# Patient Record
Sex: MALE | Race: WHITE | NOT HISPANIC OR LATINO | Employment: OTHER
[De-identification: names, ages, dates, MRNs, and addresses within clinical notes are randomized per-mention and may not be internally consistent; named-entity substitution may affect disease eponyms.]

---

## 2018-03-17 ENCOUNTER — HEALTH MAINTENANCE LETTER (OUTPATIENT)
Age: 78
End: 2018-03-17

## 2020-02-08 ENCOUNTER — HEALTH MAINTENANCE LETTER (OUTPATIENT)
Age: 80
End: 2020-02-08

## 2020-11-08 ENCOUNTER — HEALTH MAINTENANCE LETTER (OUTPATIENT)
Age: 80
End: 2020-11-08

## 2021-03-27 ENCOUNTER — HEALTH MAINTENANCE LETTER (OUTPATIENT)
Age: 81
End: 2021-03-27

## 2021-09-11 ENCOUNTER — HEALTH MAINTENANCE LETTER (OUTPATIENT)
Age: 81
End: 2021-09-11

## 2022-02-22 ENCOUNTER — HOSPITAL ENCOUNTER (EMERGENCY)
Facility: CLINIC | Age: 82
Discharge: HOME | End: 2022-02-22
Attending: EMERGENCY MEDICINE | Admitting: EMERGENCY MEDICINE
Payer: COMMERCIAL

## 2022-02-22 VITALS
HEART RATE: 60 BPM | TEMPERATURE: 98.1 F | RESPIRATION RATE: 16 BRPM | HEIGHT: 66 IN | WEIGHT: 190 LBS | OXYGEN SATURATION: 96 % | BODY MASS INDEX: 30.53 KG/M2 | DIASTOLIC BLOOD PRESSURE: 63 MMHG | SYSTOLIC BLOOD PRESSURE: 109 MMHG

## 2022-02-22 DIAGNOSIS — I47.10 SVT (SUPRAVENTRICULAR TACHYCARDIA) (H): ICD-10-CM

## 2022-02-22 PROBLEM — G40.109: Status: ACTIVE | Noted: 2019-02-19

## 2022-02-22 PROBLEM — G20.C PRIMARY PARKINSONISM (H): Status: ACTIVE | Noted: 2018-12-12

## 2022-02-22 PROBLEM — R53.1 WEAKNESS: Status: ACTIVE | Noted: 2022-01-22

## 2022-02-22 PROBLEM — I49.8 OTHER SPECIFIED CARDIAC DYSRHYTHMIAS(427.89): Status: ACTIVE | Noted: 2018-04-24

## 2022-02-22 LAB
ANION GAP SERPL CALCULATED.3IONS-SCNC: 9 MMOL/L (ref 5–18)
ATRIAL RATE - MUSE: 40 BPM
ATRIAL RATE - MUSE: 55 BPM
BUN SERPL-MCNC: 16 MG/DL (ref 8–28)
CALCIUM SERPL-MCNC: 9 MG/DL (ref 8.5–10.5)
CHLORIDE BLD-SCNC: 104 MMOL/L (ref 98–107)
CO2 SERPL-SCNC: 26 MMOL/L (ref 22–31)
CREAT SERPL-MCNC: 1.14 MG/DL (ref 0.7–1.3)
DIASTOLIC BLOOD PRESSURE - MUSE: 75 MMHG
DIASTOLIC BLOOD PRESSURE - MUSE: 76 MMHG
ERYTHROCYTE [DISTWIDTH] IN BLOOD BY AUTOMATED COUNT: 12.9 % (ref 10–15)
GFR SERPL CREATININE-BSD FRML MDRD: 65 ML/MIN/1.73M2
GLUCOSE BLD-MCNC: 113 MG/DL (ref 70–125)
HCT VFR BLD AUTO: 41.6 % (ref 40–53)
HGB BLD-MCNC: 13.6 G/DL (ref 13.3–17.7)
INTERPRETATION ECG - MUSE: NORMAL
INTERPRETATION ECG - MUSE: NORMAL
MAGNESIUM SERPL-MCNC: 2.1 MG/DL (ref 1.8–2.6)
MCH RBC QN AUTO: 31.3 PG (ref 26.5–33)
MCHC RBC AUTO-ENTMCNC: 32.7 G/DL (ref 31.5–36.5)
MCV RBC AUTO: 96 FL (ref 78–100)
P AXIS - MUSE: NORMAL DEGREES
P AXIS - MUSE: NORMAL DEGREES
PLATELET # BLD AUTO: 171 10E3/UL (ref 150–450)
POTASSIUM BLD-SCNC: 4.6 MMOL/L (ref 3.5–5)
PR INTERVAL - MUSE: NORMAL MS
PR INTERVAL - MUSE: NORMAL MS
QRS DURATION - MUSE: 108 MS
QRS DURATION - MUSE: 108 MS
QT - MUSE: 302 MS
QT - MUSE: 318 MS
QTC - MUSE: 451 MS
QTC - MUSE: 453 MS
R AXIS - MUSE: -35 DEGREES
R AXIS - MUSE: -69 DEGREES
RBC # BLD AUTO: 4.35 10E6/UL (ref 4.4–5.9)
SODIUM SERPL-SCNC: 139 MMOL/L (ref 136–145)
SYSTOLIC BLOOD PRESSURE - MUSE: 102 MMHG
SYSTOLIC BLOOD PRESSURE - MUSE: 114 MMHG
T AXIS - MUSE: 115 DEGREES
T AXIS - MUSE: 89 DEGREES
TSH SERPL DL<=0.005 MIU/L-ACNC: 5.26 UIU/ML (ref 0.3–5)
VENTRICULAR RATE- MUSE: 121 BPM
VENTRICULAR RATE- MUSE: 135 BPM
WBC # BLD AUTO: 9.2 10E3/UL (ref 4–11)

## 2022-02-22 PROCEDURE — 250N000011 HC RX IP 250 OP 636: Performed by: EMERGENCY MEDICINE

## 2022-02-22 PROCEDURE — 83735 ASSAY OF MAGNESIUM: CPT | Performed by: EMERGENCY MEDICINE

## 2022-02-22 PROCEDURE — 85027 COMPLETE CBC AUTOMATED: CPT | Performed by: EMERGENCY MEDICINE

## 2022-02-22 PROCEDURE — 84439 ASSAY OF FREE THYROXINE: CPT | Performed by: EMERGENCY MEDICINE

## 2022-02-22 PROCEDURE — 99285 EMERGENCY DEPT VISIT HI MDM: CPT | Mod: 25

## 2022-02-22 PROCEDURE — 96374 THER/PROPH/DIAG INJ IV PUSH: CPT

## 2022-02-22 PROCEDURE — 84443 ASSAY THYROID STIM HORMONE: CPT | Performed by: EMERGENCY MEDICINE

## 2022-02-22 PROCEDURE — 250N000013 HC RX MED GY IP 250 OP 250 PS 637: Performed by: EMERGENCY MEDICINE

## 2022-02-22 PROCEDURE — 93005 ELECTROCARDIOGRAM TRACING: CPT | Performed by: EMERGENCY MEDICINE

## 2022-02-22 PROCEDURE — 36415 COLL VENOUS BLD VENIPUNCTURE: CPT | Performed by: EMERGENCY MEDICINE

## 2022-02-22 PROCEDURE — 80048 BASIC METABOLIC PNL TOTAL CA: CPT | Performed by: EMERGENCY MEDICINE

## 2022-02-22 RX ORDER — MULTIVITAMIN,THERAPEUTIC
1 TABLET ORAL DAILY
COMMUNITY

## 2022-02-22 RX ORDER — DONEPEZIL HYDROCHLORIDE 5 MG/1
5 TABLET, FILM COATED ORAL EVERY MORNING
COMMUNITY
End: 2024-05-08

## 2022-02-22 RX ORDER — ASPIRIN 81 MG/1
81 TABLET ORAL DAILY
COMMUNITY

## 2022-02-22 RX ORDER — ATENOLOL 50 MG/1
50 TABLET ORAL DAILY
Status: ON HOLD | COMMUNITY
End: 2024-05-13

## 2022-02-22 RX ORDER — TROSPIUM CHLORIDE ER 60 MG/1
60 CAPSULE ORAL EVERY MORNING
COMMUNITY
End: 2024-05-08

## 2022-02-22 RX ORDER — ADENOSINE 3 MG/ML
6 INJECTION, SOLUTION INTRAVENOUS ONCE
Status: COMPLETED | OUTPATIENT
Start: 2022-02-22 | End: 2022-02-22

## 2022-02-22 RX ORDER — CARBIDOPA AND LEVODOPA 25; 100 MG/1; MG/1
2 TABLET ORAL 3 TIMES DAILY
COMMUNITY

## 2022-02-22 RX ORDER — TRAZODONE HYDROCHLORIDE 50 MG/1
50 TABLET, FILM COATED ORAL AT BEDTIME
COMMUNITY

## 2022-02-22 RX ORDER — ATENOLOL 25 MG/1
50 TABLET ORAL ONCE
Status: COMPLETED | OUTPATIENT
Start: 2022-02-22 | End: 2022-02-22

## 2022-02-22 RX ORDER — CHLORAL HYDRATE 500 MG
1 CAPSULE ORAL DAILY
COMMUNITY
End: 2024-05-08

## 2022-02-22 RX ADMIN — ATENOLOL 50 MG: 25 TABLET ORAL at 19:05

## 2022-02-22 RX ADMIN — ADENOSINE 6 MG: 3 INJECTION INTRAVENOUS at 21:37

## 2022-02-23 LAB — T4 FREE SERPL-MCNC: 0.74 NG/DL (ref 0.7–1.8)

## 2022-02-23 NOTE — ED PROVIDER NOTES
Emergency Department Encounter     Evaluation Date & Time:   2/22/2022  6:05 PM    CHIEF COMPLAINT:  Tachycardia      Triage Note:Pt from a [facility] presents by EMS with concerns for elevated heart rate today.  Recent decreased dose of Tenormin last week.  Pt denies chest pain.  Pt does C/O some exertional dyspnea.        Impression and Plan     ED COURSE & MEDICAL DECISION MAKING:    Patient EKG on admission to the ED showed SVT.  Due to recent decrease of atenolol, patient was given original dose of 50 mg atenolol.  Slow down heart rate slightly but not to desired degree.  Repeat 8 EKG showed continued SVT but at slower rate.  Patient was then given 6 mg of adenosine and heart rate returned to baseline of mid 50s bpm.  Blood pressure stayed steady in 110s/60s.  Patient sent home on old dose of 50 mg atenolol and advised follow-up with cardiology for medical management within 1 week.    At the conclusion of the encounter I discussed the results of all the tests and the disposition. The questions were answered. The patient or family acknowledged understanding and was agreeable with the care plan.    FINAL IMPRESSION:    ICD-10-CM    1. SVT (supraventricular tachycardia) (H)  I47.1 Rapid Access Clinic Referral       0 minutes of critical care time    Reassessments & Consults  ED Course as of 02/22/22 2146 Tue Feb 22, 2022   1900 Patient is an 81-year-old gentleman with history of Parkinson's disease, nonspecific tachycardia, here with elevated heart rate and lower than normal blood pressure.  Normally symptoms going down but they have been persistent since this morning.  He recently cut his dose of atenolol in half at home due to lower blood pressures.  He is comfortable on exam, heart rate in the 130s.  EKG shows narrow complex regular tachycardia consistent with SVT.  Modified vagal maneuvers were attempted without success.  He has no chest pain currently.  He was given a full dose of atenolol which  "is worked for him in the past   1900 Initial EKG shows SVT with rate of 135.  Left axis deviation, LVH.  No acute ischemic ST or T wave morphology.  No prior EKG for comparison.  Normal QTC, QRS intervals.   2002 Heart rate is 124 currently.  He will be a peak concentration in about an hour.   2136 Repeat EKG shows started junctional rhythm at a rate of 121.  Left axis deviation.  LVH.  Normal QTC and QRS intervals.  When compared to prior EKG earlier this afternoon the junctional rhythm is unchanged, rate has decreased from 1 35-1 21.  Impression: Accelerated junctional rhythm versus SVT with a rate of 121.       MEDICATIONS GIVEN IN THE EMERGENCY DEPARTMENT:  Medications   atenolol (TENORMIN) tablet 50 mg (50 mg Oral Given 2/22/22 1905)   adenosine (ADENOCARD) injection 6 mg (6 mg Intravenous Given 2/22/22 2137)       NEW PRESCRIPTIONS STARTED AT TODAY'S ED VISIT:  New Prescriptions    No medications on file       HPI     HPI     Al Lynn is a 81 year old male with a pertinent history of HTN, episodic SVT, and Parkinson's disease who presents to this ED via EMS for evaluation of tachycardia.     Patient reported feeling \"funny\" and checked pulse at 7am.  Found to be fast.  Provider at nursing home noted that blood pressure was below his normal (110s/60s) and that heart rate was in 130's.  Patient reported some chest pain with exertion and some shortness of breath with activity, but none at rest.  Patient was then taken to ED via EMS.    Per patient, he has had many episodes of this episodic tachycardia.  Per him, most episodes resolved within about 5 to 10 minutes.  Longest episode lasted about 1.5 days.  Patient reported that he recently reduced his dose of atenolol from 50 to 25 mg due to excessively slowed heart rate per report from daughter.    Of note, patient had an episode last night in which left arm went completely numb and he was unable to move it at all.  This episode lasted for 5 minutes and " then resolved completely.  Patient has never had an episode like this before per both patient report and daughter.    REVIEW OF SYSTEMS:  Review of Systems  Positive for chest pain with activity, shortness of breath with activity, loose stool 2 days ago, and resting tremor.    Negative for headache, double vision, chest pain at rest, shortness of breath at rest, unilateral numbness, unilateral weakness, leg swelling, gait imbalance.      Medical History     Past Medical History:   Diagnosis Date     Colon polyp      Cough      Depression      Diverticulitis      Head injury      Hearing loss      Hemorrhoids      HTN (hypertension)      Sinus drainage        Past Surgical History:   Procedure Laterality Date     ESOPHAGOSCOPY, GASTROSCOPY, DUODENOSCOPY (EGD), COMBINED  4/21/2014    Procedure: Gastroscopy ;  Surgeon: Bradley Colmenares MD;  Location: [facility]     HERNIA REPAIR, INGUINAL RT/LT       ZZC APPENDECTOMY         Family History   Problem Relation Age of Onset     C.A.D. Father      Cancer Brother      Neurologic Disorder Brother         parkinsons       Social History     Tobacco Use     Smoking status: Former Smoker     Smokeless tobacco: Never Used   Substance Use Topics     Alcohol use: Yes     Comment: every day     Drug use: No       aspirin 81 MG EC tablet  atenolol (TENORMIN) 50 MG tablet  carbidopa-levodopa (SINEMET)  MG tablet  diphenhydrAMINE (BENADRYL) 50 MG capsule  donepezil (ARICEPT) 5 MG tablet  fish oil-omega-3 fatty acids 1000 MG capsule  LANsoprazole (PREVACID) 30 MG capsule  multivitamin, therapeutic (THERA-VIT) TABS tablet  traZODone (DESYREL) 50 MG tablet  trospium (SANCTURA XR) 60 MG CP24 24 hr capsule  Vitamin D, Cholecalciferol, 25 MCG (1000 UT) TABS  vitamin E (TOCOPHEROL) 200 units (90 mg) capsule        Physical Exam     First Vitals:  Patient Vitals for the past 24 hrs:   BP Temp Temp src Pulse Resp SpO2 Height Weight   02/22/22 2102 114/66 -- -- (!) 121 19 96 % -- --   02/22/22  "1915 113/67 -- -- (!) 132 (!) 31 95 % -- --   02/22/22 1900 99/64 -- -- (!) 132 22 95 % -- --   02/22/22 1845 95/67 -- -- (!) 131 23 96 % -- --   02/22/22 1830 95/66 -- -- (!) 133 17 98 % -- --   02/22/22 1812 102/76 98.1  F (36.7  C) Oral (!) 134 18 98 % 1.676 m (5' 6\") 86.2 kg (190 lb)       PHYSICAL EXAM:   Physical Exam  General: Elderly gentleman who appears stated age, alert and engaged in conversation   Cardiovascular: I/VI systolic flow murmur at aortic listening area, tachycardic, regular rhythm, +3 pulses bilateral upper extremities and lower extremities  Pulmonary: Clear to auscultation, no wheezes, rales, rhonchi  Extremities: No edema, no tenderness to palpation of calves  Neuro: 4/4 strength upper extremities bilaterally, 4/4 strength lower extremities bilaterally    Results     LAB:  All pertinent labs reviewed and interpreted  Labs Ordered and Resulted from Time of ED Arrival to Time of ED Departure   CBC WITH PLATELETS - Abnormal       Result Value    WBC Count 9.2      RBC Count 4.35 (*)     Hemoglobin 13.6      Hematocrit 41.6      MCV 96      MCH 31.3      MCHC 32.7      RDW 12.9      Platelet Count 171     TSH WITH FREE T4 REFLEX - Abnormal    TSH 5.26 (*)    BASIC METABOLIC PANEL - Normal    Sodium 139      Potassium 4.6      Chloride 104      Carbon Dioxide (CO2) 26      Anion Gap 9      Urea Nitrogen 16      Creatinine 1.14      Calcium 9.0      Glucose 113      GFR Estimate 65     MAGNESIUM - Normal    Magnesium 2.1     T4 FREE       RADIOLOGY:  No orders to display              ECG:  SVT  Performed at: 1808    Impression: SVT    Rate: 135  Rhythm: SVT  Axis: normal  AK Interval: unable to determine due to lack of P waves  QRS Interval: 108  QTc Interval: 453  ST Changes: none  Comparison: none    SVT  Performed at: 2056    Impression: SVT, slightly slower rate than before    Rate: 121  Rhythm: SVT  Axis: normal  AK Interval: unable to determine due to lack of p waves  QRS Interval: 108  QTc " Interval: 451  ST Changes: none  Comparison: 2/22/22 1808    I have independently reviewed and interpreted the EKS(s) documented above    PROCEDURES:  Procedures:  none    [facility] Documentation               CMS Diagnoses:           Alysa Gibson MD  Emergency Medicine  [facility] EMERGENCY ROOM       Alysa Gibson MD  Resident  02/22/22 8644

## 2022-02-23 NOTE — DISCHARGE INSTRUCTIONS
Your heart was in a rhythm called SVT.  This is likely the rapid rate you have felt off and on over the years.  Because you decreased her atenolol and because it lasted all day today, I think it is probably safer to go back onto your normal dose of atenolol, 50 mg, for a short period of time.  I placed an urgent referral for cardiology, somebody should call tomorrow and help set up an appointment.  They may be able to see you by the end of the week.  They can make further recommendations on medical management.

## 2022-02-23 NOTE — ED TRIAGE NOTES
Pt from a [facility] presents by EMS with concerns for elevated heart rate today.  Recent decreased dose of Tenormin last week.  Pt denies chest pain.  Pt does C/O some exertional dyspnea.

## 2022-02-23 NOTE — ED PROVIDER NOTES
Emergency Department Midlevel Supervisory Note     I personally saw the patient and performed a substantive portion of the visit including all aspects of the medical decision making.    ED Course:  6:40 PM Dr. Alysa Gibson, resident physician, staffed patient with me. I agree with their assessment and plan of management, and I will see the patient.  6:50 PM I met with the patient to introduce myself, gather additional history, perform my initial exam, and discuss the plan.     Brief HPI:     Al Lynn is a 81 year old male who presents for evaluation of tachycardia.    Patient has history of paroxysmal tachycardia, no diagnosis for the specific type, but takes atenolol.  Dose was dropped this past week and today is felt this tachycardic sensation since he woke up, much longer than it usually lasts.  No current chest pain or shortness of breath.    I, Oxana Gao, am serving as a scribe to document services personally performed by Dr. Choi, based on my observations and the provider's statements to me.   I, Selvin Choi MD, attest that Oxana Gao was acting in a scribe capacity, has observed my performance of the services and has documented them in accordance with my direction.    Brief Physical Exam:  Constitutional:  Alert, in no acute distress  EYES: Conjunctivae clear  HENT:  Atraumatic, normocephalic  Respiratory:  Respirations even, unlabored, in no acute respiratory distress  Cardiovascular:  Regular rate and rhythm, good peripheral perfusion  GI: Soft, nondistended, nontender, no palpable masses, no rebound, no guarding   Musculoskeletal:  No edema. No cyanosis. Range of motion major extremities intact.    Integument: Warm, Dry, No erythema, No rash.   Neurologic:  Alert & oriented, no focal deficits noted  Psych: Normal mood and affect     MDM:    ED Course as of 02/22/22 2149 Tue Feb 22, 2022 1900 Patient is an 81-year-old gentleman with history of Parkinson's disease,  nonspecific tachycardia, here with elevated heart rate and lower than normal blood pressure.  Normally symptoms going down but they have been persistent since this morning.  He recently cut his dose of atenolol in half at home due to lower blood pressures.  He is comfortable on exam, heart rate in the 130s.  EKG shows narrow complex regular tachycardia consistent with SVT.  Modified vagal maneuvers were attempted without success.  He has no chest pain currently.  He was given a full dose of atenolol which is worked for him in the past   1900 Initial EKG shows SVT with rate of 135.  Left axis deviation, LVH.  No acute ischemic ST or T wave morphology.  No prior EKG for comparison.  Normal QTC, QRS intervals.   2002 Heart rate is 124 currently.  He will be a peak concentration in about an hour.   2136 Repeat EKG shows started junctional rhythm at a rate of 121.  Left axis deviation.  LVH.  Normal QTC and QRS intervals.  When compared to prior EKG earlier this afternoon the junctional rhythm is unchanged, rate has decreased from 1 35-1 21.  Impression: Accelerated junctional rhythm versus SVT with a rate of 121.     Patient was successfully cardioverted with adenosine.  6 mg was used.  Repeat EKG was ordered.                     1. SVT (supraventricular tachycardia) (H)        Labs and Imaging:  Results for orders placed or performed during the hospital encounter of 02/22/22   ECG 12-LEAD WITH MUSE (LHE)   Result Value Ref Range    Systolic Blood Pressure 102 mmHg    Diastolic Blood Pressure 76 mmHg    Ventricular Rate 135 BPM    Atrial Rate 55 BPM    NE Interval  ms    QRS Duration 108 ms     ms    QTc 453 ms    P Axis  degrees    R AXIS -69 degrees    T Axis 89 degrees    Interpretation ECG       Supraventricular tachycardia  Left anterior fascicular block  Left ventricular hypertrophy with repolarization abnormality  Abnormal ECG  No previous ECGs available  Confirmed by SEE ED PROVIDER NOTE FOR, ECG  INTERPRETATION ([id number]),  INDRA HERNANDEZ ([id number]) on 2/22/2022 6:30:32 PM       I have reviewed the relevant laboratory and radiology studies    Procedures:  I was present for the key portions of this procedure:   PROCEDURE: Chemical Cardioversion    INDICATIONS: SVT   CARDIOVERSION TYPE: Chemical, Adenosine   CARDIOVERSION PROVIDER: Dr Dhruv Choi   SEDATION: None   CONSENT: Risks, benefits and alternatives were discussed with and Written consent was obtained from Patient.   PROCEDURE SPECIFIC CHECKLIST COMPLETED: Yes   TIME OUT: Universal protocol was followed. TIME OUT conducted just prior to starting procedure confirmed patient identity, site/side, procedure, patient position, and availability of correct equipment. Yes.   MEDICATIONS GIVEN: 6 mg Adenosine, IV push, followed by rapid flush     MONITORING: Heart rate, cardiac monitor, continuous pulse oximeter, frequent blood pressure checks, level of consciousness checks, IV access, constant attendance by RN until patient is recovered and aconstant attendance by MD until patient is stable   RESPONSE: Vital signs stable, airway patent and O2 saturations remained >92%     Brief pause, then return to bradycardic rhythm.  He tolerated the procedure very well.   COMPLICATIONS: Patient tolerated procedure well, without complication         Selvin Choi MD  [facility] EMERGENCY ROOM  [facility] [address] [phone]       Selvin Choi MD  02/22/22 3774

## 2022-02-25 ENCOUNTER — DOCUMENTATION ONLY (OUTPATIENT)
Dept: OTHER | Facility: CLINIC | Age: 82
End: 2022-02-25
Payer: COMMERCIAL

## 2022-03-03 ENCOUNTER — OFFICE VISIT (OUTPATIENT)
Dept: CARDIOLOGY | Facility: CLINIC | Age: 82
End: 2022-03-03
Attending: EMERGENCY MEDICINE
Payer: COMMERCIAL

## 2022-03-03 VITALS
RESPIRATION RATE: 16 BRPM | HEART RATE: 64 BPM | DIASTOLIC BLOOD PRESSURE: 58 MMHG | WEIGHT: 189 LBS | HEIGHT: 63 IN | SYSTOLIC BLOOD PRESSURE: 104 MMHG | BODY MASS INDEX: 33.49 KG/M2

## 2022-03-03 DIAGNOSIS — R55 SYNCOPE, UNSPECIFIED SYNCOPE TYPE: ICD-10-CM

## 2022-03-03 DIAGNOSIS — G20.C PRIMARY PARKINSONISM (H): ICD-10-CM

## 2022-03-03 DIAGNOSIS — I47.10 PAROXYSMAL SUPRAVENTRICULAR TACHYCARDIA (H): Primary | ICD-10-CM

## 2022-03-03 PROCEDURE — 99204 OFFICE O/P NEW MOD 45 MIN: CPT | Performed by: INTERNAL MEDICINE

## 2022-03-03 RX ORDER — OXYBUTYNIN CHLORIDE 10 MG/1
10 TABLET, EXTENDED RELEASE ORAL DAILY
COMMUNITY
End: 2024-05-08

## 2022-03-03 NOTE — PATIENT INSTRUCTIONS
1. Continue current medications  2. Schedule holter monitor and consultation with EP regarding possible ablation therapy

## 2022-03-03 NOTE — LETTER
3/3/2022    Joann Guzman  [address]  [place] [facility] [address]    RE: Al Lynn       Dear Colleague,     I had the pleasure of seeing Al Lynn in the [facility].      Thank you, Dr. Selvin Choi, for asking the [facility] team to see Mr. Al Lynn in the rapid access clinic to evaluate paroxysmal SVT.    Assessment/Recommendations   Assessment:    1.  Paroxysmal SVT, converted with 6 mg IV adenosine back to sinus rhythm.  He had been on atenolol 50 mg daily for many years now both for treatment of hypertension as well as SVT.  This dose had recently been reduced to 25 mg daily due to bradycardia.  Following his SVT episode, dose has been increased back to 50 mg daily.  We will continue this dose for now but did suggest a Holter monitor to evaluate for profound bradycardia.  Also recommended referral to EP regarding feasibility of an ablation procedure to eradicate his SVT.    2. Possible syncope.  Patient reportedly had a fall in the garage of the living facility he resides in.  Does not recall feeling lightheaded prior to the event or sense of rapid heart beating.  Remembers being on the ground but unable to move his arms and legs but could raise his head.  Ultimately found but did not undergo immediate medical evaluation.  3.  Bradycardia with heart rates documented into the 40s at his assisted living facility.  For this reason he was recently advised to cut his atenolol to 25 mg daily.  Again this was recently increased following his episode of SVT.  4.  Parkinson's disease    Plan:  1.  Continue atenolol at 50 mg daily  2.  Holter monitor  3.  Referral to EP for possible ablation       History of Present Illness    Mr. Al Lynn is a 81 year old male with history of tachycardia dating back 30 years now for which he has been on atenolol who recently was seen in the ED following an episode of more persistent tachycardia.   "Patient states that over the years, his episodes have lasted 5 to 10 minutes and have resolved.  He has not had any prolonged episodes.  About a week before his event, his dose of atenolol was decreased due to concerns that his heart rates were down into the 40s.  He was having no symptoms at that time; however, he did have a possible syncopal episode 2 months prior and there was concern it might have been related to a low heart rate.  On the date of his presentation, he is noted sense of rapid heart beating which persisted throughout the day.  He was ultimately brought to the ED for evaluation where he was noted to be in a narrow complex tachycardia at a rate of 135.  Initial attempts at vagal maneuvers were unsuccessful.  He was given 6 mg of IV adenosine with spontaneous conversion to sinus rhythm.  He was subsequently discharged back on 50 mg of atenolol daily.    ECG (personally reviewed): ECGs x2 again demonstrate narrow complex tachycardia    Cardiac Imaging Studies (personally reviewed): Echocardiogram done in October 2021 demonstrated normal left ventricular systolic function with an ejection fraction of 61%, trace aortic, mitral and tricuspid insufficiency.  Pulmonary artery pressures at upper limits of normal.       Physical Examination Review of Systems   /58 (BP Location: Right arm, Patient Position: Sitting, Cuff Size: Adult Regular)   Pulse 64   Resp 16   Ht 1.6 m (5' 3\")   Wt 85.7 kg (189 lb)   BMI 33.48 kg/m    Body mass index is 33.48 kg/m .  Wt Readings from Last 3 Encounters:   03/03/22 85.7 kg (189 lb)   02/22/22 86.2 kg (190 lb)   04/21/14 92.1 kg (203 lb)     General Appearance:   Awake, Alert, No acute distress.   HEENT:  No scleral icterus; the mucous membranes were pink and moist.   Neck: No cervical bruits or jugular venous distention    Chest: The spine was straight. The chest was symmetric.   Lungs:   Respirations unlabored; the lungs are clear to auscultation. No wheezing " "  Cardiovascular:    Regular rate and rhythm.  S1, S2 normal.  1/6 systolic murmur noted   Abdomen:  No organomegaly, masses, bruits, or tenderness. Bowels sounds are present   Extremities:  No peripheral edema, clubbing or cyanosis   Skin: No xanthelasma. Warm, Dry.   Musculoskeletal: No tenderness.   Neurologic: Mood and affect are appropriate.    Enc Vitals  BP: 104/58  Pulse: 64  Resp: 16  Weight: 85.7 kg (189 lb)  Height: 160 cm (5' 3\")                                         Medical History  Surgical History Family History Social History   Past Medical History:   Diagnosis Date     Colon polyp      Cough      Depression      Diverticulitis      Head injury     fell working in garden     Hearing loss      Hemorrhoids      HTN (hypertension)      Sinus drainage     coughing spells?    Past Surgical History:   Procedure Laterality Date     ESOPHAGOSCOPY, GASTROSCOPY, DUODENOSCOPY (EGD), COMBINED  4/21/2014    Procedure: Gastroscopy ;  Surgeon: Bradley Colmenares MD;  Location: [facility]     HERNIA REPAIR, INGUINAL RT/LT       ZZC APPENDECTOMY      Family History   Problem Relation Age of Onset     C.A.D. Father      Cancer Brother      Neurologic Disorder Brother         parkinsons    Social History     Socioeconomic History     Marital status:      Spouse name: Not on file     Number of children: Not on file     Years of education: Not on file     Highest education level: Not on file   Occupational History     Not on file   Tobacco Use     Smoking status: Former Smoker     Smokeless tobacco: Never Used   Substance and Sexual Activity     Alcohol use: Yes     Comment: every day     Drug use: No     Sexual activity: Not on file   Other Topics Concern     Parent/sibling w/ CABG, MI or angioplasty before 65F 55M? Not Asked   Social History Narrative     Not on file     Social Determinants of Health     Financial Resource Strain: Not on file   Food Insecurity: Not on file   Transportation Needs: Not on file   Physical " Activity: Not on file   Stress: Not on file   Social Connections: Not on file   Intimate Partner Violence: Not on file   Housing Stability: Not on file          Medications  Allergies   Current Outpatient Medications   Medication Sig Dispense Refill     aspirin 81 MG EC tablet Take 81 mg by mouth daily       atenolol (TENORMIN) 50 MG tablet Take 25 mg by mouth daily Take 1/2 tablet (25 mg) daily.       carbidopa-levodopa (SINEMET)  MG tablet Take 2 tablets by mouth 3 times daily 7 am, 1 pm, and 7pm.       multivitamin, therapeutic (THERA-VIT) TABS tablet Take 1 tablet by mouth daily       oxybutynin ER (DITROPAN-XL) 10 MG 24 hr tablet Take 10 mg by mouth daily       vitamin E (TOCOPHEROL) 200 units (90 mg) capsule Take 200 Units by mouth daily       diphenhydrAMINE (BENADRYL) 50 MG capsule Take 1 capsule by mouth At Bedtime. (Patient not taking: Reported on 3/3/2022) 60 capsule 2     donepezil (ARICEPT) 5 MG tablet Take 5 mg by mouth every morning (Patient not taking: Reported on 3/3/2022)       fish oil-omega-3 fatty acids 1000 MG capsule Take 1 g by mouth daily (Patient not taking: Reported on 3/3/2022)       LANsoprazole (PREVACID) 30 MG capsule Take 1 capsule (30 mg) by mouth daily Take 30-60 minutes before a meal. (Patient not taking: Reported on 3/3/2022) 60 capsule 1     traZODone (DESYREL) 50 MG tablet Take 50 mg by mouth At Bedtime (Patient not taking: Reported on 3/3/2022)       trospium (SANCTURA XR) 60 MG CP24 24 hr capsule Take 60 mg by mouth every morning (Patient not taking: Reported on 3/3/2022)       Vitamin D, Cholecalciferol, 25 MCG (1000 UT) TABS Take 2,000 Units by mouth daily (Patient not taking: Reported on 3/3/2022)        Allergies   Allergen Reactions     Nka [No Known Allergies]          Lab Results    Chemistry/lipid CBC Cardiac Enzymes/BNP/TSH/INR   Recent Labs   Lab Test 02/22/22 1931   BUN 16      CO2 26    Recent Labs   Lab Test 02/22/22 1931   WBC 9.2   HGB 13.6   HCT  41.6   MCV 96       Recent Labs   Lab Test 02/22/22  1931   TSH 5.26*        A total of 62 minutes was spent reviewing patient's medical records, obtaining history and performing examination, as well as discussing diagnoses/ recommendations with patient and answering all questions.                  Thank you for allowing me to participate in the care of your patient.      Sincerely,     Lorelei Main MD     [facility]  cc:   Selvin Choi MD  [facility]  [facility] [phone]

## 2022-03-03 NOTE — PROGRESS NOTES
Thank you, Dr. Selvin Choi, for asking the [facility] team to see Mr. Al Lynn in the rapid access clinic to evaluate paroxysmal SVT.    Assessment/Recommendations   Assessment:    1.  Paroxysmal SVT, converted with 6 mg IV adenosine back to sinus rhythm.  He had been on atenolol 50 mg daily for many years now both for treatment of hypertension as well as SVT.  This dose had recently been reduced to 25 mg daily due to bradycardia.  Following his SVT episode, dose has been increased back to 50 mg daily.  We will continue this dose for now but did suggest a Holter monitor to evaluate for profound bradycardia.  Also recommended referral to EP regarding feasibility of an ablation procedure to eradicate his SVT.    2. Possible syncope.  Patient reportedly had a fall in the garage of the living facility he resides in.  Does not recall feeling lightheaded prior to the event or sense of rapid heart beating.  Remembers being on the ground but unable to move his arms and legs but could raise his head.  Ultimately found but did not undergo immediate medical evaluation.  3.  Bradycardia with heart rates documented into the 40s at his assisted living facility.  For this reason he was recently advised to cut his atenolol to 25 mg daily.  Again this was recently increased following his episode of SVT.  4.  Parkinson's disease    Plan:  1.  Continue atenolol at 50 mg daily  2.  Holter monitor  3.  Referral to EP for possible ablation       History of Present Illness    Mr. Al Lynn is a 81 year old male with history of tachycardia dating back 30 years now for which he has been on atenolol who recently was seen in the ED following an episode of more persistent tachycardia.  Patient states that over the years, his episodes have lasted 5 to 10 minutes and have resolved.  He has not had any prolonged episodes.  About a week before his event, his dose of atenolol was decreased due to  "concerns that his heart rates were down into the 40s.  He was having no symptoms at that time; however, he did have a possible syncopal episode 2 months prior and there was concern it might have been related to a low heart rate.  On the date of his presentation, he is noted sense of rapid heart beating which persisted throughout the day.  He was ultimately brought to the ED for evaluation where he was noted to be in a narrow complex tachycardia at a rate of 135.  Initial attempts at vagal maneuvers were unsuccessful.  He was given 6 mg of IV adenosine with spontaneous conversion to sinus rhythm.  He was subsequently discharged back on 50 mg of atenolol daily.    ECG (personally reviewed): ECGs x2 again demonstrate narrow complex tachycardia    Cardiac Imaging Studies (personally reviewed): Echocardiogram done in October 2021 demonstrated normal left ventricular systolic function with an ejection fraction of 61%, trace aortic, mitral and tricuspid insufficiency.  Pulmonary artery pressures at upper limits of normal.       Physical Examination Review of Systems   /58 (BP Location: Right arm, Patient Position: Sitting, Cuff Size: Adult Regular)   Pulse 64   Resp 16   Ht 1.6 m (5' 3\")   Wt 85.7 kg (189 lb)   BMI 33.48 kg/m    Body mass index is 33.48 kg/m .  Wt Readings from Last 3 Encounters:   03/03/22 85.7 kg (189 lb)   02/22/22 86.2 kg (190 lb)   04/21/14 92.1 kg (203 lb)     General Appearance:   Awake, Alert, No acute distress.   HEENT:  No scleral icterus; the mucous membranes were pink and moist.   Neck: No cervical bruits or jugular venous distention    Chest: The spine was straight. The chest was symmetric.   Lungs:   Respirations unlabored; the lungs are clear to auscultation. No wheezing   Cardiovascular:    Regular rate and rhythm.  S1, S2 normal.  1/6 systolic murmur noted   Abdomen:  No organomegaly, masses, bruits, or tenderness. Bowels sounds are present   Extremities:  No peripheral edema, " "clubbing or cyanosis   Skin: No xanthelasma. Warm, Dry.   Musculoskeletal: No tenderness.   Neurologic: Mood and affect are appropriate.    Enc Vitals  BP: 104/58  Pulse: 64  Resp: 16  Weight: 85.7 kg (189 lb)  Height: 160 cm (5' 3\")                                         Medical History  Surgical History Family History Social History   Past Medical History:   Diagnosis Date     Colon polyp      Cough      Depression      Diverticulitis      Head injury     fell working in garden     Hearing loss      Hemorrhoids      HTN (hypertension)      Sinus drainage     coughing spells?    Past Surgical History:   Procedure Laterality Date     ESOPHAGOSCOPY, GASTROSCOPY, DUODENOSCOPY (EGD), COMBINED  4/21/2014    Procedure: Gastroscopy ;  Surgeon: Bradley Colmenares MD;  Location: [facility]     HERNIA REPAIR, INGUINAL RT/LT       ZZC APPENDECTOMY      Family History   Problem Relation Age of Onset     C.A.D. Father      Cancer Brother      Neurologic Disorder Brother         parkinsons    Social History     Socioeconomic History     Marital status:      Spouse name: Not on file     Number of children: Not on file     Years of education: Not on file     Highest education level: Not on file   Occupational History     Not on file   Tobacco Use     Smoking status: Former Smoker     Smokeless tobacco: Never Used   Substance and Sexual Activity     Alcohol use: Yes     Comment: every day     Drug use: No     Sexual activity: Not on file   Other Topics Concern     Parent/sibling w/ CABG, MI or angioplasty before 65F 55M? Not Asked   Social History Narrative     Not on file     Social Determinants of Health     Financial Resource Strain: Not on file   Food Insecurity: Not on file   Transportation Needs: Not on file   Physical Activity: Not on file   Stress: Not on file   Social Connections: Not on file   Intimate Partner Violence: Not on file   Housing Stability: Not on file          Medications  Allergies   Current Outpatient " Medications   Medication Sig Dispense Refill     aspirin 81 MG EC tablet Take 81 mg by mouth daily       atenolol (TENORMIN) 50 MG tablet Take 25 mg by mouth daily Take 1/2 tablet (25 mg) daily.       carbidopa-levodopa (SINEMET)  MG tablet Take 2 tablets by mouth 3 times daily 7 am, 1 pm, and 7pm.       multivitamin, therapeutic (THERA-VIT) TABS tablet Take 1 tablet by mouth daily       oxybutynin ER (DITROPAN-XL) 10 MG 24 hr tablet Take 10 mg by mouth daily       vitamin E (TOCOPHEROL) 200 units (90 mg) capsule Take 200 Units by mouth daily       diphenhydrAMINE (BENADRYL) 50 MG capsule Take 1 capsule by mouth At Bedtime. (Patient not taking: Reported on 3/3/2022) 60 capsule 2     donepezil (ARICEPT) 5 MG tablet Take 5 mg by mouth every morning (Patient not taking: Reported on 3/3/2022)       fish oil-omega-3 fatty acids 1000 MG capsule Take 1 g by mouth daily (Patient not taking: Reported on 3/3/2022)       LANsoprazole (PREVACID) 30 MG capsule Take 1 capsule (30 mg) by mouth daily Take 30-60 minutes before a meal. (Patient not taking: Reported on 3/3/2022) 60 capsule 1     traZODone (DESYREL) 50 MG tablet Take 50 mg by mouth At Bedtime (Patient not taking: Reported on 3/3/2022)       trospium (SANCTURA XR) 60 MG CP24 24 hr capsule Take 60 mg by mouth every morning (Patient not taking: Reported on 3/3/2022)       Vitamin D, Cholecalciferol, 25 MCG (1000 UT) TABS Take 2,000 Units by mouth daily (Patient not taking: Reported on 3/3/2022)        Allergies   Allergen Reactions     Nka [No Known Allergies]          Lab Results    Chemistry/lipid CBC Cardiac Enzymes/BNP/TSH/INR   Recent Labs   Lab Test 02/22/22 1931   BUN 16      CO2 26    Recent Labs   Lab Test 02/22/22 1931   WBC 9.2   HGB 13.6   HCT 41.6   MCV 96       Recent Labs   Lab Test 02/22/22 1931   TSH 5.26*        A total of 62 minutes was spent reviewing patient's medical records, obtaining history and performing examination, as well  as discussing diagnoses/ recommendations with patient and answering all questions.

## 2022-03-08 ENCOUNTER — HOSPITAL ENCOUNTER (OUTPATIENT)
Dept: CARDIOLOGY | Facility: CLINIC | Age: 82
Discharge: HOME | End: 2022-03-08
Attending: INTERNAL MEDICINE | Admitting: INTERNAL MEDICINE
Payer: COMMERCIAL

## 2022-03-08 DIAGNOSIS — I47.10 PAROXYSMAL SUPRAVENTRICULAR TACHYCARDIA (H): ICD-10-CM

## 2022-03-08 PROCEDURE — 93226 XTRNL ECG REC<48 HR SCAN A/R: CPT

## 2022-03-14 PROCEDURE — 93227 XTRNL ECG REC<48 HR R&I: CPT | Performed by: INTERNAL MEDICINE

## 2022-03-18 ENCOUNTER — LAB REQUISITION (OUTPATIENT)
Dept: LAB | Facility: CLINIC | Age: 82
End: 2022-03-18
Payer: COMMERCIAL

## 2022-03-18 DIAGNOSIS — R19.7 DIARRHEA, UNSPECIFIED: ICD-10-CM

## 2022-03-18 PROCEDURE — 87493 C DIFF AMPLIFIED PROBE: CPT | Mod: ORL | Performed by: NURSE PRACTITIONER

## 2022-03-19 LAB — C DIFF TOX B STL QL: NEGATIVE

## 2022-04-23 ENCOUNTER — HEALTH MAINTENANCE LETTER (OUTPATIENT)
Age: 82
End: 2022-04-23

## 2022-06-15 ENCOUNTER — APPOINTMENT (OUTPATIENT)
Dept: RADIOLOGY | Facility: CLINIC | Age: 82
End: 2022-06-15
Attending: EMERGENCY MEDICINE
Payer: COMMERCIAL

## 2022-06-15 ENCOUNTER — HOSPITAL ENCOUNTER (EMERGENCY)
Facility: CLINIC | Age: 82
Discharge: HOME | End: 2022-06-15
Attending: EMERGENCY MEDICINE | Admitting: EMERGENCY MEDICINE
Payer: COMMERCIAL

## 2022-06-15 VITALS
HEIGHT: 69 IN | TEMPERATURE: 98.4 F | HEART RATE: 60 BPM | SYSTOLIC BLOOD PRESSURE: 113 MMHG | RESPIRATION RATE: 16 BRPM | OXYGEN SATURATION: 97 % | WEIGHT: 189 LBS | BODY MASS INDEX: 27.99 KG/M2 | DIASTOLIC BLOOD PRESSURE: 57 MMHG

## 2022-06-15 DIAGNOSIS — I47.10 SVT (SUPRAVENTRICULAR TACHYCARDIA) (H): ICD-10-CM

## 2022-06-15 DIAGNOSIS — Z86.69 HISTORY OF PARKINSON'S DISEASE: ICD-10-CM

## 2022-06-15 DIAGNOSIS — R07.9 CHEST PAIN, UNSPECIFIED TYPE: ICD-10-CM

## 2022-06-15 LAB
ALBUMIN UR-MCNC: NEGATIVE MG/DL
ANION GAP SERPL CALCULATED.3IONS-SCNC: 8 MMOL/L (ref 5–18)
APPEARANCE UR: CLEAR
ATRIAL RATE - MUSE: 71 BPM
ATRIAL RATE - MUSE: 89 BPM
BASOPHILS # BLD AUTO: 0 10E3/UL (ref 0–0.2)
BASOPHILS NFR BLD AUTO: 0 %
BILIRUB UR QL STRIP: NEGATIVE
BNP SERPL-MCNC: 61 PG/ML (ref 0–88)
BUN SERPL-MCNC: 14 MG/DL (ref 8–28)
CALCIUM SERPL-MCNC: 9 MG/DL (ref 8.5–10.5)
CHLORIDE BLD-SCNC: 103 MMOL/L (ref 98–107)
CO2 SERPL-SCNC: 28 MMOL/L (ref 22–31)
COLOR UR AUTO: COLORLESS
CREAT SERPL-MCNC: 1.12 MG/DL (ref 0.7–1.3)
DIASTOLIC BLOOD PRESSURE - MUSE: 61 MMHG
DIASTOLIC BLOOD PRESSURE - MUSE: 83 MMHG
EOSINOPHIL # BLD AUTO: 0.1 10E3/UL (ref 0–0.7)
EOSINOPHIL NFR BLD AUTO: 2 %
ERYTHROCYTE [DISTWIDTH] IN BLOOD BY AUTOMATED COUNT: 13.3 % (ref 10–15)
GFR SERPL CREATININE-BSD FRML MDRD: 66 ML/MIN/1.73M2
GLUCOSE BLD-MCNC: 112 MG/DL (ref 70–125)
GLUCOSE UR STRIP-MCNC: NEGATIVE MG/DL
HCT VFR BLD AUTO: 43.2 % (ref 40–53)
HGB BLD-MCNC: 14.1 G/DL (ref 13.3–17.7)
HGB UR QL STRIP: NEGATIVE
HOLD SPECIMEN: NORMAL
IMM GRANULOCYTES # BLD: 0 10E3/UL
IMM GRANULOCYTES NFR BLD: 0 %
INTERPRETATION ECG - MUSE: NORMAL
INTERPRETATION ECG - MUSE: NORMAL
KETONES UR STRIP-MCNC: NEGATIVE MG/DL
LACTATE SERPL-SCNC: 1 MMOL/L (ref 0.7–2)
LEUKOCYTE ESTERASE UR QL STRIP: NEGATIVE
LYMPHOCYTES # BLD AUTO: 2.1 10E3/UL (ref 0.8–5.3)
LYMPHOCYTES NFR BLD AUTO: 27 %
MAGNESIUM SERPL-MCNC: 2.2 MG/DL (ref 1.8–2.6)
MCH RBC QN AUTO: 31.1 PG (ref 26.5–33)
MCHC RBC AUTO-ENTMCNC: 32.6 G/DL (ref 31.5–36.5)
MCV RBC AUTO: 95 FL (ref 78–100)
MONOCYTES # BLD AUTO: 0.7 10E3/UL (ref 0–1.3)
MONOCYTES NFR BLD AUTO: 9 %
MUCOUS THREADS #/AREA URNS LPF: PRESENT /LPF
NEUTROPHILS # BLD AUTO: 4.7 10E3/UL (ref 1.6–8.3)
NEUTROPHILS NFR BLD AUTO: 62 %
NITRATE UR QL: NEGATIVE
NRBC # BLD AUTO: 0 10E3/UL
NRBC BLD AUTO-RTO: 0 /100
P AXIS - MUSE: 30 DEGREES
P AXIS - MUSE: 43 DEGREES
PH UR STRIP: 6.5 [PH] (ref 5–7)
PLATELET # BLD AUTO: 188 10E3/UL (ref 150–450)
POTASSIUM BLD-SCNC: 4.7 MMOL/L (ref 3.5–5)
PR INTERVAL - MUSE: 194 MS
PR INTERVAL - MUSE: 196 MS
QRS DURATION - MUSE: 116 MS
QRS DURATION - MUSE: 124 MS
QT - MUSE: 348 MS
QT - MUSE: 390 MS
QTC - MUSE: 423 MS
QTC - MUSE: 423 MS
R AXIS - MUSE: -58 DEGREES
R AXIS - MUSE: -63 DEGREES
RBC # BLD AUTO: 4.54 10E6/UL (ref 4.4–5.9)
RBC URINE: 0 /HPF
SODIUM SERPL-SCNC: 139 MMOL/L (ref 136–145)
SP GR UR STRIP: 1 (ref 1–1.03)
SYSTOLIC BLOOD PRESSURE - MUSE: 120 MMHG
SYSTOLIC BLOOD PRESSURE - MUSE: 98 MMHG
T AXIS - MUSE: 35 DEGREES
T AXIS - MUSE: 7 DEGREES
TROPONIN I SERPL-MCNC: <0.01 NG/ML (ref 0–0.29)
TROPONIN I SERPL-MCNC: <0.01 NG/ML (ref 0–0.29)
UROBILINOGEN UR STRIP-MCNC: <2 MG/DL
VENTRICULAR RATE- MUSE: 71 BPM
VENTRICULAR RATE- MUSE: 89 BPM
WBC # BLD AUTO: 7.7 10E3/UL (ref 4–11)
WBC URINE: <1 /HPF

## 2022-06-15 PROCEDURE — 83880 ASSAY OF NATRIURETIC PEPTIDE: CPT | Performed by: EMERGENCY MEDICINE

## 2022-06-15 PROCEDURE — 250N000013 HC RX MED GY IP 250 OP 250 PS 637: Performed by: EMERGENCY MEDICINE

## 2022-06-15 PROCEDURE — 84484 ASSAY OF TROPONIN QUANT: CPT | Mod: 91 | Performed by: EMERGENCY MEDICINE

## 2022-06-15 PROCEDURE — 83605 ASSAY OF LACTIC ACID: CPT | Performed by: EMERGENCY MEDICINE

## 2022-06-15 PROCEDURE — 92960 CARDIOVERSION ELECTRIC EXT: CPT

## 2022-06-15 PROCEDURE — 96361 HYDRATE IV INFUSION ADD-ON: CPT

## 2022-06-15 PROCEDURE — 96374 THER/PROPH/DIAG INJ IV PUSH: CPT | Mod: 59

## 2022-06-15 PROCEDURE — 71045 X-RAY EXAM CHEST 1 VIEW: CPT

## 2022-06-15 PROCEDURE — 36415 COLL VENOUS BLD VENIPUNCTURE: CPT | Performed by: EMERGENCY MEDICINE

## 2022-06-15 PROCEDURE — 83735 ASSAY OF MAGNESIUM: CPT | Performed by: EMERGENCY MEDICINE

## 2022-06-15 PROCEDURE — 81001 URINALYSIS AUTO W/SCOPE: CPT | Performed by: EMERGENCY MEDICINE

## 2022-06-15 PROCEDURE — 93005 ELECTROCARDIOGRAM TRACING: CPT | Performed by: EMERGENCY MEDICINE

## 2022-06-15 PROCEDURE — 80048 BASIC METABOLIC PNL TOTAL CA: CPT | Performed by: EMERGENCY MEDICINE

## 2022-06-15 PROCEDURE — 85025 COMPLETE CBC W/AUTO DIFF WBC: CPT | Performed by: EMERGENCY MEDICINE

## 2022-06-15 PROCEDURE — 258N000003 HC RX IP 258 OP 636: Performed by: EMERGENCY MEDICINE

## 2022-06-15 PROCEDURE — 250N000011 HC RX IP 250 OP 636: Performed by: EMERGENCY MEDICINE

## 2022-06-15 PROCEDURE — 99285 EMERGENCY DEPT VISIT HI MDM: CPT | Mod: 25

## 2022-06-15 RX ORDER — ADENOSINE 3 MG/ML
6 INJECTION, SOLUTION INTRAVENOUS ONCE
Status: COMPLETED | OUTPATIENT
Start: 2022-06-15 | End: 2022-06-15

## 2022-06-15 RX ORDER — CARBIDOPA AND LEVODOPA 25; 100 MG/1; MG/1
2 TABLET ORAL ONCE
Status: COMPLETED | OUTPATIENT
Start: 2022-06-15 | End: 2022-06-15

## 2022-06-15 RX ADMIN — CARBIDOPA AND LEVODOPA 2 TABLET: 25; 100 TABLET ORAL at 14:13

## 2022-06-15 RX ADMIN — ADENOSINE 6 MG: 3 INJECTION, SOLUTION INTRAVENOUS at 12:32

## 2022-06-15 RX ADMIN — SODIUM CHLORIDE 500 ML: 9 INJECTION, SOLUTION INTRAVENOUS at 12:17

## 2022-06-15 ASSESSMENT — ENCOUNTER SYMPTOMS
DIARRHEA: 0
EYES NEGATIVE: 1
CHEST TIGHTNESS: 0
SINUS PRESSURE: 0
NUMBNESS: 0
PALPITATIONS: 0
HEADACHES: 0
FREQUENCY: 1
VOMITING: 0
BLOOD IN STOOL: 0
DYSURIA: 0
TROUBLE SWALLOWING: 0
SHORTNESS OF BREATH: 0
NAUSEA: 0
SORE THROAT: 0
CHILLS: 0
FEVER: 0

## 2022-06-15 NOTE — ED NOTES
EMERGENCY DEPARTMENT SIGN OUT NOTE        ED COURSE AND MEDICAL DECISION MAKING  Patient was signed out to me by Dr Simran Martinez at 2:15 PM.    In brief, Al Lynn is a 81 year old male who initially presented chest pain and heart racing sensation.EKG showed SVT and patient was successfully cardioverted with adenosine.     At time of sign out, disposition was pending delta troponin at 2:51 PM.    3:38 PM I rechecked and updated the patient with results and plan for discharge.     MDM: 82 y/o M with h/o of HTN, parkinson's, among others who presented with SVT and converted with adenosine. Prior provider talked to cardiology and plan for follow up with EP. Was signed out to me pending delta troponin. This was <0.01. I met with patient he has no chest pain or other symptoms, vitals are reassuring and he and daughter feel comfortable with discharge home. Strict return precautions discussed and they are in agreement with plan and their questions were answered.        FINAL IMPRESSION    1. SVT (supraventricular tachycardia) (H)    2. Chest pain, unspecified type    3. History of Parkinson's disease        ED MEDS  Medications   0.9% sodium chloride BOLUS (0 mLs Intravenous Stopped 6/15/22 1405)   adenosine (ADENOCARD) injection 6 mg (6 mg Intravenous Given 6/15/22 1232)   carbidopa-levodopa (SINEMET)  MG per tablet 2 tablet (2 tablets Oral Given 6/15/22 1413)       LAB  Labs Ordered and Resulted from Time of ED Arrival to Time of ED Departure   ROUTINE UA WITH MICROSCOPIC REFLEX TO CULTURE - Abnormal       Result Value    Color Urine Colorless      Appearance Urine Clear      Glucose Urine Negative      Bilirubin Urine Negative      Ketones Urine Negative      Specific Gravity Urine 1.005      Blood Urine Negative      pH Urine 6.5      Protein Albumin Urine Negative      Urobilinogen Urine <2.0      Nitrite Urine Negative      Leukocyte Esterase Urine Negative      Mucus Urine Present (*)     RBC Urine 0       WBC Urine <1     BASIC METABOLIC PANEL - Normal    Sodium 139      Potassium 4.7      Chloride 103      Carbon Dioxide (CO2) 28      Anion Gap 8      Urea Nitrogen 14      Creatinine 1.12      Calcium 9.0      Glucose 112      GFR Estimate 66     TROPONIN I - Normal    Troponin I <0.01     MAGNESIUM - Normal    Magnesium 2.2     B-TYPE NATRIURETIC PEPTIDE ([facility] ONLY) - Normal    BNP 61     LACTIC ACID WHOLE BLOOD - Normal    Lactic Acid 1.0     TROPONIN I - Normal    Troponin I <0.01     CBC WITH PLATELETS AND DIFFERENTIAL    WBC Count 7.7      RBC Count 4.54      Hemoglobin 14.1      Hematocrit 43.2      MCV 95      MCH 31.1      MCHC 32.6      RDW 13.3      Platelet Count 188      % Neutrophils 62      % Lymphocytes 27      % Monocytes 9      % Eosinophils 2      % Basophils 0      % Immature Granulocytes 0      NRBCs per 100 WBC 0      Absolute Neutrophils 4.7      Absolute Lymphocytes 2.1      Absolute Monocytes 0.7      Absolute Eosinophils 0.1      Absolute Basophils 0.0      Absolute Immature Granulocytes 0.0      Absolute NRBCs 0.0         RADIOLOGY    XR Chest Port 1 View   Final Result   IMPRESSION: Negative chest.          DISCHARGE MEDS  New Prescriptions    No medications on file     Katrina Hughes MD  [facility] EMERGENCY ROOM  [address] [address] [phone]     Katrina Hughes MD  06/15/22 0527

## 2022-06-15 NOTE — ED NOTES
Patient using urinal x2 with staff assist, he states he has had to go more frequently.  Attempted vagal  maneuvers with Dr. Martinez without effect.

## 2022-06-15 NOTE — ED PROVIDER NOTES
EMERGENCY DEPARTMENT ENCOUNTER      NAME: Al Lynn  AGE: 81 year old male  YOB: 1940  MRN: [id number]  EVALUATION DATE & TIME: 6/15/2022 11:37 AM    PCP: Joann Guzman    ED PROVIDER: Simran Martinez M.D.      CHIEF COMPLAINT     Chief Complaint   Patient presents with     Chest Pain     Hypotension     Tachycardia         FINAL IMPRESSION:     1. SVT (supraventricular tachycardia) (H)    2. Chest pain, unspecified type    3. History of Parkinson's disease          MEDICAL DECISION MAKING:       Pertinent Labs & Imaging studies reviewed. (See chart for details)    81 year old male presents to the Emergency Department for evaluation of difficulty breathing    ED Course as of 06/15/22 1425   Wed Juan M 15, 2022   1224 Mr. Lynn 89-year-old male who presents here via EMS.  He lives in assisted living they did a welfare check on him and noticed that he was hypotensive was having difficulty breathing and they brought him here.  Per patient he said he was not having any complaints denies having any chest pain or shortness of breath.   1224 He denies any recent vomiting or diarrhea.   1224 Patient noted to be tachycardic and a supraventricular rhythm.   1224 He denies feeling dizzy.  Per reported palpation urine smelled strong.   1225 On examination patient is nontoxic cooperative and pleasant has facies of Parkinson's.  Oriented x3.  Tachycardic irregular.  Abdomen is soft.  Extremities without edema.   1225 Differential diagnoses include but not limited to electrolyte abnormalities.  Concerning signs symptoms sepsis dehydration among others.   1225 IV established patient placed in postop cardiac and blood pressure monitor.  Given findings this is of normal saline..   1225 I reviewed previous records she seen by Dr. Main  History of SVT  Recommendation for follow-up with electrophysiologist.   1227 Attempted vagal maneuvers.  No change in patient's rhythm.   1227 Previous records reveal the  patient converted after 6 mg of adenosine.   1344 Blood Urine: Negative   1344 [place] Draw   1344 MCV: 95   1358 Spoke with Dr. Navarro cardiologist who was able to review EKGs.  I agree that patient needs to see electrophysiologist.  He is going to place a referral.   1358 On further questioning when I asked patient whether he had any chest pain or shortness of breath he says if he did is currently gone.  Does not want to be admitted.  We will do a delta troponin if negative plan discharge.   1409 Up with patient's daughter.  Feels comfortable with the plan.  She is in her way to pick him up.  Awaiting delta troponin and EKG.  Patient will be signed out to the oncoming physician.   1409 Troponin I (now)   1411 spoke with radiologist.  Said chest x-ray does not look like a pericardial effusion.   1424 Clinical impression and decision making 81-year-old male presents here via EMS.  Patient is in assisted living and they went to do a regular check and they noticed that his pressure was low and that his heart rate was up.  Patient states he was feeling as he is usual.  He said if he did have chest pain he was for a short amount of time does not recall having any shortness of breath they report concern about a UTI.   1424 Examination patient in no distress cooperative and pleasant oriented x3 stigmata of Parkinson's.  Noted to be an SVT with vagal maneuvers did not convert given adenosine converted.  After conversation he denies any chest pain shortness of breath abdominal pain dizziness.  Urine with no signs of infection normal electrolytes discussed with cardiologist and will make arrangement to see electrophysiologist.  Spoke with patient daughter and wife over the phone they feel comfortable with this plan.  We will do a delta troponin and repeat EKG if negative patient will be discharged with follow-up with cardiology.     Vital Signs: Tachypneic  EKG: SVT  Imaging: Chest x-ray no acute  Home Meds: Reviewed  ED  meds/abx: adenosine  Fluids: 500 NS    Labs  K 4.7  Cr 1.12  Wbc 7.7  Hgb 14.1  Platelets 188  UA negative  BNP 61  Troponin negative      Review of Previous Records    Cardiology note  3/2022    Assessment/Recommendations   Assessment:    1.  Paroxysmal SVT, converted with 6 mg IV adenosine back to sinus rhythm.  He had been on atenolol 50 mg daily for many years now both for treatment of hypertension as well as SVT.  This dose had recently been reduced to 25 mg daily due to bradycardia.  Following his SVT episode, dose has been increased back to 50 mg daily.  We will continue this dose for now but did suggest a Holter monitor to evaluate for profound bradycardia.  Also recommended referral to EP regarding feasibility of an ablation procedure to eradicate his SVT.    2. Possible syncope.  Patient reportedly had a fall in the garage of the living facility he resides in.  Does not recall feeling lightheaded prior to the event or sense of rapid heart beating.  Remembers being on the ground but unable to move his arms and legs but could raise his head.  Ultimately found but did not undergo immediate medical evaluation.  3.  Bradycardia with heart rates documented into the 40s at his assisted living facility.  For this reason he was recently advised to cut his atenolol to 25 mg daily.  Again this was recently increased following his episode of SVT.  4.  Parkinson's disease        10/2021   Final Conclusion    1.  Normal left ventricular chamber size and systolic function. Estimated left ventricular   ejection fraction is 60%. No regional wall    motion abnormalities. Normal left ventricular wall thickness.    2.  Normal right ventricular size and systolic function.    3.  Mild left atrial enlargement.    4.  No significant valvular heart disease.    5.  Estimated right ventricular systolic pressure is mildly increased at 30 mmHg plus RA   pressure.    No previous echo for comparison.      Estimated EF: 60%         Patient was seen in the  ED on 2/22 for evaluation of tachycardia. Below is the MDM from his ED visit:    Patient EKG on admission to the ED showed SVT.  Due to recent decrease of atenolol, patient was given original dose of 50 mg atenolol.  Slow down heart rate slightly but not to desired degree.  Repeat 8 EKG showed continued SVT but at slower rate.  Patient was then given 6 mg of adenosine and heart rate returned to baseline of mid 50s bpm.  Blood pressure stayed steady in 110s/60s.  Patient sent home on old dose of 50 mg atenolol and advised follow-up with cardiology for medical management within 1 week.     At the conclusion of the encounter I discussed the results of all the tests and the disposition. The questions were answered. The patient or family acknowledged understanding and was agreeable with the care plan.    Consults  Cardiology - Dr. Navarro     ED COURSE     11:50 PM I met the patient and performed my initial interview and exam.    12:03 PM patient when to the bathroom and didn't have any chest pain, shortness of breath, or dizziness     12:17 PM I rechecked and updated the patient    12:36 PM performed cardioversion      1:06 PM I rechecked and updated the patient regarding results and plan of care.     1:36 PM Pending call from cardiology.     1:39 PM patient usually takes his Parkinson's medication at 1:00 PM, I have ordered his home medication.    1:40 PM Spoke with patient's wife. Per wife she said the reason why they are here is because his heart rate was going slow and fast.  Is that he had this before.  Having some diarrhea.    1:52 PM I rechecked and updated the patient    1:55 PM I spoke with Dr. Navarro, cardiology, regarding patient plan of care.     2:08 PM with patient's daughter.  She is going to come  patient.  Notify of need for follow-up with electrophysiology.  He will be receiving a call.  All questions answered.    At the conclusion of the encounter I discussed  the results of all of the tests and the disposition. The questions were answered. The patient and daughter over the phone acknowledged understanding and was agreeable with the care plan.       35 minutes of critical care time     MEDICATIONS GIVEN IN THE EMERGENCY:     Medications   0.9% sodium chloride BOLUS (0 mLs Intravenous Stopped 6/15/22 1405)   adenosine (ADENOCARD) injection 6 mg (6 mg Intravenous Given 6/15/22 1232)   carbidopa-levodopa (SINEMET)  MG per tablet 2 tablet (2 tablets Oral Given 6/15/22 1413)       NEW PRESCRIPTIONS STARTED AT TODAY'S ER VISIT     New Prescriptions    No medications on file          =================================================================    HPI     Patient information was obtained from: patient and EMS     Use of : N/A        Juan Carlosparis Lynn is a 81 year old male with a history of hypertension, depression, colon polyp, diverticulitis, hemorrhoids, Parkinson, who presents by EMS for evaluation of low blood sugar.     Per EMS: patient lives in assisted living in [place] was brought to the ED by EMS due to low blood pressure results from a routine check up. EMS noticed that patient was breathing fast, his blood pressure was in the 90's systolic, pulse was 145-150 bpm. While he was still in the independent living he was frequently urinating. EMS gave patient Asprin.    Patient and his wife report that he frequently has episodes where his heart rate goes very fast then very slow. These episodes last about 5 minutes at a time. This is not new for him. This happened to the patient earlier today but did not resolve.     Patient reports history of appendectomy. Patient is taking all of his medications as prescribed. Denies history of pacemaker. Denies any recent falls. Patient denies chest pain, shortness of breath, belly pain, nausea, vomit, blood in stool and urine, rashes, fever, headache, nose, eyes, throat, or any other complaints at this time.      REVIEW OF SYSTEMS   Review of Systems   Constitutional: Negative for chills and fever.   HENT: Negative for nosebleeds, sinus pressure, sore throat and trouble swallowing.    Eyes: Negative.    Respiratory: Negative for chest tightness and shortness of breath.    Cardiovascular: Negative for chest pain, palpitations and leg swelling.        Positive for hypotension   Gastrointestinal: Negative for blood in stool, diarrhea, nausea and vomiting.   Genitourinary: Positive for frequency. Negative for dysuria.   Skin: Negative for rash.   Neurological: Negative for numbness and headaches.   All other systems reviewed and are negative.     PAST MEDICAL HISTORY:     Past Medical History:   Diagnosis Date     Colon polyp      Cough      Depression      Diverticulitis      Head injury     fell working in garden     Hearing loss      Hemorrhoids      HTN (hypertension)      Sinus drainage     coughing spells?       PAST SURGICAL HISTORY:     Past Surgical History:   Procedure Laterality Date     ESOPHAGOSCOPY, GASTROSCOPY, DUODENOSCOPY (EGD), COMBINED  4/21/2014    Procedure: Gastroscopy ;  Surgeon: Bradley Colmenares MD;  Location: [facility]     HERNIA REPAIR, INGUINAL RT/LT       [facility] APPENDECTOMY           CURRENT MEDICATIONS:   aspirin 81 MG EC tablet  atenolol (TENORMIN) 50 MG tablet  carbidopa-levodopa (SINEMET)  MG tablet  diphenhydrAMINE (BENADRYL) 50 MG capsule  donepezil (ARICEPT) 5 MG tablet  fish oil-omega-3 fatty acids 1000 MG capsule  LANsoprazole (PREVACID) 30 MG capsule  multivitamin, therapeutic (THERA-VIT) TABS tablet  oxybutynin ER (DITROPAN-XL) 10 MG 24 hr tablet  traZODone (DESYREL) 50 MG tablet  trospium (SANCTURA XR) 60 MG CP24 24 hr capsule  Vitamin D, Cholecalciferol, 25 MCG (1000 UT) TABS  vitamin E (TOCOPHEROL) 200 units (90 mg) capsule         ALLERGIES:     Allergies   Allergen Reactions     Nka [No Known Allergies]        FAMILY HISTORY:     Family History   Problem Relation Age of Onset      FRANCISCO JAVIER. Father      Cancer Brother      Neurologic Disorder Brother         parkinsons       SOCIAL HISTORY:     Social History     Socioeconomic History     Marital status:    Tobacco Use     Smoking status: Former Smoker     Smokeless tobacco: Never Used   Substance and Sexual Activity     Alcohol use: Yes     Comment: every day     Drug use: No       VITALS:   /83   Pulse 77   Temp 98.4  F (36.9  C)   Resp 20   SpO2 96%     PHYSICAL EXAM     Physical Exam  Vitals and nursing note reviewed.   Constitutional:       Appearance: He is well-developed.      Comments: Younger than stated age.   Skin:     Capillary Refill: Capillary refill takes less than 2 seconds.   Neurological:      General: No focal deficit present.      Mental Status: He is alert and oriented to person, place, and time.      Comments: Stigmata of Parkinson's.         Physical Exam   Constitutional: No distress.     Head: Atraumatic.     Nose: Nose normal.     Mouth/Throat: Oropharynx is clear and moist.     Eyes: EOM are normal. Pupils are equal, round, and reactive to light.     Ears: External ears normal    Neck: Normal range of motion. Neck supple.      Cardiovascular: Tachycardic, regular rhythm and normal heart sounds.      Pulmonary/Chest: Normal effort  and breath sounds normal.     Abdominal: old surgical scar. Non tender.      Musculoskeletal: Normal range of motion.     Neurological: no focal deficits    Lymphatics: no edema    Skin: Skin is warm and dry.     Psychiatric: Normal mood with flat affect. Behavior is normal.       LAB:     All pertinent labs reviewed and interpreted.  Labs Ordered and Resulted from Time of ED Arrival to Time of ED Departure   ROUTINE UA WITH MICROSCOPIC REFLEX TO CULTURE - Abnormal       Result Value    Color Urine Colorless      Appearance Urine Clear      Glucose Urine Negative      Bilirubin Urine Negative      Ketones Urine Negative      Specific Gravity Urine 1.005      Blood Urine  Negative      pH Urine 6.5      Protein Albumin Urine Negative      Urobilinogen Urine <2.0      Nitrite Urine Negative      Leukocyte Esterase Urine Negative      Mucus Urine Present (*)     RBC Urine 0      WBC Urine <1     BASIC METABOLIC PANEL - Normal    Sodium 139      Potassium 4.7      Chloride 103      Carbon Dioxide (CO2) 28      Anion Gap 8      Urea Nitrogen 14      Creatinine 1.12      Calcium 9.0      Glucose 112      GFR Estimate 66     TROPONIN I - Normal    Troponin I <0.01     MAGNESIUM - Normal    Magnesium 2.2     B-TYPE NATRIURETIC PEPTIDE ([facility] ONLY) - Normal    BNP 61     LACTIC ACID WHOLE BLOOD - Normal    Lactic Acid 1.0     CBC WITH PLATELETS AND DIFFERENTIAL    WBC Count 7.7      RBC Count 4.54      Hemoglobin 14.1      Hematocrit 43.2      MCV 95      MCH 31.1      MCHC 32.6      RDW 13.3      Platelet Count 188      % Neutrophils 62      % Lymphocytes 27      % Monocytes 9      % Eosinophils 2      % Basophils 0      % Immature Granulocytes 0      NRBCs per 100 WBC 0      Absolute Neutrophils 4.7      Absolute Lymphocytes 2.1      Absolute Monocytes 0.7      Absolute Eosinophils 0.1      Absolute Basophils 0.0      Absolute Immature Granulocytes 0.0      Absolute NRBCs 0.0     TROPONIN I        RADIOLOGY:     Reviewed all pertinent imaging. Please see official radiology report.  XR Chest Port 1 View   Final Result   IMPRESSION: Negative chest.           EKG:     EKG #1  Tachycardic regular poor anterior progression of axis deviation criteria for LVH inverted T waves in V1 V2    Time:882452    Ventricular rate 136  bmp  [place] LAD  ME interval ms  QRS duration 114 ms  QT//4163 ms    Compared to previous EKG on February 2022 accelerated junctional rhythm 121 was a high rate inverted T waves 1 and aVL was seen before.  I have independently reviewed and interpreted the EKG(s) documented above.    EKG #2  After adenosine  Normal sinus rhythm poor anterior progression left anterior  fascicular block LVH.    Time:[id number]    Ventricular rate 89 bmp  Axis LAD left anterior fascicular block VA  VA interval 196 ms  QRS duration 124 ms  QT//423 ms    Compared to previous EKG on patient now back in sinus rhythm.  This is 124.  PROCEDURES:     Procedures   PROCEDURE: Chemical Cardioversion    INDICATIONS: SVT   CARDIOVERSION TYPE: Chemical, Adenosine   CARDIOVERSION PROVIDER: Dr Simran Martinez   SEDATION: None   CONSENT: Risks, benefits and alternatives were discussed with and Verbal consent was obtained from Patient.   PROCEDURE SPECIFIC CHECKLIST COMPLETED: Yes   TIME OUT: Universal protocol was followed. TIME OUT conducted just prior to starting procedure confirmed patient identity, site/side, procedure, patient position, and availability of correct equipment. Yes.   MEDICATIONS GIVEN: 6 mg Adenosine, IV push, followed by rapid flush     MONITORING: Heart rate, cardiac monitor, continuous pulse oximeter, frequent blood pressure checks, level of consciousness checks, IV access, constant attendance by RN until patient is recovered and aconstant attendance by MD until patient is stable   RESPONSE: Vital signs stable, airway patent and O2 saturations remained >92%     Brief pause, then converted to sinus rhythm.   COMPLICATIONS: Patient tolerated procedure well, without complication           IJd Abbie C, am serving as a scribe to document services personally performed by Dr. Martinez based on my observation and the provider's statements to me. ISimran MD attest that Deepthi Miles is acting in a scribe capacity, has observed my performance of the services and has documented them in accordance with my direction.    Simran Martinez M.D.  Emergency Medicine  [facility] EMERGENCY ROOM  [address] [facility] [id number] [phone]  Dept: [phone]     Simran Martinez MD  06/15/22 6009

## 2022-06-15 NOTE — DISCHARGE INSTRUCTIONS
Read and follow the discharge instructions.    Continue your current medications.  We Gave you your afternoon dose of your Parkinson medication.    Your blood work including your urine was normal.    You need to follow-up with a special cardiologist called ab electrophysiologist.  You will be is receiving a call for follow-up.    Return or call 911 for chest pain shortness of breath feeling fainting or any other concerns.

## 2022-06-15 NOTE — ED TRIAGE NOTES
Pt arrives via EMS from [facility]. The staff there were concerned that he was breathing faster, hypotensive and having more frequent urination. Pts only c/o chest tightness that has been ongoing for years.      Triage Assessment     Row Name 06/15/22 1139       Triage Assessment (Adult)    Airway WDL WDL       Respiratory WDL    Respiratory WDL WDL       Skin Circulation/Temperature WDL    Skin Circulation/Temperature WDL WDL       Cardiac WDL    Cardiac WDL chest pain       Chest Pain Assessment    Chest Pain Location midsternal       Peripheral/Neurovascular WDL    Peripheral Neurovascular WDL WDL       Cognitive/Neuro/Behavioral WDL    Cognitive/Neuro/Behavioral WDL WDL

## 2022-06-15 NOTE — ED NOTES
Patient was able to cardiovert after 6mg of Adenosine with MD at bedside.  ECG performed.  Blood pressure titrated up.  Patient states pain with procedure but tolerable now.

## 2022-06-16 ENCOUNTER — TELEPHONE (OUTPATIENT)
Dept: CARDIOLOGY | Facility: CLINIC | Age: 82
End: 2022-06-16

## 2022-06-16 LAB
ATRIAL RATE - MUSE: 138 BPM
DIASTOLIC BLOOD PRESSURE - MUSE: NORMAL MMHG
INTERPRETATION ECG - MUSE: NORMAL
P AXIS - MUSE: NORMAL DEGREES
PR INTERVAL - MUSE: NORMAL MS
QRS DURATION - MUSE: 114 MS
QT - MUSE: 308 MS
QTC - MUSE: 463 MS
R AXIS - MUSE: -66 DEGREES
SYSTOLIC BLOOD PRESSURE - MUSE: NORMAL MMHG
T AXIS - MUSE: 84 DEGREES
VENTRICULAR RATE- MUSE: 136 BPM

## 2022-06-16 NOTE — TELEPHONE ENCOUNTER
"Noted per Dr. Main's 3-3-22 visit note \"Referral to EP for possible ablation\" - noted patient had been scheduled to see Dr. Kaur on 3-22-22 and 4-28-22 - both appts cancelled - no reason documented.    Msg sent to sched team with request to resched EP consult as previously recommended.  mg  "

## 2022-06-16 NOTE — TELEPHONE ENCOUNTER
----- Message from Charanjit Navarro MD sent at 6/15/2022  1:58 PM CDT -----  This is a patient of Dr. Main.  He was supposed to be seen by EP.  He was back in emergency room with SVT.  He needs to see Vincent

## 2022-06-20 ENCOUNTER — DOCUMENTATION ONLY (OUTPATIENT)
Dept: OTHER | Facility: CLINIC | Age: 82
End: 2022-06-20
Payer: COMMERCIAL

## 2022-08-02 ENCOUNTER — LAB REQUISITION (OUTPATIENT)
Dept: LAB | Facility: CLINIC | Age: 82
End: 2022-08-02
Payer: COMMERCIAL

## 2022-08-02 DIAGNOSIS — I95.0 IDIOPATHIC HYPOTENSION: ICD-10-CM

## 2022-08-02 DIAGNOSIS — R00.0 TACHYCARDIA, UNSPECIFIED: ICD-10-CM

## 2022-08-03 LAB
ANION GAP SERPL CALCULATED.3IONS-SCNC: 11 MMOL/L (ref 7–15)
BASOPHILS # BLD AUTO: 0.1 10E3/UL (ref 0–0.2)
BASOPHILS NFR BLD AUTO: 1 %
BUN SERPL-MCNC: 17.6 MG/DL (ref 8–23)
CALCIUM SERPL-MCNC: 9.3 MG/DL (ref 8.8–10.2)
CHLORIDE SERPL-SCNC: 101 MMOL/L (ref 98–107)
CREAT SERPL-MCNC: 1.04 MG/DL (ref 0.67–1.17)
DEPRECATED HCO3 PLAS-SCNC: 27 MMOL/L (ref 22–29)
EOSINOPHIL # BLD AUTO: 0.1 10E3/UL (ref 0–0.7)
EOSINOPHIL NFR BLD AUTO: 2 %
ERYTHROCYTE [DISTWIDTH] IN BLOOD BY AUTOMATED COUNT: 13.2 % (ref 10–15)
GFR SERPL CREATININE-BSD FRML MDRD: 72 ML/MIN/1.73M2
GLUCOSE SERPL-MCNC: 81 MG/DL (ref 70–99)
HCT VFR BLD AUTO: 42.8 % (ref 40–53)
HGB BLD-MCNC: 14 G/DL (ref 13.3–17.7)
IMM GRANULOCYTES # BLD: 0 10E3/UL
IMM GRANULOCYTES NFR BLD: 0 %
LYMPHOCYTES # BLD AUTO: 2.5 10E3/UL (ref 0.8–5.3)
LYMPHOCYTES NFR BLD AUTO: 31 %
MCH RBC QN AUTO: 31.9 PG (ref 26.5–33)
MCHC RBC AUTO-ENTMCNC: 32.7 G/DL (ref 31.5–36.5)
MCV RBC AUTO: 98 FL (ref 78–100)
MONOCYTES # BLD AUTO: 0.8 10E3/UL (ref 0–1.3)
MONOCYTES NFR BLD AUTO: 10 %
NEUTROPHILS # BLD AUTO: 4.4 10E3/UL (ref 1.6–8.3)
NEUTROPHILS NFR BLD AUTO: 56 %
NRBC # BLD AUTO: 0 10E3/UL
NRBC BLD AUTO-RTO: 0 /100
PLATELET # BLD AUTO: 178 10E3/UL (ref 150–450)
POTASSIUM SERPL-SCNC: 4.2 MMOL/L (ref 3.4–5.3)
RBC # BLD AUTO: 4.39 10E6/UL (ref 4.4–5.9)
SODIUM SERPL-SCNC: 139 MMOL/L (ref 136–145)
WBC # BLD AUTO: 7.9 10E3/UL (ref 4–11)

## 2022-08-03 PROCEDURE — 36415 COLL VENOUS BLD VENIPUNCTURE: CPT | Mod: ORL | Performed by: NURSE PRACTITIONER

## 2022-08-03 PROCEDURE — 85025 COMPLETE CBC W/AUTO DIFF WBC: CPT | Mod: ORL | Performed by: NURSE PRACTITIONER

## 2022-08-03 PROCEDURE — P9604 ONE-WAY ALLOW PRORATED TRIP: HCPCS | Mod: ORL | Performed by: NURSE PRACTITIONER

## 2022-08-03 PROCEDURE — 80048 BASIC METABOLIC PNL TOTAL CA: CPT | Mod: ORL | Performed by: NURSE PRACTITIONER

## 2022-10-29 ENCOUNTER — HEALTH MAINTENANCE LETTER (OUTPATIENT)
Age: 82
End: 2022-10-29

## 2023-02-23 ENCOUNTER — LAB REQUISITION (OUTPATIENT)
Dept: LAB | Facility: CLINIC | Age: 83
End: 2023-02-23
Payer: COMMERCIAL

## 2023-02-23 DIAGNOSIS — U07.1 COVID-19: ICD-10-CM

## 2023-02-23 DIAGNOSIS — J12.1 RESPIRATORY SYNCYTIAL VIRUS PNEUMONIA: ICD-10-CM

## 2023-02-23 DIAGNOSIS — N10 ACUTE PYELONEPHRITIS: ICD-10-CM

## 2023-02-23 LAB
FLUAV RNA SPEC QL NAA+PROBE: NEGATIVE
FLUBV RNA RESP QL NAA+PROBE: NEGATIVE
RSV RNA SPEC NAA+PROBE: NEGATIVE
SARS-COV-2 RNA RESP QL NAA+PROBE: NEGATIVE

## 2023-02-23 PROCEDURE — 87637 SARSCOV2&INF A&B&RSV AMP PRB: CPT | Mod: ORL | Performed by: NURSE PRACTITIONER

## 2023-05-23 ENCOUNTER — LAB REQUISITION (OUTPATIENT)
Dept: LAB | Facility: CLINIC | Age: 83
End: 2023-05-23
Payer: COMMERCIAL

## 2023-05-23 DIAGNOSIS — I10 ESSENTIAL (PRIMARY) HYPERTENSION: ICD-10-CM

## 2023-05-23 DIAGNOSIS — D64.9 ANEMIA, UNSPECIFIED: ICD-10-CM

## 2023-05-23 DIAGNOSIS — E55.9 VITAMIN D DEFICIENCY, UNSPECIFIED: ICD-10-CM

## 2023-05-24 LAB
ANION GAP SERPL CALCULATED.3IONS-SCNC: 10 MMOL/L (ref 7–15)
BASOPHILS # BLD AUTO: 0 10E3/UL (ref 0–0.2)
BASOPHILS NFR BLD AUTO: 1 %
BUN SERPL-MCNC: 12.1 MG/DL (ref 8–23)
CALCIUM SERPL-MCNC: 9 MG/DL (ref 8.8–10.2)
CHLORIDE SERPL-SCNC: 103 MMOL/L (ref 98–107)
CREAT SERPL-MCNC: 0.98 MG/DL (ref 0.67–1.17)
DEPRECATED CALCIDIOL+CALCIFEROL SERPL-MC: 50 UG/L (ref 20–75)
DEPRECATED HCO3 PLAS-SCNC: 25 MMOL/L (ref 22–29)
EOSINOPHIL # BLD AUTO: 0.1 10E3/UL (ref 0–0.7)
EOSINOPHIL NFR BLD AUTO: 1 %
ERYTHROCYTE [DISTWIDTH] IN BLOOD BY AUTOMATED COUNT: 13.2 % (ref 10–15)
GFR SERPL CREATININE-BSD FRML MDRD: 77 ML/MIN/1.73M2
GLUCOSE SERPL-MCNC: 101 MG/DL (ref 70–99)
HCT VFR BLD AUTO: 42.5 % (ref 40–53)
HGB BLD-MCNC: 13.5 G/DL (ref 13.3–17.7)
IMM GRANULOCYTES # BLD: 0.1 10E3/UL
IMM GRANULOCYTES NFR BLD: 1 %
LYMPHOCYTES # BLD AUTO: 1.6 10E3/UL (ref 0.8–5.3)
LYMPHOCYTES NFR BLD AUTO: 28 %
MCH RBC QN AUTO: 31.4 PG (ref 26.5–33)
MCHC RBC AUTO-ENTMCNC: 31.8 G/DL (ref 31.5–36.5)
MCV RBC AUTO: 99 FL (ref 78–100)
MONOCYTES # BLD AUTO: 0.6 10E3/UL (ref 0–1.3)
MONOCYTES NFR BLD AUTO: 9 %
NEUTROPHILS # BLD AUTO: 3.6 10E3/UL (ref 1.6–8.3)
NEUTROPHILS NFR BLD AUTO: 60 %
NRBC # BLD AUTO: 0 10E3/UL
NRBC BLD AUTO-RTO: 0 /100
PLATELET # BLD AUTO: 147 10E3/UL (ref 150–450)
POTASSIUM SERPL-SCNC: 4.5 MMOL/L (ref 3.4–5.3)
RBC # BLD AUTO: 4.3 10E6/UL (ref 4.4–5.9)
SODIUM SERPL-SCNC: 138 MMOL/L (ref 136–145)
WBC # BLD AUTO: 5.9 10E3/UL (ref 4–11)

## 2023-05-24 PROCEDURE — P9603 ONE-WAY ALLOW PRORATED MILES: HCPCS | Mod: ORL | Performed by: NURSE PRACTITIONER

## 2023-05-24 PROCEDURE — 85025 COMPLETE CBC W/AUTO DIFF WBC: CPT | Mod: ORL | Performed by: NURSE PRACTITIONER

## 2023-05-24 PROCEDURE — 36415 COLL VENOUS BLD VENIPUNCTURE: CPT | Mod: ORL | Performed by: NURSE PRACTITIONER

## 2023-05-24 PROCEDURE — 80048 BASIC METABOLIC PNL TOTAL CA: CPT | Mod: ORL | Performed by: NURSE PRACTITIONER

## 2023-05-24 PROCEDURE — 82306 VITAMIN D 25 HYDROXY: CPT | Mod: ORL | Performed by: NURSE PRACTITIONER

## 2023-06-01 ENCOUNTER — HEALTH MAINTENANCE LETTER (OUTPATIENT)
Age: 83
End: 2023-06-01

## 2024-01-23 ENCOUNTER — LAB REQUISITION (OUTPATIENT)
Dept: LAB | Facility: CLINIC | Age: 84
End: 2024-01-23
Payer: COMMERCIAL

## 2024-01-23 LAB
ALBUMIN UR-MCNC: 10 MG/DL
APPEARANCE UR: CLEAR
BILIRUB UR QL STRIP: NEGATIVE
COLOR UR AUTO: YELLOW
GLUCOSE UR STRIP-MCNC: NEGATIVE MG/DL
HGB UR QL STRIP: NEGATIVE
KETONES UR STRIP-MCNC: NEGATIVE MG/DL
LEUKOCYTE ESTERASE UR QL STRIP: NEGATIVE
MUCOUS THREADS #/AREA URNS LPF: PRESENT /LPF
NITRATE UR QL: NEGATIVE
PH UR STRIP: 6.5 [PH] (ref 5–7)
RBC URINE: <1 /HPF
SP GR UR STRIP: 1.03 (ref 1–1.03)
SQUAMOUS EPITHELIAL: 1 /HPF
UROBILINOGEN UR STRIP-MCNC: 3 MG/DL
WBC URINE: 1 /HPF

## 2024-01-23 PROCEDURE — 87086 URINE CULTURE/COLONY COUNT: CPT | Mod: ORL | Performed by: NURSE PRACTITIONER

## 2024-01-23 PROCEDURE — 81001 URINALYSIS AUTO W/SCOPE: CPT | Mod: ORL | Performed by: NURSE PRACTITIONER

## 2024-01-24 LAB — BACTERIA UR CULT: NORMAL

## 2024-01-30 ENCOUNTER — LAB REQUISITION (OUTPATIENT)
Dept: LAB | Facility: CLINIC | Age: 84
End: 2024-01-30
Payer: COMMERCIAL

## 2024-01-30 DIAGNOSIS — D64.9 ANEMIA, UNSPECIFIED: ICD-10-CM

## 2024-01-30 DIAGNOSIS — I10 ESSENTIAL (PRIMARY) HYPERTENSION: ICD-10-CM

## 2024-01-30 DIAGNOSIS — E03.9 HYPOTHYROIDISM, UNSPECIFIED: ICD-10-CM

## 2024-01-31 LAB
BASOPHILS # BLD AUTO: 0.1 10E3/UL (ref 0–0.2)
BASOPHILS NFR BLD AUTO: 1 %
EOSINOPHIL # BLD AUTO: 0.1 10E3/UL (ref 0–0.7)
EOSINOPHIL NFR BLD AUTO: 1 %
ERYTHROCYTE [DISTWIDTH] IN BLOOD BY AUTOMATED COUNT: 12.7 % (ref 10–15)
HCT VFR BLD AUTO: 42.4 % (ref 40–53)
HGB BLD-MCNC: 14 G/DL (ref 13.3–17.7)
IMM GRANULOCYTES # BLD: 0 10E3/UL
IMM GRANULOCYTES NFR BLD: 0 %
LYMPHOCYTES # BLD AUTO: 1.9 10E3/UL (ref 0.8–5.3)
LYMPHOCYTES NFR BLD AUTO: 26 %
MCH RBC QN AUTO: 32.3 PG (ref 26.5–33)
MCHC RBC AUTO-ENTMCNC: 33 G/DL (ref 31.5–36.5)
MCV RBC AUTO: 98 FL (ref 78–100)
MONOCYTES # BLD AUTO: 0.6 10E3/UL (ref 0–1.3)
MONOCYTES NFR BLD AUTO: 8 %
NEUTROPHILS # BLD AUTO: 4.7 10E3/UL (ref 1.6–8.3)
NEUTROPHILS NFR BLD AUTO: 64 %
NRBC # BLD AUTO: 0 10E3/UL
NRBC BLD AUTO-RTO: 0 /100
PLATELET # BLD AUTO: 165 10E3/UL (ref 150–450)
RBC # BLD AUTO: 4.34 10E6/UL (ref 4.4–5.9)
WBC # BLD AUTO: 7.3 10E3/UL (ref 4–11)

## 2024-01-31 PROCEDURE — 80048 BASIC METABOLIC PNL TOTAL CA: CPT | Mod: ORL | Performed by: NURSE PRACTITIONER

## 2024-01-31 PROCEDURE — P9603 ONE-WAY ALLOW PRORATED MILES: HCPCS | Mod: ORL | Performed by: NURSE PRACTITIONER

## 2024-01-31 PROCEDURE — 36415 COLL VENOUS BLD VENIPUNCTURE: CPT | Mod: ORL | Performed by: NURSE PRACTITIONER

## 2024-01-31 PROCEDURE — 84443 ASSAY THYROID STIM HORMONE: CPT | Mod: ORL | Performed by: NURSE PRACTITIONER

## 2024-01-31 PROCEDURE — 85025 COMPLETE CBC W/AUTO DIFF WBC: CPT | Mod: ORL | Performed by: NURSE PRACTITIONER

## 2024-02-01 LAB
ANION GAP SERPL CALCULATED.3IONS-SCNC: 15 MMOL/L (ref 7–15)
BUN SERPL-MCNC: 15.6 MG/DL (ref 8–23)
CALCIUM SERPL-MCNC: 8.9 MG/DL (ref 8.8–10.2)
CHLORIDE SERPL-SCNC: 102 MMOL/L (ref 98–107)
CREAT SERPL-MCNC: 0.95 MG/DL (ref 0.67–1.17)
DEPRECATED HCO3 PLAS-SCNC: 20 MMOL/L (ref 22–29)
EGFRCR SERPLBLD CKD-EPI 2021: 79 ML/MIN/1.73M2
GLUCOSE SERPL-MCNC: 106 MG/DL (ref 70–99)
POTASSIUM SERPL-SCNC: 4.2 MMOL/L (ref 3.4–5.3)
SODIUM SERPL-SCNC: 137 MMOL/L (ref 135–145)
TSH SERPL DL<=0.005 MIU/L-ACNC: 2.98 UIU/ML (ref 0.3–4.2)

## 2024-04-11 ENCOUNTER — MEDICAL CORRESPONDENCE (OUTPATIENT)
Dept: HEALTH INFORMATION MANAGEMENT | Facility: CLINIC | Age: 84
End: 2024-04-11

## 2024-05-08 ENCOUNTER — APPOINTMENT (OUTPATIENT)
Dept: RADIOLOGY | Facility: CLINIC | Age: 84
DRG: 177 | End: 2024-05-08
Attending: EMERGENCY MEDICINE
Payer: COMMERCIAL

## 2024-05-08 ENCOUNTER — HOSPITAL ENCOUNTER (INPATIENT)
Facility: CLINIC | Age: 84
LOS: 5 days | Discharge: NURSING, REHAB OR LONG-TERM CARE | DRG: 177 | End: 2024-05-13
Attending: EMERGENCY MEDICINE | Admitting: HOSPITALIST
Payer: COMMERCIAL

## 2024-05-08 ENCOUNTER — APPOINTMENT (OUTPATIENT)
Dept: CT IMAGING | Facility: CLINIC | Age: 84
DRG: 177 | End: 2024-05-08
Attending: EMERGENCY MEDICINE
Payer: COMMERCIAL
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
## 2024-05-08 DIAGNOSIS — I50.9 ACUTE CONGESTIVE HEART FAILURE, UNSPECIFIED HEART FAILURE TYPE (H): ICD-10-CM

## 2024-05-08 DIAGNOSIS — U07.1 COVID-19 VIRUS INFECTION: ICD-10-CM

## 2024-05-08 DIAGNOSIS — I47.10 SVT (SUPRAVENTRICULAR TACHYCARDIA) (H): Primary | ICD-10-CM

## 2024-05-08 DIAGNOSIS — R53.1 GENERAL WEAKNESS: ICD-10-CM
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
## 2024-05-08 LAB
ALBUMIN UR-MCNC: 50 MG/DL
ANION GAP SERPL CALCULATED.3IONS-SCNC: 7 MMOL/L (ref 7–15)
APPEARANCE UR: CLEAR
BASOPHILS # BLD AUTO: 0 10E3/UL (ref 0–0.2)
BASOPHILS NFR BLD AUTO: 0 %
BILIRUB UR QL STRIP: NEGATIVE
BUN SERPL-MCNC: 20.8 MG/DL (ref 8–23)
CALCIUM SERPL-MCNC: 8.6 MG/DL (ref 8.8–10.2)
CHLORIDE SERPL-SCNC: 105 MMOL/L (ref 98–107)
COLOR UR AUTO: YELLOW
CREAT SERPL-MCNC: 1.13 MG/DL (ref 0.67–1.17)
DEPRECATED HCO3 PLAS-SCNC: 26 MMOL/L (ref 22–29)
EGFRCR SERPLBLD CKD-EPI 2021: 64 ML/MIN/1.73M2
EOSINOPHIL # BLD AUTO: 0 10E3/UL (ref 0–0.7)
EOSINOPHIL NFR BLD AUTO: 0 %
ERYTHROCYTE [DISTWIDTH] IN BLOOD BY AUTOMATED COUNT: 13.1 % (ref 10–15)
FLUAV RNA SPEC QL NAA+PROBE: NEGATIVE
FLUBV RNA RESP QL NAA+PROBE: NEGATIVE
GLUCOSE SERPL-MCNC: 114 MG/DL (ref 70–99)
GLUCOSE UR STRIP-MCNC: NEGATIVE MG/DL
HCT VFR BLD AUTO: 39.7 % (ref 40–53)
HGB BLD-MCNC: 13.3 G/DL (ref 13.3–17.7)
HGB UR QL STRIP: NEGATIVE
IMM GRANULOCYTES # BLD: 0 10E3/UL
IMM GRANULOCYTES NFR BLD: 0 %
KETONES UR STRIP-MCNC: 20 MG/DL
LACTATE SERPL-SCNC: 1.1 MMOL/L (ref 0.7–2)
LEUKOCYTE ESTERASE UR QL STRIP: NEGATIVE
LYMPHOCYTES # BLD AUTO: 0.6 10E3/UL (ref 0.8–5.3)
LYMPHOCYTES NFR BLD AUTO: 7 %
MCH RBC QN AUTO: 32.2 PG (ref 26.5–33)
MCHC RBC AUTO-ENTMCNC: 33.5 G/DL (ref 31.5–36.5)
MCV RBC AUTO: 96 FL (ref 78–100)
MONOCYTES # BLD AUTO: 0.9 10E3/UL (ref 0–1.3)
MONOCYTES NFR BLD AUTO: 11 %
MUCOUS THREADS #/AREA URNS LPF: PRESENT /LPF
NEUTROPHILS # BLD AUTO: 7 10E3/UL (ref 1.6–8.3)
NEUTROPHILS NFR BLD AUTO: 82 %
NITRATE UR QL: NEGATIVE
NRBC # BLD AUTO: 0 10E3/UL
NRBC BLD AUTO-RTO: 0 /100
NT-PROBNP SERPL-MCNC: 1482 PG/ML (ref 0–1800)
PH UR STRIP: 8 [PH] (ref 5–7)
PLATELET # BLD AUTO: 141 10E3/UL (ref 150–450)
POTASSIUM SERPL-SCNC: 4.3 MMOL/L (ref 3.4–5.3)
RBC # BLD AUTO: 4.13 10E6/UL (ref 4.4–5.9)
RBC URINE: <1 /HPF
RSV RNA SPEC NAA+PROBE: NEGATIVE
SARS-COV-2 RNA RESP QL NAA+PROBE: POSITIVE
SODIUM SERPL-SCNC: 138 MMOL/L (ref 135–145)
SP GR UR STRIP: 1.03 (ref 1–1.03)
SQUAMOUS EPITHELIAL: <1 /HPF
TROPONIN T SERPL HS-MCNC: 29 NG/L
TROPONIN T SERPL HS-MCNC: 31 NG/L
UROBILINOGEN UR STRIP-MCNC: 2 MG/DL
WBC # BLD AUTO: 8.5 10E3/UL (ref 4–11)
WBC URINE: 1 /HPF

## 2024-05-08 PROCEDURE — 85025 COMPLETE CBC W/AUTO DIFF WBC: CPT | Performed by: EMERGENCY MEDICINE

## 2024-05-08 PROCEDURE — 81001 URINALYSIS AUTO W/SCOPE: CPT | Performed by: EMERGENCY MEDICINE

## 2024-05-08 PROCEDURE — 36415 COLL VENOUS BLD VENIPUNCTURE: CPT | Performed by: EMERGENCY MEDICINE

## 2024-05-08 PROCEDURE — 99285 EMERGENCY DEPT VISIT HI MDM: CPT | Mod: 25

## 2024-05-08 PROCEDURE — 83935 ASSAY OF URINE OSMOLALITY: CPT | Performed by: STUDENT IN AN ORGANIZED HEALTH CARE EDUCATION/TRAINING PROGRAM

## 2024-05-08 PROCEDURE — 70450 CT HEAD/BRAIN W/O DYE: CPT

## 2024-05-08 PROCEDURE — 99207 PR APP CREDIT; MD BILLING SHARED VISIT: CPT

## 2024-05-08 PROCEDURE — 250N000011 HC RX IP 250 OP 636: Performed by: EMERGENCY MEDICINE

## 2024-05-08 PROCEDURE — 87637 SARSCOV2&INF A&B&RSV AMP PRB: CPT | Performed by: EMERGENCY MEDICINE

## 2024-05-08 PROCEDURE — 96374 THER/PROPH/DIAG INJ IV PUSH: CPT

## 2024-05-08 PROCEDURE — 87040 BLOOD CULTURE FOR BACTERIA: CPT | Performed by: EMERGENCY MEDICINE

## 2024-05-08 PROCEDURE — 83605 ASSAY OF LACTIC ACID: CPT | Performed by: EMERGENCY MEDICINE

## 2024-05-08 PROCEDURE — 120N000001 HC R&B MED SURG/OB

## 2024-05-08 PROCEDURE — 83880 ASSAY OF NATRIURETIC PEPTIDE: CPT | Performed by: EMERGENCY MEDICINE

## 2024-05-08 PROCEDURE — 80048 BASIC METABOLIC PNL TOTAL CA: CPT | Performed by: EMERGENCY MEDICINE

## 2024-05-08 PROCEDURE — 96361 HYDRATE IV INFUSION ADD-ON: CPT

## 2024-05-08 PROCEDURE — 71045 X-RAY EXAM CHEST 1 VIEW: CPT

## 2024-05-08 PROCEDURE — 84484 ASSAY OF TROPONIN QUANT: CPT | Performed by: EMERGENCY MEDICINE

## 2024-05-08 PROCEDURE — 99222 1ST HOSP IP/OBS MODERATE 55: CPT | Mod: FS | Performed by: HOSPITALIST

## 2024-05-08 PROCEDURE — 258N000003 HC RX IP 258 OP 636: Performed by: EMERGENCY MEDICINE

## 2024-05-08 PROCEDURE — 93005 ELECTROCARDIOGRAM TRACING: CPT | Performed by: EMERGENCY MEDICINE

## 2024-05-08 RX ORDER — DONEPEZIL HYDROCHLORIDE 5 MG/1
5 TABLET, FILM COATED ORAL DAILY
Status: DISCONTINUED | OUTPATIENT
Start: 2024-05-09 | End: 2024-05-13 | Stop reason: HOSPADM

## 2024-05-08 RX ORDER — ACETAMINOPHEN 325 MG/1
650 TABLET ORAL EVERY 4 HOURS PRN
Status: DISCONTINUED | OUTPATIENT
Start: 2024-05-08 | End: 2024-05-13 | Stop reason: HOSPADM

## 2024-05-08 RX ORDER — DONEPEZIL HYDROCHLORIDE 5 MG/1
5 TABLET, FILM COATED ORAL DAILY
COMMUNITY

## 2024-05-08 RX ORDER — ASPIRIN 81 MG/1
81 TABLET ORAL DAILY
Status: DISCONTINUED | OUTPATIENT
Start: 2024-05-09 | End: 2024-05-13 | Stop reason: HOSPADM

## 2024-05-08 RX ORDER — FINASTERIDE 5 MG/1
5 TABLET, FILM COATED ORAL EVERY MORNING
Status: DISCONTINUED | OUTPATIENT
Start: 2024-05-09 | End: 2024-05-13 | Stop reason: HOSPADM

## 2024-05-08 RX ORDER — ONDANSETRON 4 MG/1
4 TABLET, ORALLY DISINTEGRATING ORAL EVERY 6 HOURS PRN
Status: DISCONTINUED | OUTPATIENT
Start: 2024-05-08 | End: 2024-05-13 | Stop reason: HOSPADM

## 2024-05-08 RX ORDER — ACETAMINOPHEN 650 MG/1
650 SUPPOSITORY RECTAL EVERY 4 HOURS PRN
Status: DISCONTINUED | OUTPATIENT
Start: 2024-05-08 | End: 2024-05-13 | Stop reason: HOSPADM

## 2024-05-08 RX ORDER — OLANZAPINE 2.5 MG/1
2.5 TABLET, FILM COATED ORAL 2 TIMES DAILY PRN
Status: DISCONTINUED | OUTPATIENT
Start: 2024-05-08 | End: 2024-05-13 | Stop reason: HOSPADM

## 2024-05-08 RX ORDER — FINASTERIDE 5 MG/1
1 TABLET, FILM COATED ORAL EVERY MORNING
COMMUNITY
Start: 2023-05-25

## 2024-05-08 RX ORDER — FUROSEMIDE 10 MG/ML
10 INJECTION INTRAMUSCULAR; INTRAVENOUS ONCE
Status: COMPLETED | OUTPATIENT
Start: 2024-05-08 | End: 2024-05-08

## 2024-05-08 RX ORDER — ATENOLOL 50 MG/1
50 TABLET ORAL DAILY
Status: DISCONTINUED | OUTPATIENT
Start: 2024-05-09 | End: 2024-05-11

## 2024-05-08 RX ORDER — CARBIDOPA AND LEVODOPA 25; 100 MG/1; MG/1
2 TABLET ORAL 3 TIMES DAILY
Status: DISCONTINUED | OUTPATIENT
Start: 2024-05-08 | End: 2024-05-13 | Stop reason: HOSPADM

## 2024-05-08 RX ORDER — TRAZODONE HYDROCHLORIDE 50 MG/1
50 TABLET, FILM COATED ORAL AT BEDTIME
Status: DISCONTINUED | OUTPATIENT
Start: 2024-05-08 | End: 2024-05-13 | Stop reason: HOSPADM

## 2024-05-08 RX ORDER — ONDANSETRON 2 MG/ML
4 INJECTION INTRAMUSCULAR; INTRAVENOUS EVERY 6 HOURS PRN
Status: DISCONTINUED | OUTPATIENT
Start: 2024-05-08 | End: 2024-05-13 | Stop reason: HOSPADM

## 2024-05-08 RX ORDER — TAMSULOSIN HYDROCHLORIDE 0.4 MG/1
0.4 CAPSULE ORAL EVERY EVENING
COMMUNITY
Start: 2023-07-18

## 2024-05-08 RX ORDER — AMOXICILLIN 250 MG
2 CAPSULE ORAL 2 TIMES DAILY PRN
Status: DISCONTINUED | OUTPATIENT
Start: 2024-05-08 | End: 2024-05-13 | Stop reason: HOSPADM

## 2024-05-08 RX ORDER — NYSTATIN 100000 [USP'U]/G
POWDER TOPICAL 2 TIMES DAILY PRN
COMMUNITY
Start: 2023-08-08

## 2024-05-08 RX ORDER — ACETAMINOPHEN 325 MG/1
650 TABLET ORAL EVERY 6 HOURS PRN
COMMUNITY
Start: 2023-07-18

## 2024-05-08 RX ORDER — AMOXICILLIN 250 MG
1 CAPSULE ORAL 2 TIMES DAILY PRN
Status: DISCONTINUED | OUTPATIENT
Start: 2024-05-08 | End: 2024-05-13 | Stop reason: HOSPADM

## 2024-05-08 RX ORDER — CALCIUM CARBONATE 500 MG/1
1000 TABLET, CHEWABLE ORAL 4 TIMES DAILY PRN
Status: DISCONTINUED | OUTPATIENT
Start: 2024-05-08 | End: 2024-05-13 | Stop reason: HOSPADM

## 2024-05-08 RX ORDER — OLANZAPINE 10 MG/2ML
2.5 INJECTION, POWDER, FOR SOLUTION INTRAMUSCULAR DAILY PRN
Status: DISCONTINUED | OUTPATIENT
Start: 2024-05-08 | End: 2024-05-13 | Stop reason: HOSPADM

## 2024-05-08 RX ORDER — TAMSULOSIN HYDROCHLORIDE 0.4 MG/1
0.4 CAPSULE ORAL EVERY EVENING
Status: DISCONTINUED | OUTPATIENT
Start: 2024-05-08 | End: 2024-05-13 | Stop reason: HOSPADM

## 2024-05-08 RX ORDER — LIDOCAINE 40 MG/G
CREAM TOPICAL
Status: DISCONTINUED | OUTPATIENT
Start: 2024-05-08 | End: 2024-05-13 | Stop reason: HOSPADM

## 2024-05-08 RX ADMIN — FUROSEMIDE 10 MG: 10 INJECTION, SOLUTION INTRAMUSCULAR; INTRAVENOUS at 21:26

## 2024-05-08 RX ADMIN — SODIUM CHLORIDE 500 ML: 9 INJECTION, SOLUTION INTRAVENOUS at 18:47

## 2024-05-08 ASSESSMENT — COLUMBIA-SUICIDE SEVERITY RATING SCALE - C-SSRS
1. IN THE PAST MONTH, HAVE YOU WISHED YOU WERE DEAD OR WISHED YOU COULD GO TO SLEEP AND NOT WAKE UP?: NO
2. HAVE YOU ACTUALLY HAD ANY THOUGHTS OF KILLING YOURSELF IN THE PAST MONTH?: NO
6. HAVE YOU EVER DONE ANYTHING, STARTED TO DO ANYTHING, OR PREPARED TO DO ANYTHING TO END YOUR LIFE?: NO

## 2024-05-08 ASSESSMENT — ACTIVITIES OF DAILY LIVING (ADL)
ADLS_ACUITY_SCORE: 36
ADLS_ACUITY_SCORE: 35
ADLS_ACUITY_SCORE: 36

## 2024-05-08 NOTE — ED NOTES
Bed: [facility]  Expected date: 5/8/24  Expected time: 6:07 PM  Means of arrival:   Comments:  [facility]

## 2024-05-08 NOTE — ED TRIAGE NOTES
Pt arrives via EMS from a nursing home. Tested positive for covid today, had 2 falls, increased confusion. Frequent urination.      Triage Assessment (Adult)       Row Name 05/08/24 2394          Triage Assessment    Airway WDL WDL        Respiratory WDL    Respiratory WDL WDL        Skin Circulation/Temperature WDL    Skin Circulation/Temperature WDL WDL        Cardiac WDL    Cardiac WDL WDL        Peripheral/Neurovascular WDL    Peripheral Neurovascular WDL WDL        Cognitive/Neuro/Behavioral WDL    Cognitive/Neuro/Behavioral WDL orientation     Orientation disoriented to;time;place

## 2024-05-08 NOTE — ED PROVIDER NOTES
EMERGENCY DEPARTMENT ENCOUNTER      NAME: Al Lynn  AGE: 83 year old male  YOB: 1940  MRN: [id number]  EVALUATION DATE & TIME: 2024  6:13 PM    PCP: Joann Guzman    ED PROVIDER: Hayes Taylor M.D.      Chief Complaint   Patient presents with    Covid Concern    Fall    Generalized Weakness         FINAL IMPRESSION:  1.  Acute fall without injury.  2.  Acute general weakness.  3.  Acute CHF.  4.  Acute COVID-positive.    ED COURSE & MEDICAL DECISION MAKIN:40 PM.  I met with the patient to gather history and to perform my initial exam. We discussed plans for the ED course, including diagnostic testing and treatment. PPE worn: cloth mask.  Patient sent from a memory care unit nursing home for Parkinson and dementia.  Reportedly COVID-positive test today.  Results are not available in the computer.  They also note general weakness to the point the patient cannot stand or walk and had a fall.  No injury from the fall.  9:17 PM.  Head CT without acute no acute findings.  Chest x-ray with some question early edema.  Urinalysis with ketones but otherwise negative.  Blood culture sent.  Lactate 1.1.  Chemistries normal.  CBC unremarkable and platelets borderline at 141.  Initial troponin 29 and delta troponin 31.  BNP elevated.  COVID test positive.  Plan admit patient to cardiac telemetry inpatient.  Patient received IV fluids, confirm DNR status, and Lasix 10 mg IV.  Plan admit to cardiac telemetry inpatient.  Patient in agreement.  We have paged the admitting physician.  9:46 PM.  Hospitalist in agreement with the cardiac telemetry inpatient admission.    Pertinent Labs & Imaging studies reviewed. (See chart for details)  83 year old male presents to the Emergency Department for evaluation of fall.    At the conclusion of the encounter I discussed the results of all of the tests and the disposition. The questions were answered. The patient or family acknowledged understanding and  was agreeable with the care plan.              Medical Decision Making  Obtained supplemental history: Daughter and EMS.  Reviewed external records: Both inpatient and outpatient computer records were reviewed.    Care impacted by chronic illness: Hypertension, dementia, parkinsonism, depression  Care significantly affected by social determinants of health:Access to Medical Care  Did you consider but not order tests?: Work up considered but not performed and documented in chart, if applicable  Did you interpret images independently?: Independent interpretation of ECG and images noted in documentation, when applicable.  Consultation discussion with other provider:  Will discuss with admitting hospitalist after evaluation.  Probable admission to the hospital after evaluation.      MEDICATIONS GIVEN IN THE EMERGENCY:  Medications   sodium chloride 0.9% BOLUS 500 mL (has no administration in time range)       NEW PRESCRIPTIONS STARTED AT TODAY'S ER VISIT  New Prescriptions    No medications on file          =================================================================    HPI    Patient information was obtained from:, EMS.    Use of : N/A         Juan Carlosparis Lynn is a 83 year old male with a pertinent history of hypertension, parkinsonism with dementia and past alcoholism today who presents to this ED today for evaluation of weakness, fall without injury, and COVID-positive at the nursing home.  Weakness and unable to stand or walk unassisted and had falls.  No injury from the falls.  He does have a bump in the left forehead.      He does not identify any waxing or waning symptoms otherwise, exacerbating or alleviating features, associated symptoms except as mentioned.  The patient denies any pain related complaints.    REVIEW OF SYSTEMS   Review of Systems patient and nursing home people note fall and general weakness.  Patient with no complaints at all.  Daughter and EMS.    PAST MEDICAL HISTORY:  Past  Medical History:   Diagnosis Date    Colon polyp     Cough     Depression     Diverticulitis     Head injury     fell working in garden    Hearing loss     Hemorrhoids     HTN (hypertension)     Sinus drainage     coughing spells?       PAST SURGICAL HISTORY:  Past Surgical History:   Procedure Laterality Date    ESOPHAGOSCOPY, GASTROSCOPY, DUODENOSCOPY (EGD), COMBINED  4/21/2014    Procedure: Gastroscopy ;  Surgeon: Bradley Colmenares MD;  Location: [facility]    HERNIA REPAIR, INGUINAL RT/LT      ZZC APPENDECTOMY             CURRENT MEDICATIONS:    aspirin 81 MG EC tablet  atenolol (TENORMIN) 50 MG tablet  carbidopa-levodopa (SINEMET)  MG tablet  diphenhydrAMINE (BENADRYL) 50 MG capsule  donepezil (ARICEPT) 5 MG tablet  fish oil-omega-3 fatty acids 1000 MG capsule  LANsoprazole (PREVACID) 30 MG capsule  multivitamin, therapeutic (THERA-VIT) TABS tablet  oxybutynin ER (DITROPAN-XL) 10 MG 24 hr tablet  traZODone (DESYREL) 50 MG tablet  trospium (SANCTURA XR) 60 MG CP24 24 hr capsule  Vitamin D, Cholecalciferol, 25 MCG (1000 UT) TABS  vitamin E (TOCOPHEROL) 200 units (90 mg) capsule        ALLERGIES:  Allergies   Allergen Reactions    Nka [No Known Allergies]        FAMILY HISTORY:  Family History   Problem Relation Age of Onset    C.A.D. Father     Cancer Brother     Neurologic Disorder Brother         parkinsons       SOCIAL HISTORY:   Social History     Socioeconomic History    Marital status:    Tobacco Use    Smoking status: Former    Smokeless tobacco: Never   Substance and Sexual Activity    Alcohol use: Yes     Comment: every day    Drug use: No     Social Determinants of Health     Financial Resource Strain: Low Risk  (2/28/2023)    [facility]    Financial Resource Strain     Difficulty of Paying Living Expenses: 3   Food Insecurity: No Food Insecurity (2/28/2023)    [facility], [facility]    Food Insecurity     Worried About Running Out of Food in the Last Year: 1   Transportation Needs: No Transportation Needs (2/28/2023)    [facility], [facility]    Transportation Needs     Lack of Transportation (Medical): 1    Received from [facility]    Social Connections   Housing Stability: Low Risk  (2/28/2023)    Received from [facility], [facility]    Housing Stability     Unable to Pay for Housing in the Last Year: 1     Former smoker.  No current drugs or tobacco.  History of alcohol use and alcoholism.  VITALS:  BP (!) 150/70   Pulse 73   Temp 99.6  F (37.6  C) (Oral)   Resp 20   SpO2 92%     PHYSICAL EXAM    Vital Signs:  BP (!) 150/70   Pulse 73   Temp 99.6  F (37.6  C) (Oral)   Resp 20   SpO2 92%   General:  On entering the room he is in no apparent distress.    Neck:  Neck supple with full range of motion and nontender.    Back:  Back and spine are nontender.  No costovertebral angle tenderness.    HEENT:  Oropharynx clear with moist mucous membranes.  HEENT unremarkable.    Pulmonary:  Chest clear to auscultation without rhonchi rales or wheezing.    Cardiovascular:  Cardiac regular rate and rhythm without murmurs rubs or gallops.    Abdomen:  Abdomen soft nontender.  There is no rebound or guarding.    Muskuloskeletal:  He moves all 4 without any difficulty and has normal neurovascular exams.  Extremities without clubbing, cyanosis, or edema.  Legs and calves are nontender.    Neuro:  He is alert and oriented ×1 cranial nerves II through XII intact negative bilaterally.  Strength and sensation appears to be intact and normal in all 4 extremities.  And moves all extremities symmetrically.    Psych:  Normal affect.    Skin:  Unremarkable and warm  and dry.       LAB:  All pertinent labs reviewed and interpreted.  Labs Ordered and Resulted from Time of ED Arrival to Time of ED Departure   ROUTINE UA WITH MICROSCOPIC REFLEX TO CULTURE - Abnormal       Result Value    Color Urine Yellow      Appearance Urine Clear      Glucose Urine Negative      Bilirubin Urine Negative      Ketones Urine 20 (*)     Specific Gravity Urine 1.028      Blood Urine Negative      pH Urine 8.0 (*)     Protein Albumin Urine 50 (*)     Urobilinogen Urine 2.0 (*)     Nitrite Urine Negative      Leukocyte Esterase Urine Negative      Mucus Urine Present (*)     RBC Urine <1      WBC Urine 1      Squamous Epithelials Urine <1     BASIC METABOLIC PANEL - Abnormal    Sodium 138      Potassium 4.3      Chloride 105      Carbon Dioxide (CO2) 26      Anion Gap 7      Urea Nitrogen 20.8      Creatinine 1.13      GFR Estimate 64      Calcium 8.6 (*)     Glucose 114 (*)    TROPONIN T, HIGH SENSITIVITY - Abnormal    Troponin T, High Sensitivity 29 (*)    INFLUENZA A/B, RSV, & SARS-COV2 PCR - Abnormal    Influenza A PCR Negative      Influenza B PCR Negative      RSV PCR Negative      SARS CoV2 PCR Positive (*)    CBC WITH PLATELETS AND DIFFERENTIAL - Abnormal    WBC Count 8.5      RBC Count 4.13 (*)     Hemoglobin 13.3      Hematocrit 39.7 (*)     MCV 96      MCH 32.2      MCHC 33.5      RDW 13.1      Platelet Count 141 (*)     % Neutrophils 82      % Lymphocytes 7      % Monocytes 11      % Eosinophils 0      % Basophils 0      % Immature Granulocytes 0      NRBCs per 100 WBC 0      Absolute Neutrophils 7.0      Absolute Lymphocytes 0.6 (*)     Absolute Monocytes 0.9      Absolute Eosinophils 0.0      Absolute Basophils 0.0      Absolute Immature Granulocytes 0.0      Absolute NRBCs 0.0     TROPONIN T, HIGH SENSITIVITY - Abnormal    Troponin T, High Sensitivity 31 (*)    LACTIC ACID WHOLE BLOOD - Normal    Lactic Acid 1.1     NT PROBNP INPATIENT - Normal    N terminal Pro BNP Inpatient 1,482      BLOOD CULTURE   BLOOD CULTURE       RADIOLOGY:  Reviewed all pertinent imaging. Please see official radiology report.  Head CT w/o contrast   Final Result   IMPRESSION:   1.  No CT evidence for acute intracranial process.   2.  Brain atrophy and presumed chronic microvascular ischemic changes as above.      XR Chest 1 View   Final Result   IMPRESSION: Heart size is likely stable. Lung volumes are diminished. There is increasing prominence of central pulmonary vasculature and increased interstitial density. This may be hypoventilatory versus early interstitial edema. No large effusions or    pneumothorax. No acute bony abnormality.                 EKG:    Normal sinus rhythm at 83, left anterior fascicular block, borderline LVH pattern, no acute findings.  Similar to previous EKG of Rosenda 15, 2022.    I have independently reviewed and interpreted the EKG(s) documented above.      Hayes Taylor M.D.  Emergency Medicine  [facility] EMERGENCY ROOM  [address] [id number] [phone]  Dept: [phone]       Hayes Taylor MD  05/08/24 2118       Hayes Taylor MD  05/08/24 3076

## 2024-05-09 ENCOUNTER — APPOINTMENT (OUTPATIENT)
Dept: OCCUPATIONAL THERAPY | Facility: CLINIC | Age: 84
DRG: 177 | End: 2024-05-09
Payer: COMMERCIAL

## 2024-05-09 ENCOUNTER — APPOINTMENT (OUTPATIENT)
Dept: PHYSICAL THERAPY | Facility: CLINIC | Age: 84
DRG: 177 | End: 2024-05-09
Payer: COMMERCIAL

## 2024-05-09 LAB
ANION GAP SERPL CALCULATED.3IONS-SCNC: 11 MMOL/L (ref 7–15)
ATRIAL RATE - MUSE: 81 BPM
BASE EXCESS BLDV CALC-SCNC: 0.9 MMOL/L (ref -3–3)
BUN SERPL-MCNC: 17.6 MG/DL (ref 8–23)
CALCIUM SERPL-MCNC: 8.6 MG/DL (ref 8.8–10.2)
CHLORIDE SERPL-SCNC: 102 MMOL/L (ref 98–107)
CREAT SERPL-MCNC: 1.04 MG/DL (ref 0.67–1.17)
DEPRECATED HCO3 PLAS-SCNC: 25 MMOL/L (ref 22–29)
DIASTOLIC BLOOD PRESSURE - MUSE: 70 MMHG
EGFRCR SERPLBLD CKD-EPI 2021: 71 ML/MIN/1.73M2
ERYTHROCYTE [DISTWIDTH] IN BLOOD BY AUTOMATED COUNT: 13 % (ref 10–15)
GLUCOSE SERPL-MCNC: 103 MG/DL (ref 70–99)
HCO3 BLDV-SCNC: 25 MMOL/L (ref 21–28)
HCT VFR BLD AUTO: 39.4 % (ref 40–53)
HGB BLD-MCNC: 13.3 G/DL (ref 13.3–17.7)
INTERPRETATION ECG - MUSE: NORMAL
LACTATE SERPL-SCNC: 0.9 MMOL/L (ref 0.7–2)
MCH RBC QN AUTO: 32.5 PG (ref 26.5–33)
MCHC RBC AUTO-ENTMCNC: 33.8 G/DL (ref 31.5–36.5)
MCV RBC AUTO: 96 FL (ref 78–100)
O2/TOTAL GAS SETTING VFR VENT: 21 %
OSMOLALITY SERPL: 286 MMOL/KG (ref 280–301)
OXYHGB MFR BLDV: 88 % (ref 70–75)
P AXIS - MUSE: 22 DEGREES
PCO2 BLDV: 36 MM HG (ref 40–50)
PH BLDV: 7.45 [PH] (ref 7.32–7.43)
PLATELET # BLD AUTO: 126 10E3/UL (ref 150–450)
PO2 BLDV: 52 MM HG (ref 25–47)
POTASSIUM SERPL-SCNC: 4 MMOL/L (ref 3.4–5.3)
PR INTERVAL - MUSE: 158 MS
PROCALCITONIN SERPL IA-MCNC: 0.07 NG/ML
QRS DURATION - MUSE: 114 MS
QT - MUSE: 376 MS
QTC - MUSE: 436 MS
R AXIS - MUSE: -62 DEGREES
RBC # BLD AUTO: 4.09 10E6/UL (ref 4.4–5.9)
SAO2 % BLDV: 89.5 % (ref 70–75)
SODIUM SERPL-SCNC: 138 MMOL/L (ref 135–145)
SYSTOLIC BLOOD PRESSURE - MUSE: 150 MMHG
T AXIS - MUSE: 25 DEGREES
VENTRICULAR RATE- MUSE: 81 BPM
WBC # BLD AUTO: 6.7 10E3/UL (ref 4–11)

## 2024-05-09 PROCEDURE — 250N000011 HC RX IP 250 OP 636: Performed by: STUDENT IN AN ORGANIZED HEALTH CARE EDUCATION/TRAINING PROGRAM

## 2024-05-09 PROCEDURE — 36415 COLL VENOUS BLD VENIPUNCTURE: CPT | Performed by: STUDENT IN AN ORGANIZED HEALTH CARE EDUCATION/TRAINING PROGRAM

## 2024-05-09 PROCEDURE — 97535 SELF CARE MNGMENT TRAINING: CPT | Mod: GO

## 2024-05-09 PROCEDURE — 250N000013 HC RX MED GY IP 250 OP 250 PS 637: Performed by: HOSPITALIST

## 2024-05-09 PROCEDURE — G0378 HOSPITAL OBSERVATION PER HR: HCPCS

## 2024-05-09 PROCEDURE — 96372 THER/PROPH/DIAG INJ SC/IM: CPT | Performed by: STUDENT IN AN ORGANIZED HEALTH CARE EDUCATION/TRAINING PROGRAM

## 2024-05-09 PROCEDURE — 85027 COMPLETE CBC AUTOMATED: CPT

## 2024-05-09 PROCEDURE — 97165 OT EVAL LOW COMPLEX 30 MIN: CPT | Mod: GO

## 2024-05-09 PROCEDURE — 97162 PT EVAL MOD COMPLEX 30 MIN: CPT | Mod: GP

## 2024-05-09 PROCEDURE — 250N000013 HC RX MED GY IP 250 OP 250 PS 637

## 2024-05-09 PROCEDURE — 97530 THERAPEUTIC ACTIVITIES: CPT | Mod: GP

## 2024-05-09 PROCEDURE — 120N000001 HC R&B MED SURG/OB

## 2024-05-09 PROCEDURE — 97116 GAIT TRAINING THERAPY: CPT | Mod: GP

## 2024-05-09 PROCEDURE — 258N000003 HC RX IP 258 OP 636: Performed by: STUDENT IN AN ORGANIZED HEALTH CARE EDUCATION/TRAINING PROGRAM

## 2024-05-09 PROCEDURE — 99233 SBSQ HOSP IP/OBS HIGH 50: CPT | Performed by: STUDENT IN AN ORGANIZED HEALTH CARE EDUCATION/TRAINING PROGRAM

## 2024-05-09 PROCEDURE — 36415 COLL VENOUS BLD VENIPUNCTURE: CPT

## 2024-05-09 PROCEDURE — 83930 ASSAY OF BLOOD OSMOLALITY: CPT | Performed by: STUDENT IN AN ORGANIZED HEALTH CARE EDUCATION/TRAINING PROGRAM

## 2024-05-09 PROCEDURE — 82805 BLOOD GASES W/O2 SATURATION: CPT | Performed by: STUDENT IN AN ORGANIZED HEALTH CARE EDUCATION/TRAINING PROGRAM

## 2024-05-09 PROCEDURE — 84145 PROCALCITONIN (PCT): CPT | Performed by: STUDENT IN AN ORGANIZED HEALTH CARE EDUCATION/TRAINING PROGRAM

## 2024-05-09 PROCEDURE — 80048 BASIC METABOLIC PNL TOTAL CA: CPT

## 2024-05-09 PROCEDURE — 83605 ASSAY OF LACTIC ACID: CPT | Performed by: STUDENT IN AN ORGANIZED HEALTH CARE EDUCATION/TRAINING PROGRAM

## 2024-05-09 RX ORDER — SODIUM CHLORIDE, SODIUM LACTATE, POTASSIUM CHLORIDE, CALCIUM CHLORIDE 600; 310; 30; 20 MG/100ML; MG/100ML; MG/100ML; MG/100ML
INJECTION, SOLUTION INTRAVENOUS CONTINUOUS
Status: DISCONTINUED | OUTPATIENT
Start: 2024-05-09 | End: 2024-05-11

## 2024-05-09 RX ORDER — ENOXAPARIN SODIUM 100 MG/ML
40 INJECTION SUBCUTANEOUS EVERY 24 HOURS
Status: DISCONTINUED | OUTPATIENT
Start: 2024-05-09 | End: 2024-05-13 | Stop reason: HOSPADM

## 2024-05-09 RX ADMIN — OLANZAPINE 2.5 MG: 2.5 TABLET, FILM COATED ORAL at 16:52

## 2024-05-09 RX ADMIN — TAMSULOSIN HYDROCHLORIDE 0.4 MG: 0.4 CAPSULE ORAL at 21:00

## 2024-05-09 RX ADMIN — DONEPEZIL HYDROCHLORIDE 5 MG: 5 TABLET, FILM COATED ORAL at 08:03

## 2024-05-09 RX ADMIN — NIRMATRELVIR AND RITONAVIR 2 TABLET: KIT at 21:00

## 2024-05-09 RX ADMIN — FINASTERIDE 5 MG: 5 TABLET, FILM COATED ORAL at 08:19

## 2024-05-09 RX ADMIN — ENOXAPARIN SODIUM 40 MG: 100 INJECTION SUBCUTANEOUS at 17:51

## 2024-05-09 RX ADMIN — CARBIDOPA AND LEVODOPA 2 TABLET: 25; 100 TABLET ORAL at 00:11

## 2024-05-09 RX ADMIN — CARBIDOPA AND LEVODOPA 2 TABLET: 25; 100 TABLET ORAL at 18:03

## 2024-05-09 RX ADMIN — TRAZODONE HYDROCHLORIDE 50 MG: 50 TABLET ORAL at 21:00

## 2024-05-09 RX ADMIN — NIRMATRELVIR AND RITONAVIR 2 TABLET: KIT at 08:04

## 2024-05-09 RX ADMIN — ASPIRIN 81 MG: 81 TABLET, COATED ORAL at 08:01

## 2024-05-09 RX ADMIN — TRAZODONE HYDROCHLORIDE 50 MG: 50 TABLET ORAL at 00:11

## 2024-05-09 RX ADMIN — ATENOLOL 50 MG: 50 TABLET ORAL at 08:16

## 2024-05-09 RX ADMIN — CARBIDOPA AND LEVODOPA 2 TABLET: 25; 100 TABLET ORAL at 12:43

## 2024-05-09 RX ADMIN — SODIUM CHLORIDE, POTASSIUM CHLORIDE, SODIUM LACTATE AND CALCIUM CHLORIDE: 600; 310; 30; 20 INJECTION, SOLUTION INTRAVENOUS at 17:51

## 2024-05-09 RX ADMIN — CARBIDOPA AND LEVODOPA 2 TABLET: 25; 100 TABLET ORAL at 07:33

## 2024-05-09 RX ADMIN — TAMSULOSIN HYDROCHLORIDE 0.4 MG: 0.4 CAPSULE ORAL at 00:11

## 2024-05-09 ASSESSMENT — ACTIVITIES OF DAILY LIVING (ADL)
ADLS_ACUITY_SCORE: 51
ADLS_ACUITY_SCORE: 39
ADLS_ACUITY_SCORE: 54
ADLS_ACUITY_SCORE: 54
ADLS_ACUITY_SCORE: 39
ADLS_ACUITY_SCORE: 39
ADLS_ACUITY_SCORE: 36
ADLS_ACUITY_SCORE: 39
ADLS_ACUITY_SCORE: 36
ADLS_ACUITY_SCORE: 39
ADLS_ACUITY_SCORE: 39
ADLS_ACUITY_SCORE: 56
ADLS_ACUITY_SCORE: 36
ADLS_ACUITY_SCORE: 39
ADLS_ACUITY_SCORE: 36
ADLS_ACUITY_SCORE: 54
ADLS_ACUITY_SCORE: 56

## 2024-05-09 NOTE — PROGRESS NOTES
05/09/24 1500   Appointment Info   Signing Clinician's Name / Credentials (OT) Wilmer Willis OTR/L   Quick Adds   Quick Adds Certification   Living Environment   People in Home facility resident   Current Living Arrangements other (see comments)  (memory care)   Living Environment Comments Pt has FWW but doesn't typically use it at baseline, pt has walkin shower w/ shower chair and grab bars, standard toilet w/ grab bar next to it.   Self-Care   Usual Activity Tolerance moderate   Current Activity Tolerance fair   Equipment Currently Used at Home grab bar, toilet;grab bar, tub/shower;shower chair   Activity/Exercise/Self-Care Comment Pt typically IND w/ basic ADLs and gets assistance w /all IADLs at baseline. Pt does have option for more services if needed at facility   General Information   Onset of Illness/Injury or Date of Surgery 05/08/24   Referring Physician Tristan Kwong MD   Patient/Family Therapy Goal Statement (OT) get stronger and go home tomorrow   Additional Occupational Profile Info/Pertinent History of Current Problem Mr. Al Lynn is a 83 year old man with past medical history significant for Parkinson disease, hypertension, BPH, possible dementia who presented to the hospital on 5/8/2024 with generalized weakness. He was found to have COVID-19 infection, possible metabolic encephalopathy.   Existing Precautions/Restrictions fall   Limitations/Impairments hearing   Cognitive Status Examination   Orientation Status orientation to person, place and time   Affect/Mental Status (Cognitive) confused   Follows Commands follows one-step commands;75-90% accuracy   Sensory   Sensory Quick Adds sensation intact   Pain Assessment   Patient Currently in Pain No   Posture   Posture not impaired   Range of Motion Comprehensive   Comment, General Range of Motion ~90 degree of bilateral shoulder flexion   Strength Comprehensive (MMT)   Comment, General Manual Muscle Testing (MMT) Assessment  generalized weakness   Bed Mobility   Comment (Bed Mobility) pt sleeps in recliner at baseline   Transfers   Transfers bed-chair transfer;sit-stand transfer;toilet transfer;shower transfer   Transfer Comments CGA-Min A   Activities of Daily Living   BADL Assessment/Intervention lower body dressing;bathing;toileting   Bathing Assessment/Intervention   [place] Level (Bathing) minimum assist (75% patient effort)   Lower Body Dressing Assessment/Training   [place] Level (Lower Body Dressing) minimum assist (75% patient effort)   Toileting   [place] Level (Toileting) minimum assist (75% patient effort)   Clinical Impression   Criteria for Skilled Therapeutic Interventions Met (OT) Yes, treatment indicated   OT Diagnosis decreased ADLs   Influenced by the following impairments covid, weakness, PD, Dementia   OT Problem List-Impairments impacting ADL activity tolerance impaired;mobility;strength   Assessment of Occupational Performance 1-3 Performance Deficits   Identified Performance Deficits dressing, toileting, bathing, all transfers   Planned Therapy Interventions (OT) ADL retraining;transfer training;strengthening   Clinical Decision Making Complexity (OT) problem focused assessment/low complexity   Risk & Benefits of therapy have been explained evaluation/treatment results reviewed;patient;daughter   OT Total Evaluation Time   OT Eval, Low Complexity Minutes (26629) 10   Therapy Certification   Medical Diagnosis covid, weakness   Start of Care Date 05/09/24   Certification date from 05/09/24   Certification date to 06/08/24   OT Goals   Therapy Frequency (OT) 5 times/week   OT Predicted Duration/Target Date for Goal Attainment 05/15/24   OT Goals Lower Body Dressing;Toilet Transfer/Toileting;Transfers   OT: Lower Body Dressing Modified independent   OT: Transfer Supervision/stand-by assist;with assistive device  (walkin shower)   OT: Toilet Transfer/Toileting Modified independent;toilet  transfer;cleaning and garment management   Interventions   Interventions Quick Adds Self-Care/Home Management   Self-Care/Home Management   Self-Care/Home Mgmt/ADL, Compensatory, Meal Prep Minutes (54655) 12   Symptoms Noted During/After Treatment (Meal Preparation/Planning Training) fatigue   Treatment Detail/Skilled Intervention Pt seen in ED - sitting in recliner upon arrival w/ daughter present for session. Pt cued for safe STS - needing Mod A hand over hand to guide hands to push up from arms of chair - completed STS w/ Min A. Pt amb. forward/backward 6 ft w/ FWW and CGA, bilateral tremors noted. Limited by space due to covid precautions and small ED room. Pt takes small shuffle steps and reported bilateral LEs feel weak. Pt transferred back into recliner w/ Mod A to guide hands back towards chair. Pt cued for LE dressing techniques - pt doffed R sock w/ SBA and needing Min A for L sock. Mod A to don both socks. Pt ended session i nrecliner w/ all needs w/in reach.   OT Discharge Planning   OT Plan LE dressing, toilet/WIS transfer, standing tolerance, UE ex   OT Discharge Recommendation (DC Rec) (S)  home with home care occupational therapy;Transitional Care Facility   OT Rationale for DC Rec Pt having some weakness and unsteadiness on feet. Pt lives in memory care w/ wife and will likely be able to return w/ assistance for ADLs and IADLs. Will reassess tomorrow. If pt unable to get assistance w/ all ADLs at memory care, will likely need short bout at TCU. Pt has all necessary DME.   OT Brief overview of current status Min A LE dressing/transfers   Total Session Time   Timed Code Treatment Minutes 12   Total Session Time (sum of timed and untimed services) 22      [facility]  OUTPATIENT OCCUPATIONAL THERAPY  EVALUATION  PLAN OF TREATMENT FOR OUTPATIENT REHABILITATION  (COMPLETE FOR INITIAL CLAIMS ONLY)  Patient's Last Name, First Name, M.I.  Date of Birth:   1940  Al Lynn                          Provider's Name  [facility] Medical Record No.  [id number]                             Onset Date:  05/08/24   Start of Care Date:  05/09/24   Type:     ___PT   _X_OT   ___SLP Medical Diagnosis:  covid, weakness                    OT Diagnosis:  decreased ADLs Visits from SOC:  1     See note for plan of treatment, functional goals and certification details    I CERTIFY THE NEED FOR THESE SERVICES FURNISHED UNDER        THIS PLAN OF TREATMENT AND WHILE UNDER MY CARE     (Physician co-signature of this document indicates review and certification of the therapy plan).

## 2024-05-09 NOTE — ED NOTES
Family member states pt appears more confused throughout the day. Appears a bit restless. VS WNL. Afebrile. Dr Kwong Paged as family requesting update.

## 2024-05-09 NOTE — PLAN OF CARE
Goal Outcome Evaluation:    Pt reports back discomfort, wants to ambulate in hallway and declines pain medication.  Pt sitting up in bed with legs elevated to help with back discomfort.   Alert to self and location only.   Male external purewick in place.  Lung sounds diminished.  On room air with sats in the mid 90's.        Problem: Adult Inpatient Plan of Care  Goal: Plan of Care Review  Description: The Plan of Care Review/Shift note should be completed every shift.  The Outcome Evaluation is a brief statement about your assessment that the patient is improving, declining, or no change.  This information will be displayed automatically on your shift  note.  Outcome: Progressing     Problem: Fatigue  Goal: Improved Activity Tolerance  Outcome: Progressing    Problem: Cognitive Impairment  Goal: Optimal Cognitive Function  Outcome: Progressing

## 2024-05-09 NOTE — PROGRESS NOTES
[facility]  OUTPATIENT PHYSICAL THERAPY EVALUATION  PLAN OF TREATMENT FOR OUTPATIENT REHABILITATION  (COMPLETE FOR INITIAL CLAIMS ONLY)  Patient's Last Name, First Name, M.I.  YOB: 1940  Al Lynn                        Provider's Name  [facility] Medical Record No.  [id number]                             Onset Date:  05/09/24   Start of Care Date:  05/09/24   Type:     _X_PT   ___OT   ___SLP Medical Diagnosis:  General weakness; COVID+              PT Diagnosis:  impaired functional mobility Visits from SOC:  1     See note for plan of treatment, functional goals and certification details    I CERTIFY THE NEED FOR THESE SERVICES FURNISHED UNDER        THIS PLAN OF TREATMENT AND WHILE UNDER MY CARE     (Physician co-signature of this document indicates review and certification of the therapy plan).                 05/09/24 1400   Appointment Info   Signing Clinician's Name / Credentials (PT) Jannet Church, PT, DPT   Quick Adds   Quick Adds Certification   Living Environment   People in Home facility resident   Current Living Arrangements other (see comments)  (memory care)   Home Accessibility no concerns   Self-Care   Equipment Currently Used at Home none   Activity/Exercise/Self-Care Comment Pt owns a FWW, but does not use it   General Information   Onset of Illness/Injury or Date of Surgery 05/09/24   Referring Physician Tristan Kwong MD   Patient/Family Therapy Goals Statement (PT) to get better   Pertinent History of Current Problem (include personal factors and/or comorbidities that impact the POC) General weakness   Existing Precautions/Restrictions no known precautions/restrictions   Weight-Bearing Status - LLE full weight-bearing   Weight-Bearing Status - RLE full weight-bearing   Transfers   Transfers sit-stand transfer   Comment, (Transfers) Min assist x 1 with FWW for sit<>Stand transfer. Verbal cues for safety and  safe hand placement.   Sit-Stand Transfer   Sit-Stand [place] (Transfers) verbal cues;minimum assist (75% patient effort);1 person assist   Assistive Device (Sit-Stand Transfers) walker, front-wheeled   Comment, (Sit-Stand Transfer) Min assist x 1 with FWW for sit<>Stand transfers. Verbal cues for safety and safe hand placement.   Gait/Stairs (Locomotion)   [place] Level (Gait) verbal cues;contact guard   Assistive Device (Gait) walker, front-wheeled   Distance in Feet (Gait) 10'   Pattern (Gait) step-to   Deviations/Abnormal Patterns (Gait) base of support, narrow;glenn decreased;festinating/shuffling;gait speed decreased   Comment, (Gait/Stairs) Pt ambulates with CGA and FWW. Verbal cues for safety and posture. Cues for big steps in room.   Clinical Impression   Criteria for Skilled Therapeutic Intervention Yes, treatment indicated   PT Diagnosis (PT) impaired functional mobility   Influenced by the following impairments weakness, pain   Functional limitations due to impairments transfers, ambulation   Clinical Presentation (PT Evaluation Complexity) stable   Clinical Presentation Rationale Pt presents as medically diagnosed   Clinical Decision Making (Complexity) moderate complexity   Planned Therapy Interventions (PT) balance training;bed mobility training;gait training;home exercise program;patient/family education;ROM (range of motion);stair training;strengthening;stretching;transfer training   Risk & Benefits of therapy have been explained evaluation/treatment results reviewed;care plan/treatment goals reviewed;patient;daughter   PT Total Evaluation Time   PT Eval, Moderate Complexity Minutes (68543) 10   Therapy Certification   Start of care date 05/09/24   Certification date from 05/09/24   Certification date to 06/07/24   Medical Diagnosis General weakness; COVID+   Physical Therapy Goals   PT Frequency 5x/week   PT Predicted Duration/Target Date for Goal Attainment 05/19/24   PT Goals  Transfers;Gait   PT: Transfers Independent;Sit to/from stand   PT: Gait Independent;50 feet   Interventions   Interventions Quick Adds Gait Training;Therapeutic Activity   Therapeutic Activity   Therapeutic Activities: dynamic activities to improve functional performance Minutes (89628) 10   Treatment Detail/Skilled Intervention Pt sitting up in chair. Daughter present in room throughout session. Min assist x 1 with FWW for sit<>stand transfers. Verbal cues for safety and safe hand placement. Tactile cues for safe hand placement. Pt requires seated rest break due to fatigue. Pt sitting up in chair at end of session with chair alarm on and call light within reach.   Gait Training   Gait Training Minutes (74972) 15   Treatment Detail/Skilled Intervention Pt ambulates with CGA and FWW. Verbal cues for safety and posture. Cues for big steps and safe turns. Pt able to kick legs in standing and 'dance' in standing with CGA and FWW.   Distance in Feet 30', 30'   Falls Level (Gait Training) contact guard   Physical Assistance Level (Gait Training) verbal cues;1 person assist   Weight Bearing (Gait Training) full weight-bearing   Assistive Device (Gait Training) rolling walker   Pattern Analysis (Gait Training) swing-to gait   Gait Analysis Deviations decreased glenn;decreased step length;decreased swing-to-stance ratio;decreased toe-to-floor clearance;increased time in double stance   Impairments (Gait Analysis/Training) balance impaired;strength decreased   PT Discharge Planning   PT Plan progress transfers and ambulation, therex, balance   PT Discharge Recommendation (DC Rec) (S)  home with assist;home with home care physical therapy   PT Rationale for DC Rec Pt lives in memory care facility. 24/7 assist for safety. Pt would benefit from continued PT to improve functional mobility   PT Brief overview of current status Min assist x 1 with FWW for sit<>stand transfers, CGA with FWW for 30 feet   Total Session Time    Timed Code Treatment Minutes 25   Total Session Time (sum of timed and untimed services) 35     Jannet Church, PT, DPT

## 2024-05-09 NOTE — PROGRESS NOTES
[facility]    Medicine Progress Note - Hospitalist Service    Date of Admission:  5/8/2024    Assessment & Plan   Mr. Al Lynn is a 83 year old man with past medical history significant for Parkinson disease, hypertension, BPH, possible dementia who presented to the hospital on 5/8/2024 with generalized weakness. He was found to have COVID-19 infection, possible metabolic encephalopathy.    COVID-19 infection  Possible COVID-19 pneumonia  -Respiratory status appears to be stable, currently saturating around 95% on room air.    Plan  -Continue paxlovid  -Continue to monitor respiratory status  -Check procalcitonin    Generalized weakness  -Most likely due to COVID-19 infection.    Plan  -Appreciate PT OT recommendations    Elevated troponin  Questionable pulmonary edema  -No history of heart failure  -Mildly elevated proBNP on presentation, however, this is nonspecific.  -No clear signs of fluid overload on physical examination.    Plan  -Agree with holding diuretic.  -Encourage p.o. intake  -If p.o. intake is very limited, can start LR at 75 cc/h  -Echocardiogram     Altered mental status  -Patient appears to be confused, alert to self only.  -No focal deficits on physical examination.  -Intermittently having difficulties with following commands.  -CT head on presentation negative for any acute abnormalities.  -Intermittent altered mental status most likely due to metabolic encephalopathy due to COVID-19 infection.    Plan  -Check VBG  -Continue to monitor mental status, if worsening-> MRI brain    Hypertension    Plan  -Continue home atenolol    Benign prostatic hyperplasia    Plan  -Continue home finasteride and tamsulosin     Parkinson's disease    Plan  -Continue home carbidopa-levodopa     Dementia  -Continue home Aricept          Diet: Combination Diet Regular Diet Adult    DVT Prophylaxis: Enoxaparin (Lovenox) SQ  Felipe Catheter: Not present  Lines: None     Cardiac  "Monitoring: None  Code Status: No CPR- Do NOT Intubate      Clinically Significant Risk Factors Present on Admission                # Drug Induced Platelet Defect: home medication list includes an antiplatelet medication   # Hypertension: Noted on problem list      # Overweight: Estimated body mass index is 28.94 kg/m  as calculated from the following:    Height as of this encounter: 1.753 m (5' 9\").    Weight as of this encounter: 88.9 kg (196 lb).              Disposition Plan     Medically Ready for Discharge: Anticipated Tomorrow             ARIEL PAYTON MD  Hospitalist Service  [facility]  Securely message with [employer] (more info)  Text page via [facility] Paging/Directory   ______________________________________________________________________    Interval History   Per family, patient was alert and oriented last night.  He denies any significant shortness of breath.  He feels generally weak.  He denies any nausea or vomiting.  He reported some intermittent cough.     Physical Exam   Vital Signs: Temp: 98.7  F (37.1  C) Temp src: Oral BP: 125/59 Pulse: 67   Resp: 24 SpO2: 94 % O2 Device: None (Room air)    Weight: 196 lbs 0 oz    Physical Exam  Constitutional:       General: He is not in acute distress.     Appearance: He is ill-appearing.   Cardiovascular:      Rate and Rhythm: Normal rate.      Heart sounds: No murmur heard.  Pulmonary:      Effort: Pulmonary effort is normal. No respiratory distress.      Breath sounds: No wheezing.   Neurological:      Mental Status: He is alert.      Motor: Tremor present.      Comments: Oriented to self only.  No focal weakness in bilateral upper or lower extremities.  Cranial nerve II to XII grossly intact.   Psychiatric:         Attention and Perception: He is inattentive.         Behavior: Behavior is slowed. Behavior is not agitated or aggressive.          Medical Decision Making       55 MINUTES SPENT BY ME on the date of service doing chart " review, history, exam, documentation & further activities per the note.      Data     I have personally reviewed the following data over the past 24 hrs:    6.7  \   13.3   / 126 (L)     138 102 17.6 /  103 (H)   4.0 25 1.04 \     Trop: 31 (H) BNP: 1,482     Procal: N/A CRP: N/A Lactic Acid: 1.1         Imaging results reviewed over the past 24 hrs:   Recent Results (from the past 24 hour(s))   XR Chest 1 View    Narrative    EXAM: XR CHEST 1 VIEW  LOCATION: [facility]  DATE: 5/8/2024    INDICATION: weakness  COMPARISON: 6/15/2022      Impression    IMPRESSION: Heart size is likely stable. Lung volumes are diminished. There is increasing prominence of central pulmonary vasculature and increased interstitial density. This may be hypoventilatory versus early interstitial edema. No large effusions or   pneumothorax. No acute bony abnormality.   Head CT w/o contrast    Narrative    EXAM: CT HEAD W/O CONTRAST  LOCATION: [facility]  DATE: 5/8/2024    INDICATION:  Trauma, fall  COMPARISON:  No recent comparison.  TECHNIQUE: Routine CT Head without IV contrast. Multiplanar reformats. Dose reduction techniques were used.    FINDINGS:  INTRACRANIAL CONTENTS: No intracranial hemorrhage, extraaxial collection, or mass effect.  No CT evidence of acute infarct. Mild to moderate presumed chronic small vessel ischemic changes. Mild to moderate generalized volume loss. No hydrocephalus.     VISUALIZED ORBITS/SINUSES/MASTOIDS: Prior bilateral cataract surgery. Visualized portions of the orbits are otherwise unremarkable. Mild to moderate mucosal thickening scattered about the paranasal sinuses. No middle ear or mastoid effusion.    BONES/SOFT TISSUES: No acute abnormality.      Impression    IMPRESSION:  1.  No CT evidence for acute intracranial process.  2.  Brain atrophy and presumed chronic microvascular ischemic changes as above.

## 2024-05-09 NOTE — ED NOTES
Patient very fidgety.  RN and EDTs in room multiple times.  Patient reminded where he is and that he needs to remain in bed.  Bed alarm placed and will round frequently.

## 2024-05-09 NOTE — UTILIZATION REVIEW
"Admission Status; Secondary Review Determination     Under the authority of the Utilization Management Committee, the utilization review process indicated a secondary review on Al Lynn.  The review outcome is based on review of the medical records, discussions with staff, and applying clinical experience noted on the date of the review.     (x) Observation Status Appropriate - This patient does not meet hospital inpatient criteria and is placed in observation status. If this patient's primary payer is Medicare and was admitted as an inpatient, Condition Code 44 should be used and patient status changed to \"observation\".     RATIONALE FOR DETERMINATION   83 year old male with PMHx of Parkinson's disease, dementia, hypertension, BPH who was admitted on 5/8/2024 for supportive cares for COVID-19 infection and weakness. Received IV lasix once , no hypoxia , stable VS and or oral antiviral (Paxlovid)    The severity of illness, intensity of service provided, expected LOS and risk for adverse outcome make the care appropriate for further observation; however, doesn't meet criteria for hospital inpatient admission. Dr Kwong is notified of this determination and agrees with downgrade of status.      The information on this document is developed by the utilization review team in order for the business office to ensure compliance.  This only denotes the appropriateness of proper admission status and does not reflect the quality of care rendered.         The definitions of Inpatient Status and Observation Status used in making the determination above are those provided in the CMS Coverage Manual, Chapter 1 and Chapter 6, section 70.4.      Sincerely,  Deven Marie MD  Utilization Review  Physician Advisor  [facility]  "

## 2024-05-09 NOTE — MEDICATION SCRIBE - ADMISSION MEDICATION HISTORY
Medication Scribe Admission Medication History    Admission medication history is complete. The information provided in this note is only as accurate as the sources available at the time of the update.    Information Source(s): Facility (U/NH/) medication list/MAR via in-person    Pertinent Information: Utilized MAR. Patient is from [facility].     Changes made to PTA medication list:  Added:   Finasteride 5 mg  Nystatin powder PRN  Acetaminophen 325 mg PRN  Tamsulosin 0.4 mg  Deleted:   Diphenhydramine 50 mg cap  Fish oil 1 g  Lansoprazole 30 mg  Oxybutynin 10 mg  Trospium 60 mg  Changed: None    Allergies reviewed with patient and updates made in EHR: yes    Medication History Completed By: Anibal Raymond 5/8/2024 9:56 PM    PTA Med List   Medication Sig Last Dose    acetaminophen (TYLENOL) 325 MG tablet Take 650 mg by mouth every 6 hours as needed Unknown at PRN    aspirin 81 MG EC tablet Take 81 mg by mouth daily 5/8/2024 at 0834    atenolol (TENORMIN) 50 MG tablet Take 50 mg by mouth daily 5/8/2024 at 0834    carbidopa-levodopa (SINEMET)  MG tablet Take 2 tablets by mouth 3 times daily 7 am, 1 pm, and 7pm. 5/8/2024 at 1400    donepezil (ARICEPT) 5 MG tablet Take 5 mg by mouth daily 5/8/2024 at 0834    finasteride (PROSCAR) 5 MG tablet Take 1 tablet by mouth every morning 5/8/2024 at 0834    multivitamin, therapeutic (THERA-VIT) TABS tablet Take 1 tablet by mouth daily 5/8/2024 at 0834    [facility] [id number] UNIT/GM external powder Apply topically 2 times daily as needed Unknown at PRN    tamsulosin (FLOMAX) 0.4 MG capsule Take 0.4 mg by mouth every evening 5/7/2024 at 1921    traZODone (DESYREL) 50 MG tablet Take 50 mg by mouth at bedtime 5/7/2024 at 1925    VITAMIN D (CHOLECALCIFEROL) PO Take 2,000 Units by mouth daily 5/8/2024 at 0834    vitamin E (TOCOPHEROL) 200 units (90 mg) capsule Take 200 Units by mouth daily 5/8/2024 at 0834

## 2024-05-09 NOTE — H&P
"[facility]    History and Physical - Hospitalist Service       Date of Admission:  5/8/2024    Assessment & Plan      Al Lynn is a 83 year old male with PMHx significant for Parkinson's disease, dementia, hypertension, BPH who was admitted on 5/8/2024 for supportive cares for COVID-19 infection and weakness.    ED Course: Hypertensive in ED with SBP up to 170s, tachypneic with O2 sats in low 90s on room air, temp 99.6 F. Lab workup is remarkable for initial troponin T elevated at 29, delta troponin 31. Pro-BNP 1,482. Lactate acid 1.1. Mildly low PLT at 141, otherwise BMP and CBC grossly unremarkable. UA with ketones and protein present, no blood, not suggestive of infection. Viral panel positive for COVID-19, negative for flu and RSV. CXR demonstrated \"increasing prominence of central pulmonary vasculature and increased interstitial density. This may be hypoventilatory versus early interstitial edema. No large effusions or pneumothorax.\" CT Head with no acute intracranial pathology. Given 10 mg IV Lasix x 1 dose in ED.      Possible heart failure exacerbation   No history of heart failure per chart review. Appears euvolemic clinically. Pro-BNP 1,482. CXR demonstrated \"increasing prominence of central pulmonary vasculature and increased interstitial density. This may be hypoventilatory versus early interstitial edema.\" Last echo with EF 60% (as of 10/2021). Not on PTA diuretic. Given 10 mg IV Lasix x 1 dose in ED.  - Monitor on telemetry  - Hold off on further diuresis for now, reassess tomorrow  - Daily weights  - Strict I&Os  - AM labs: CBC, BMP    COVID-19 infection  Confirmed by positive PCR test in ED. Patient has generalized weakness and some stomach upset but denies any other symptoms. Temp 99.6 F on arrival.  - Start Paxlovid  - Isolation precautions  - Tylenol PRN for fever/pain control  - Supplemental oxygen as needed to maintain O2 sats >92%  - Follow blood " cultures    Generalized weakness  Fall  No injuries sustained from fall.  - Pain control with Tylenol PRN  - PT/OT evaluation and treatment  -  consult for safe discharge planning    Elevated troponin  Initial troponin T 29, delta troponin 31. ECG in sinus rhythm, grossly unchanged from previous study. Patient denies chest pain.    Hypertension  - Resume home atenolol with hold parameters  - Monitor blood pressure    Benign prostatic hyperplasia  - Resume home finasteride and tamsulosin    Parkinson's disease  - Resume home carbidopa-levodopa    Dementia  - Resume home Aricept          Diet: Combination Diet Regular Diet Adult  DVT Prophylaxis: Pneumatic Compression Devices  Felipe Catheter: Not present  Lines: None     Cardiac Monitoring: None  Code Status: No CPR- Do NOT Intubate      Clinically Significant Risk Factors Present on Admission                # Drug Induced Platelet Defect: home medication list includes an antiplatelet medication   # Hypertension: Noted on problem list                 Disposition Plan     Medically Ready for Discharge: Anticipated in 2-4 Days         The patient's care was discussed with the Attending Physician, Dr. Bradley Roy .    Haleigh Lamas PA-C  Hospitalist Service  [facility]  Securely message with [employer] (more info)  Text page via [employer] Paging/Directory     ______________________________________________________________________    Chief Complaint   Weakness, Fall    History is obtained from the patient    History of Present Illness   Edparis Lynn is a 83 year old male with PMHx significant for Parkinson's disease, dementia, hypertension, BPH who was brought to the ED for evaluation following a fall.     Patient currently resides in [facility] in the memory care unit. He reports increased weakness and difficulty with ambulation but is unsure how long he has felt like this. He did fall today but denies injury or pain anywhere.  He states that he ambulates independently without a walker or cane at baseline. He endorses some stomach discomfort but denies nausea, vomiting, diarrhea, or constipation. Also denies chest pain, shortness of breath, cough, congestion, fever/chills, headache, dizziness, lightheadedness, new numbness/tingling.     Patient requires frequent redirection to keep Man-Wick device in place for urinating.      Past Medical History    Past Medical History:   Diagnosis Date    Colon polyp     Cough     Depression     Diverticulitis     Head injury     fell working in garden    Hearing loss     Hemorrhoids     HTN (hypertension)     Sinus drainage     coughing spells?       Past Surgical History   Past Surgical History:   Procedure Laterality Date    ESOPHAGOSCOPY, GASTROSCOPY, DUODENOSCOPY (EGD), COMBINED  4/21/2014    Procedure: Gastroscopy ;  Surgeon: Bradley Colmenares MD;  Location: [facility]    HERNIA REPAIR, INGUINAL RT/LT      [facility] APPENDECTOMY         Prior to Admission Medications   Prior to Admission Medications   Prescriptions Last Dose Informant Patient Reported? Taking?   NYAMYC [id number] UNIT/GM external powder Unknown at PRN  Yes Yes   Sig: Apply topically 2 times daily as needed   VITAMIN D (CHOLECALCIFEROL) PO 5/8/2024 at 0834  Yes Yes   Sig: Take 2,000 Units by mouth daily   acetaminophen (TYLENOL) 325 MG tablet Unknown at PRN  Yes Yes   Sig: Take 650 mg by mouth every 6 hours as needed   aspirin 81 MG EC tablet 5/8/2024 at 0834  Yes Yes   Sig: Take 81 mg by mouth daily   atenolol (TENORMIN) 50 MG tablet 5/8/2024 at 0834  Yes Yes   Sig: Take 50 mg by mouth daily   carbidopa-levodopa (SINEMET)  MG tablet 5/8/2024 at 1400  Yes Yes   Sig: Take 2 tablets by mouth 3 times daily 7 am, 1 pm, and 7pm.   donepezil (ARICEPT) 5 MG tablet 5/8/2024 at 0834  Yes Yes   Sig: Take 5 mg by mouth daily   finasteride (PROSCAR) 5 MG tablet 5/8/2024 at 0834  Yes Yes   Sig: Take 1 tablet by mouth every morning   multivitamin,  therapeutic (THERA-VIT) TABS tablet 5/8/2024 at 0834  Yes Yes   Sig: Take 1 tablet by mouth daily   tamsulosin (FLOMAX) 0.4 MG capsule 5/7/2024 at 1921  Yes Yes   Sig: Take 0.4 mg by mouth every evening   traZODone (DESYREL) 50 MG tablet 5/7/2024 at 1925  Yes Yes   Sig: Take 50 mg by mouth at bedtime   vitamin E (TOCOPHEROL) 200 units (90 mg) capsule 5/8/2024 at 0834  Yes Yes   Sig: Take 200 Units by mouth daily      Facility-Administered Medications: None        Review of Systems    The 10 point Review of Systems is negative other than noted in the HPI or here.     Social History   I have reviewed this patient's social history and updated it with pertinent information if needed.  Social History     Tobacco Use    Smoking status: Former    Smokeless tobacco: Never   Substance Use Topics    Alcohol use: Yes     Comment: every day    Drug use: No         Allergies   Allergies   Allergen Reactions    Nka [No Known Allergies]         Physical Exam   Vital Signs: Temp: 99.6  F (37.6  C) Temp src: Oral BP: (!) 171/81 Pulse: 83   Resp: (!) 56 SpO2: 91 % O2 Device: None (Room air)    Weight: 0 lbs 0 oz    General Appearance: Awake, alert, in no acute distress  Respiratory: Clear to auscultation bilaterally. Normal respiratory effort on room air.  Cardiovascular: Regular rate and rhythm, no murmur. Extremities are warm and well-perfused.  GI: Soft, non-tender, non-distended. Normal bowel sounds.  Skin: No obvious rashes or lesions on observed skin.  Musculoskeletal: Able to move all extremities freely. No lower extremity edema.   Neurologic: Disoriented to place, year, month. Baseline upper extremity tremor noted.  Psychiatric: Cooperative with exam, occasionally fidgity/wanting to get out of bed requiring redirection.    Medical Decision Making       70 MINUTES SPENT BY ME on the date of service doing chart review, history, exam, documentation & further activities per the note.      Data     I have personally reviewed the  following data over the past 24 hrs:    8.5  \   13.3   / 141 (L)     138 105 20.8 /  114 (H)   4.3 26 1.13 \     Trop: 31 (H) BNP: 1,482     Procal: N/A CRP: N/A Lactic Acid: 1.1         Imaging results reviewed over the past 24 hrs:   Recent Results (from the past 24 hour(s))   XR Chest 1 View    Narrative    EXAM: XR CHEST 1 VIEW  LOCATION: [facility]  DATE: 5/8/2024    INDICATION: weakness  COMPARISON: 6/15/2022      Impression    IMPRESSION: Heart size is likely stable. Lung volumes are diminished. There is increasing prominence of central pulmonary vasculature and increased interstitial density. This may be hypoventilatory versus early interstitial edema. No large effusions or   pneumothorax. No acute bony abnormality.   Head CT w/o contrast    Narrative    EXAM: CT HEAD W/O CONTRAST  LOCATION: [facility]  DATE: 5/8/2024    INDICATION:  Trauma, fall  COMPARISON:  No recent comparison.  TECHNIQUE: Routine CT Head without IV contrast. Multiplanar reformats. Dose reduction techniques were used.    FINDINGS:  INTRACRANIAL CONTENTS: No intracranial hemorrhage, extraaxial collection, or mass effect.  No CT evidence of acute infarct. Mild to moderate presumed chronic small vessel ischemic changes. Mild to moderate generalized volume loss. No hydrocephalus.     VISUALIZED ORBITS/SINUSES/MASTOIDS: Prior bilateral cataract surgery. Visualized portions of the orbits are otherwise unremarkable. Mild to moderate mucosal thickening scattered about the paranasal sinuses. No middle ear or mastoid effusion.    BONES/SOFT TISSUES: No acute abnormality.      Impression    IMPRESSION:  1.  No CT evidence for acute intracranial process.  2.  Brain atrophy and presumed chronic microvascular ischemic changes as above.

## 2024-05-09 NOTE — ED NOTES
Patient confused, refusing to follow command. Patient transferred to room 224. RN notified of situation that patient possibly will require 1:1 for safety.

## 2024-05-10 ENCOUNTER — APPOINTMENT (OUTPATIENT)
Dept: CARDIOLOGY | Facility: CLINIC | Age: 84
DRG: 177 | End: 2024-05-10
Attending: STUDENT IN AN ORGANIZED HEALTH CARE EDUCATION/TRAINING PROGRAM
Payer: COMMERCIAL

## 2024-05-10 LAB
ANION GAP SERPL CALCULATED.3IONS-SCNC: 7 MMOL/L (ref 7–15)
BASOPHILS # BLD AUTO: 0 10E3/UL (ref 0–0.2)
BASOPHILS NFR BLD AUTO: 0 %
BUN SERPL-MCNC: 17.8 MG/DL (ref 8–23)
CALCIUM SERPL-MCNC: 8.2 MG/DL (ref 8.8–10.2)
CHLORIDE SERPL-SCNC: 102 MMOL/L (ref 98–107)
CREAT SERPL-MCNC: 1.03 MG/DL (ref 0.67–1.17)
DEPRECATED HCO3 PLAS-SCNC: 26 MMOL/L (ref 22–29)
EGFRCR SERPLBLD CKD-EPI 2021: 72 ML/MIN/1.73M2
EOSINOPHIL # BLD AUTO: 0 10E3/UL (ref 0–0.7)
EOSINOPHIL NFR BLD AUTO: 0 %
ERYTHROCYTE [DISTWIDTH] IN BLOOD BY AUTOMATED COUNT: 12.9 % (ref 10–15)
GLUCOSE SERPL-MCNC: 114 MG/DL (ref 70–99)
HCT VFR BLD AUTO: 39 % (ref 40–53)
HGB BLD-MCNC: 13.3 G/DL (ref 13.3–17.7)
IMM GRANULOCYTES # BLD: 0 10E3/UL
IMM GRANULOCYTES NFR BLD: 0 %
LACTATE SERPL-SCNC: 1 MMOL/L (ref 0.7–2)
LYMPHOCYTES # BLD AUTO: 0.8 10E3/UL (ref 0.8–5.3)
LYMPHOCYTES NFR BLD AUTO: 12 %
MCH RBC QN AUTO: 32.4 PG (ref 26.5–33)
MCHC RBC AUTO-ENTMCNC: 34.1 G/DL (ref 31.5–36.5)
MCV RBC AUTO: 95 FL (ref 78–100)
MONOCYTES # BLD AUTO: 0.9 10E3/UL (ref 0–1.3)
MONOCYTES NFR BLD AUTO: 13 %
NEUTROPHILS # BLD AUTO: 5.3 10E3/UL (ref 1.6–8.3)
NEUTROPHILS NFR BLD AUTO: 74 %
NRBC # BLD AUTO: 0 10E3/UL
NRBC BLD AUTO-RTO: 0 /100
OSMOLALITY UR: 915 MMOL/KG (ref 100–1200)
PLATELET # BLD AUTO: 119 10E3/UL (ref 150–450)
POTASSIUM SERPL-SCNC: 3.8 MMOL/L (ref 3.4–5.3)
PROLACTIN SERPL 3RD IS-MCNC: 7 NG/ML (ref 4–15)
RBC # BLD AUTO: 4.11 10E6/UL (ref 4.4–5.9)
SODIUM SERPL-SCNC: 135 MMOL/L (ref 135–145)
WBC # BLD AUTO: 7.1 10E3/UL (ref 4–11)

## 2024-05-10 PROCEDURE — 80048 BASIC METABOLIC PNL TOTAL CA: CPT | Performed by: STUDENT IN AN ORGANIZED HEALTH CARE EDUCATION/TRAINING PROGRAM

## 2024-05-10 PROCEDURE — 99233 SBSQ HOSP IP/OBS HIGH 50: CPT | Performed by: STUDENT IN AN ORGANIZED HEALTH CARE EDUCATION/TRAINING PROGRAM

## 2024-05-10 PROCEDURE — 84146 ASSAY OF PROLACTIN: CPT | Performed by: STUDENT IN AN ORGANIZED HEALTH CARE EDUCATION/TRAINING PROGRAM

## 2024-05-10 PROCEDURE — 255N000002 HC RX 255 OP 636: Performed by: STUDENT IN AN ORGANIZED HEALTH CARE EDUCATION/TRAINING PROGRAM

## 2024-05-10 PROCEDURE — 250N000011 HC RX IP 250 OP 636: Performed by: STUDENT IN AN ORGANIZED HEALTH CARE EDUCATION/TRAINING PROGRAM

## 2024-05-10 PROCEDURE — 93005 ELECTROCARDIOGRAM TRACING: CPT | Performed by: STUDENT IN AN ORGANIZED HEALTH CARE EDUCATION/TRAINING PROGRAM

## 2024-05-10 PROCEDURE — G0378 HOSPITAL OBSERVATION PER HR: HCPCS

## 2024-05-10 PROCEDURE — C8929 TTE W OR WO FOL WCON,DOPPLER: HCPCS

## 2024-05-10 PROCEDURE — 96372 THER/PROPH/DIAG INJ SC/IM: CPT | Performed by: STUDENT IN AN ORGANIZED HEALTH CARE EDUCATION/TRAINING PROGRAM

## 2024-05-10 PROCEDURE — 258N000003 HC RX IP 258 OP 636: Performed by: STUDENT IN AN ORGANIZED HEALTH CARE EDUCATION/TRAINING PROGRAM

## 2024-05-10 PROCEDURE — 250N000013 HC RX MED GY IP 250 OP 250 PS 637: Performed by: STUDENT IN AN ORGANIZED HEALTH CARE EDUCATION/TRAINING PROGRAM

## 2024-05-10 PROCEDURE — 93306 TTE W/DOPPLER COMPLETE: CPT | Mod: 26 | Performed by: INTERNAL MEDICINE

## 2024-05-10 PROCEDURE — 120N000001 HC R&B MED SURG/OB

## 2024-05-10 PROCEDURE — 250N000013 HC RX MED GY IP 250 OP 250 PS 637

## 2024-05-10 PROCEDURE — 93005 ELECTROCARDIOGRAM TRACING: CPT

## 2024-05-10 PROCEDURE — 85041 AUTOMATED RBC COUNT: CPT | Performed by: STUDENT IN AN ORGANIZED HEALTH CARE EDUCATION/TRAINING PROGRAM

## 2024-05-10 PROCEDURE — 36415 COLL VENOUS BLD VENIPUNCTURE: CPT | Performed by: STUDENT IN AN ORGANIZED HEALTH CARE EDUCATION/TRAINING PROGRAM

## 2024-05-10 PROCEDURE — 83605 ASSAY OF LACTIC ACID: CPT | Performed by: STUDENT IN AN ORGANIZED HEALTH CARE EDUCATION/TRAINING PROGRAM

## 2024-05-10 RX ADMIN — ACETAMINOPHEN 650 MG: 325 TABLET ORAL at 03:07

## 2024-05-10 RX ADMIN — ENOXAPARIN SODIUM 40 MG: 100 INJECTION SUBCUTANEOUS at 17:34

## 2024-05-10 RX ADMIN — TAMSULOSIN HYDROCHLORIDE 0.4 MG: 0.4 CAPSULE ORAL at 20:01

## 2024-05-10 RX ADMIN — SODIUM CHLORIDE, POTASSIUM CHLORIDE, SODIUM LACTATE AND CALCIUM CHLORIDE: 600; 310; 30; 20 INJECTION, SOLUTION INTRAVENOUS at 19:19

## 2024-05-10 RX ADMIN — ATENOLOL 50 MG: 50 TABLET ORAL at 08:32

## 2024-05-10 RX ADMIN — NIRMATRELVIR AND RITONAVIR 3 TABLET: KIT at 22:03

## 2024-05-10 RX ADMIN — CARBIDOPA AND LEVODOPA 2 TABLET: 25; 100 TABLET ORAL at 13:02

## 2024-05-10 RX ADMIN — PERFLUTREN 2 ML: 6.52 INJECTION, SUSPENSION INTRAVENOUS at 09:22

## 2024-05-10 RX ADMIN — FINASTERIDE 5 MG: 5 TABLET, FILM COATED ORAL at 07:58

## 2024-05-10 RX ADMIN — DONEPEZIL HYDROCHLORIDE 5 MG: 5 TABLET, FILM COATED ORAL at 07:58

## 2024-05-10 RX ADMIN — NIRMATRELVIR AND RITONAVIR 2 TABLET: KIT at 07:58

## 2024-05-10 RX ADMIN — CARBIDOPA AND LEVODOPA 2 TABLET: 25; 100 TABLET ORAL at 06:05

## 2024-05-10 RX ADMIN — CARBIDOPA AND LEVODOPA 2 TABLET: 25; 100 TABLET ORAL at 20:01

## 2024-05-10 RX ADMIN — ASPIRIN 81 MG: 81 TABLET, COATED ORAL at 07:58

## 2024-05-10 RX ADMIN — SODIUM CHLORIDE, POTASSIUM CHLORIDE, SODIUM LACTATE AND CALCIUM CHLORIDE: 600; 310; 30; 20 INJECTION, SOLUTION INTRAVENOUS at 06:05

## 2024-05-10 ASSESSMENT — ACTIVITIES OF DAILY LIVING (ADL)
ADLS_ACUITY_SCORE: 56
ADLS_ACUITY_SCORE: 56
ADLS_ACUITY_SCORE: 58
ADLS_ACUITY_SCORE: 58
ADLS_ACUITY_SCORE: 56
ADLS_ACUITY_SCORE: 58
ADLS_ACUITY_SCORE: 62
ADLS_ACUITY_SCORE: 56
ADLS_ACUITY_SCORE: 58
ADLS_ACUITY_SCORE: 56
ADLS_ACUITY_SCORE: 56
ADLS_ACUITY_SCORE: 58
ADLS_ACUITY_SCORE: 58
ADLS_ACUITY_SCORE: 62
ADLS_ACUITY_SCORE: 62
ADLS_ACUITY_SCORE: 58
ADLS_ACUITY_SCORE: 60
ADLS_ACUITY_SCORE: 56
ADLS_ACUITY_SCORE: 58
ADLS_ACUITY_SCORE: 56
ADLS_ACUITY_SCORE: 56

## 2024-05-10 NOTE — PROGRESS NOTES
"PRIMARY DIAGNOSIS: \"GENERIC\" NURSING  OUTPATIENT/OBSERVATION GOALS TO BE MET BEFORE DISCHARGE:  ADLs back to baseline: No    Activity and level of assistance: Up with maximum assistance. Consider SW and/or PT evaluation.     Pain status: Pain free.    Return to near baseline physical activity: No     Discharge Planner Nurse   Safe discharge environment identified: No  Barriers to discharge: Yes  Bedside 1:1 initiated for safety, pt restless, unable to redirect, pt attempting to get out of bed and pulling on lines, VSS, remained on room air, no respiratory distress, O2 sat >92% prn medication given, IVF infusing, discharge pending.      Please review provider order for any additional goals.   Nurse to notify provider when observation goals have been met and patient is ready for discharge.  "

## 2024-05-10 NOTE — PLAN OF CARE
PRIMARY DIAGNOSIS: General Weakness  OUTPATIENT/OBSERVATION GOALS TO BE MET BEFORE DISCHARGE:  ADLs back to baseline: No    Activity and level of assistance: pt at baseline     Pain status: Improved-controlled with oral pain medications.    Return to near baseline physical activity: Yes     Discharge Planner Nurse   Safe discharge environment identified: Yes  Barriers to discharge: Yes       Entered by: Perla Rondon RN 05/10/2024 4:32 AM     Please review provider order for any additional goals.   Nurse to notify provider when observation goals have been met and patient is ready for discharge.    Pt A&Ox1, VSS, RA, Afebrile, dry infrequent cough.  PIV infusing LR @ 100mL/hr.  A2 with GBW.  Pt care ongoing, call light within reach, bed in lowest position, alarms on for safety. Discharge pending.

## 2024-05-10 NOTE — PROGRESS NOTES
[facility]    Medicine Progress Note - Hospitalist Service    Date of Admission:  5/8/2024    Assessment & Plan   Mr. Al Lynn is a 83 year old man with past medical history significant for Parkinson disease, hypertension, BPH, possible dementia who presented to the hospital on 5/8/2024 with generalized weakness. He was found to have COVID-19 infection, possible metabolic encephalopathy. Currently on Paxlovid. On presentation had some delirium, however, mental status appears to be improving. Questionable seizure-like activity on 5/10. Currently remains on one-on-one supervision. Possible discharge in 1 to 2 days if mental status and generalized weakness continues to improve.    COVID-19 infection  Possible COVID-19 pneumonia  Generalized weakness most likely due to COVID-19  -Respiratory status appears to be stable, currently saturating around 95% on room air.    Plan  -Continue paxlovid    Possible metabolic encephalopathy  Intermittent delirium  Altered mental status  -Per family, patient is alert and oriented x 3.  Able to hold a conversation.  -On presentation, patient was very confused and alert to self only.  -CT head negative for any acute abnormalities.  -Mental status appears to be improving over the last 24 hours, currently alert to self and place only.  However appears to be more awake and interactive.    Plan  -Keep patient on one-on-one supervision given episodes of delirium and questionable seizure-like activity.  -If mental status worsens or the patient did not return to baseline, consider MRI brain.    Mildly elevated troponin  -Mildly elevated proBNP on presentation, however, this is nonspecific.  -No clear signs of fluid overload on physical examination.  -Echocardiogram showed normal ejection fraction.  No wall motion abnormalities.    Plan  -Continue with LR at 75 cc/h.  Will most likely stop on 5/11 if p.o. intake is  "adequate.    Hypertension    Plan  -Continue home atenolol    Benign prostatic hyperplasia    Plan  -Continue home finasteride and tamsulosin     Parkinson's disease  Seizure-like activities.  -Patient has hand tremors, however, this morning the patient nurse, Dedra,  noticed more rhythmic hand tremors and patient rolled his eyes. This lasted for a few seconds and he returned to baseline.  Lactic acid normal which goes against seizure.     Plan  -Continue home carbidopa-levodopa  -Continue to monitor for any seizure-like activities, if patient develops any, we should consider CSF fluid sample to rule out meningitis  -If the patient develop fevers or worsening mental status, we should treat for possible meningitis/encephalitis.  -Continue to monitor for any seizure-like activities.  -Seizure precautions.  -Follow-up prolactin ordered immediately after reported seizure-like activities.    Possible dementia  -Continue home Aricept          Diet: Combination Diet Regular Diet Adult    DVT Prophylaxis: Enoxaparin (Lovenox) SQ  Felipe Catheter: Not present  Lines: None     Cardiac Monitoring: None  Code Status: No CPR- Do NOT Intubate      Clinically Significant Risk Factors Present on Admission                # Drug Induced Platelet Defect: home medication list includes an antiplatelet medication   # Hypertension: Noted on problem list      # Overweight: Estimated body mass index is 28.91 kg/m  as calculated from the following:    Height as of this encounter: 1.753 m (5' 9\").    Weight as of this encounter: 88.8 kg (195 lb 12.3 oz).              Disposition Plan     Medically Ready for Discharge: Anticipated Tomorrow             ARIEL PAYTON MD  Hospitalist Service  [facility]  Securely message with [employer] (more info)  Text page via [employer] Paging/Directory   ______________________________________________________________________    Interval History   Feeling slightly better today.  Denies any " chest pain or shortness of breath.  Denies any nausea or vomiting.  Denies any abdominal pain.    Physical Exam   Vital Signs: Temp: 97.2  F (36.2  C) Temp src: Axillary BP: 133/63 Pulse: 69   Resp: 18 SpO2: 92 % O2 Device: None (Room air)    Weight: 195 lbs 12.3 oz    Physical Exam  Constitutional:       General: He is not in acute distress.     Appearance: He is ill-appearing.   Cardiovascular:      Rate and Rhythm: Normal rate.      Heart sounds: No murmur heard.  Pulmonary:      Effort: Pulmonary effort is normal. No respiratory distress.      Breath sounds: No wheezing.   Neurological:      Mental Status: He is alert.      Motor: Tremor present.      Comments: Oriented to self and place but not time.  No focal weakness in bilateral upper or lower extremities.  Cranial nerve II to XII grossly intact.   Psychiatric:         Attention and Perception: He is attentive.         Behavior: Behavior is slowed. Behavior is not agitated or aggressive. Behavior is cooperative.          Medical Decision Making       52 MINUTES SPENT BY ME on the date of service doing chart review, history, exam, documentation & further activities per the note.      Data     I have personally reviewed the following data over the past 24 hrs:    7.1  \   13.3   / 119 (L)     135 102 17.8 /  114 (H)   3.8 26 1.03 \     Procal: N/A CRP: N/A Lactic Acid: 1.0         Imaging results reviewed over the past 24 hrs:   Recent Results (from the past 24 hour(s))   Echocardiogram Complete    Narrative    [id number]  [id number]  [id number]  [id number]^FITO^[place] [address]     Name: ANKIT BRUCE  MRN: [id number]  : 1940  Study Date: 05/10/2024 08:45 AM  Age: 83 yrs  Gender: Male  Patient Location: [facility]  Reason For Study: Chest Discomfort  Ordering Physician: ARIEL PAYTON  Performed By: CECILIA     BSA: 2.0 m2  Height: 69 in  Weight: 195 lb  HR: 48  BP: 104/53  mmHg  ______________________________________________________________________________  Procedure  Complete Portable Echo Adult. Definity (NDC #10332-313) given intravenously.  LOT [id number], Exp Apr 2025.  ______________________________________________________________________________  Interpretation Summary     1. Normal left ventricular size and systolic performance with a visually  estimated ejection fraction of 60%.  2. There is trace aortic insufficiency.  3. Normal right ventricular size and systolic performance.  4. There is mild left atrial enlargement.  5. There is mild enlargement of the proximal ascending aorta.  ______________________________________________________________________________  Left ventricle:  Normal left ventricular size and systolic performance with a visually  estimated ejection fraction of 60%. There is normal regional wall motion. Left  ventricular wall thickness is normal.     Assessment of LV Diastolic Function: The cumulative findings are indeterminate  in the evaluation of diastolic function [The septal e' velocity is < 7 cm/s &  lateral e' velocity is < 10 cm/s. The average E/e' is < 14. TR velocity is <  2.8 m/s. Left atrial volume index is greater than 34 mL/mÂ ].     Right ventricle:  Normal right ventricular size and systolic performance.     Left atrium:  There is mild left atrial enlargement.     Right atrium:  The right atrium is not well-visualized.     IVC:  The IVC is not well-visualized.     Aortic valve:  The aortic valve is comprised of three cusps. There is no significant aortic  stenosis. There is trace aortic insufficiency.     Mitral valve:  The mitral valve appears morphologically normal. There is trace mitral  insufficiency.     Tricuspid valve:  The tricuspid valve is grossly morphologically normal. There is trace  tricuspid insufficiency.     Pulmonic valve:  The pulmonic valve is grossly morphologically normal. There is mild pulmonic  insufficiency.     Thoracic  aorta:  There is mild enlargement of the proximal ascending aorta.     Pericardium:  There is no significant pericardial effusion.  ______________________________________________________________________________  ______________________________________________________________________________  MMode/2D Measurements & Calculations  IVSd: 0.99 cm  LVIDd: 5.2 cm  LVIDs: 3.4 cm  LVPWd: 0.92 cm  FS: 33.7 %  LV mass(C)d: 180.3 grams  LV mass(C)dI: 88.2 grams/m2  Ao root diam: 3.3 cm  LA dimension: 5.1 cm  asc Aorta Diam: 4.0 cm  LA/Ao: 1.5  LVOT diam: 2.2 cm  LVOT area: 3.8 cm2  Ao root diam index Ht(cm/m): 1.9  Ao root diam index BSA (cm/m2): 1.6  Asc Ao diam index BSA (cm/m2): 2.0  Asc Ao diam index Ht(cm/m): 2.3  EF Biplane: 64.9 %  LA Volume (BP): 82.2 ml     LA Volume Index (BP): 40.3 ml/m2  LA Volume Indexed (AL/bp): 42.3 ml/m2  RWT: 0.36  TAPSE: 2.6 cm     Time Measurements  MM HR: 48.0 BPM     Doppler Measurements & Calculations  MV E max juan antonio: 62.1 cm/sec  MV A max juan antonio: 77.6 cm/sec  MV E/A: 0.80  MV dec slope: 229.1 cm/sec2  MV dec time: 0.27 sec  Ao V2 max: 175.1 cm/sec  Ao max P.0 mmHg  Ao V2 mean: 127.9 cm/sec  Ao mean P.3 mmHg  Ao V2 VTI: 39.2 cm  SHANTANU(I,D): 2.3 cm2  SHANTANU(V,D): 2.2 cm2  LV V1 max P.3 mmHg  LV V1 max: 103.3 cm/sec  LV V1 VTI: 24.0 cm  SV(LVOT): 91.3 ml  SI(LVOT): 44.7 ml/m2  PA acc time: 0.13 sec  TR max juan antonio: 259.3 cm/sec  TR max P.9 mmHg  AV Juan Antonio Ratio (DI): 0.59  SHANTANU Index (cm2/m2): 1.1  E/E': 10.4  E/E' av.8     Lateral E/e': 10.4  Medial E/e': 13.3  Peak E' Juan Antonio: 6.0 cm/sec  RV S Juan Antonio: 13.2 cm/sec     ______________________________________________________________________________  Report approved by: Deepa Pham 05/10/2024 02:16 PM

## 2024-05-10 NOTE — PROGRESS NOTES
"PRIMARY DIAGNOSIS: \"GENERIC\" NURSING  OUTPATIENT/OBSERVATION GOALS TO BE MET BEFORE DISCHARGE:  ADLs back to baseline: No    Activity and level of assistance: Up with A2 gb and walker    Pain status: Pain free.    Return to near baseline physical activity: Yes     Discharge Planner Nurse   Safe discharge environment identified: Yes  Barriers to discharge: Yes           Entered by: Dedra Harrison RN 05/10/2024 12:35 PM   Pt A&O x1. Denies pain. No uncontrollable shaking noted. LR @ 75ml/hr. 1:1sitter in place. Call light within reach.   Please review provider order for any additional goals.   Nurse to notify provider when observation goals have been met and patient is ready for discharge.  "

## 2024-05-10 NOTE — PROGRESS NOTES
"PRIMARY DIAGNOSIS: \"GENERIC\" NURSING  OUTPATIENT/OBSERVATION GOALS TO BE MET BEFORE DISCHARGE:  ADLs back to baseline: No    Activity and level of assistance: Up with A2, baseline    Pain status: Pain free.    Return to near baseline physical activity: Yes     Discharge Planner Nurse   Safe discharge environment identified: Yes  Barriers to discharge: Yes          Entered by: Dedra Harrison RN 05/10/2024 9:58 AM   PT A&O x1 and VSS. Denies pain. LR @ 75ml/hr. Call light within reach. 1:1 sitter in place.  Please review provider order for any additional goals.   Nurse to notify provider when observation goals have been met and patient is ready for discharge.  "

## 2024-05-10 NOTE — CONSULTS
Care Management Follow Up    Length of Stay (days): 1    Expected Discharge Date: 05/11/2024     Concerns to be Addressed: discharge planning       Patient plan of care discussed at interdisciplinary rounds: Yes    Anticipated Discharge Disposition:  [place]     Anticipated Discharge Services: Transportation Services    Anticipated Discharge DME: None    Education Provided on the Discharge Plan: Yes (AVS per bedside RN)    Patient/Family in Agreement with the Plan: yes         Additional Information:  CM reviewed chart. CM called [facility] to inquire about where the resides. CM left  for nurse. CM requesting a return call. 10:45 AM  CM attempted to reach Cici (wife) and Marni (daughter). Cici did not answer, phone disconnected after 2 rings. Marni did not answer. 1:01 PM   CM confirmed with [facility] that patient lives in memory care with his wife. 1:07 PM     [facility] - nurse phone # for the weekend [phone] (phone # during business hours [phone])  Fax # [phone]         CM consult for Discharge Planning/Disposition      CM unable to discuss MOON with patient due to confusion and unable to reach family. 1:17 PM       Ashley Ray RN

## 2024-05-10 NOTE — PROGRESS NOTES
CM called [facility] to inquire about where the resides. CM left  for nurse. CM requesting a return call. 10:45 AM

## 2024-05-10 NOTE — PROGRESS NOTES
Pt experienced seizure-like activity during assessment. Shaking uncontrollably, and eye-rolling for about 5 secs. VSS and resumed normal activity. Oriented to self per usual. Provider contacted and new orders placed. Charge nurse updated.

## 2024-05-11 LAB
ALBUMIN SERPL BCG-MCNC: 3.7 G/DL (ref 3.5–5.2)
ALP SERPL-CCNC: 58 U/L (ref 40–150)
ALT SERPL W P-5'-P-CCNC: <5 U/L (ref 0–70)
ANION GAP SERPL CALCULATED.3IONS-SCNC: 12 MMOL/L (ref 7–15)
AST SERPL W P-5'-P-CCNC: 28 U/L (ref 0–45)
BASOPHILS # BLD AUTO: 0 10E3/UL (ref 0–0.2)
BASOPHILS NFR BLD AUTO: 0 %
BILIRUB SERPL-MCNC: 0.5 MG/DL
BUN SERPL-MCNC: 16.8 MG/DL (ref 8–23)
CALCIUM SERPL-MCNC: 8.9 MG/DL (ref 8.8–10.2)
CHLORIDE SERPL-SCNC: 104 MMOL/L (ref 98–107)
CREAT SERPL-MCNC: 1.09 MG/DL (ref 0.67–1.17)
DEPRECATED HCO3 PLAS-SCNC: 22 MMOL/L (ref 22–29)
EGFRCR SERPLBLD CKD-EPI 2021: 67 ML/MIN/1.73M2
EOSINOPHIL # BLD AUTO: 0.2 10E3/UL (ref 0–0.7)
EOSINOPHIL NFR BLD AUTO: 4 %
ERYTHROCYTE [DISTWIDTH] IN BLOOD BY AUTOMATED COUNT: 12.8 % (ref 10–15)
GLUCOSE SERPL-MCNC: 171 MG/DL (ref 70–99)
HCT VFR BLD AUTO: 41.8 % (ref 40–53)
HGB BLD-MCNC: 14.1 G/DL (ref 13.3–17.7)
IMM GRANULOCYTES # BLD: 0 10E3/UL
IMM GRANULOCYTES NFR BLD: 0 %
LYMPHOCYTES # BLD AUTO: 2.1 10E3/UL (ref 0.8–5.3)
LYMPHOCYTES NFR BLD AUTO: 36 %
MAGNESIUM SERPL-MCNC: 1.9 MG/DL (ref 1.7–2.3)
MCH RBC QN AUTO: 32.1 PG (ref 26.5–33)
MCHC RBC AUTO-ENTMCNC: 33.7 G/DL (ref 31.5–36.5)
MCV RBC AUTO: 95 FL (ref 78–100)
MONOCYTES # BLD AUTO: 0.3 10E3/UL (ref 0–1.3)
MONOCYTES NFR BLD AUTO: 6 %
NEUTROPHILS # BLD AUTO: 3.3 10E3/UL (ref 1.6–8.3)
NEUTROPHILS NFR BLD AUTO: 55 %
NRBC # BLD AUTO: 0 10E3/UL
NRBC BLD AUTO-RTO: 0 /100
PLATELET # BLD AUTO: 144 10E3/UL (ref 150–450)
POTASSIUM SERPL-SCNC: 3.9 MMOL/L (ref 3.4–5.3)
PROT SERPL-MCNC: 6.6 G/DL (ref 6.4–8.3)
RBC # BLD AUTO: 4.39 10E6/UL (ref 4.4–5.9)
SODIUM SERPL-SCNC: 138 MMOL/L (ref 135–145)
WBC # BLD AUTO: 6 10E3/UL (ref 4–11)

## 2024-05-11 PROCEDURE — 93005 ELECTROCARDIOGRAM TRACING: CPT | Performed by: FAMILY MEDICINE

## 2024-05-11 PROCEDURE — 96372 THER/PROPH/DIAG INJ SC/IM: CPT | Performed by: STUDENT IN AN ORGANIZED HEALTH CARE EDUCATION/TRAINING PROGRAM

## 2024-05-11 PROCEDURE — 93005 ELECTROCARDIOGRAM TRACING: CPT

## 2024-05-11 PROCEDURE — 99222 1ST HOSP IP/OBS MODERATE 55: CPT | Performed by: INTERNAL MEDICINE

## 2024-05-11 PROCEDURE — 250N000013 HC RX MED GY IP 250 OP 250 PS 637: Performed by: STUDENT IN AN ORGANIZED HEALTH CARE EDUCATION/TRAINING PROGRAM

## 2024-05-11 PROCEDURE — 99207 PR APP CREDIT; MD BILLING SHARED VISIT: CPT | Performed by: FAMILY MEDICINE

## 2024-05-11 PROCEDURE — G0378 HOSPITAL OBSERVATION PER HR: HCPCS

## 2024-05-11 PROCEDURE — 250N000011 HC RX IP 250 OP 636: Mod: JZ | Performed by: FAMILY MEDICINE

## 2024-05-11 PROCEDURE — 99233 SBSQ HOSP IP/OBS HIGH 50: CPT | Performed by: STUDENT IN AN ORGANIZED HEALTH CARE EDUCATION/TRAINING PROGRAM

## 2024-05-11 PROCEDURE — 250N000013 HC RX MED GY IP 250 OP 250 PS 637

## 2024-05-11 PROCEDURE — 93005 ELECTROCARDIOGRAM TRACING: CPT | Performed by: INTERNAL MEDICINE

## 2024-05-11 PROCEDURE — 85004 AUTOMATED DIFF WBC COUNT: CPT | Performed by: INTERNAL MEDICINE

## 2024-05-11 PROCEDURE — 250N000009 HC RX 250: Performed by: INTERNAL MEDICINE

## 2024-05-11 PROCEDURE — 80053 COMPREHEN METABOLIC PANEL: CPT | Performed by: INTERNAL MEDICINE

## 2024-05-11 PROCEDURE — 120N000004 HC R&B MS OVERFLOW

## 2024-05-11 PROCEDURE — 250N000011 HC RX IP 250 OP 636: Performed by: STUDENT IN AN ORGANIZED HEALTH CARE EDUCATION/TRAINING PROGRAM

## 2024-05-11 PROCEDURE — 83735 ASSAY OF MAGNESIUM: CPT | Performed by: INTERNAL MEDICINE

## 2024-05-11 PROCEDURE — 99418 PROLNG IP/OBS E/M EA 15 MIN: CPT | Performed by: STUDENT IN AN ORGANIZED HEALTH CARE EDUCATION/TRAINING PROGRAM

## 2024-05-11 PROCEDURE — 36415 COLL VENOUS BLD VENIPUNCTURE: CPT | Performed by: INTERNAL MEDICINE

## 2024-05-11 RX ORDER — ATENOLOL 25 MG/1
12.5 TABLET ORAL DAILY
Status: DISCONTINUED | OUTPATIENT
Start: 2024-05-12 | End: 2024-05-12

## 2024-05-11 RX ORDER — ADENOSINE 3 MG/ML
6 INJECTION, SOLUTION INTRAVENOUS ONCE
Status: COMPLETED | OUTPATIENT
Start: 2024-05-11 | End: 2024-05-11

## 2024-05-11 RX ORDER — METOPROLOL TARTRATE 1 MG/ML
5 INJECTION, SOLUTION INTRAVENOUS EVERY 5 MIN PRN
Status: DISCONTINUED | OUTPATIENT
Start: 2024-05-11 | End: 2024-05-13 | Stop reason: HOSPADM

## 2024-05-11 RX ADMIN — ASPIRIN 81 MG: 81 TABLET, COATED ORAL at 10:11

## 2024-05-11 RX ADMIN — TRAZODONE HYDROCHLORIDE 50 MG: 50 TABLET ORAL at 22:26

## 2024-05-11 RX ADMIN — NIRMATRELVIR AND RITONAVIR 3 TABLET: KIT at 20:29

## 2024-05-11 RX ADMIN — FINASTERIDE 5 MG: 5 TABLET, FILM COATED ORAL at 10:10

## 2024-05-11 RX ADMIN — TAMSULOSIN HYDROCHLORIDE 0.4 MG: 0.4 CAPSULE ORAL at 20:29

## 2024-05-11 RX ADMIN — METOPROLOL TARTRATE 5 MG: 5 INJECTION INTRAVENOUS at 19:04

## 2024-05-11 RX ADMIN — ENOXAPARIN SODIUM 40 MG: 100 INJECTION SUBCUTANEOUS at 17:31

## 2024-05-11 RX ADMIN — CARBIDOPA AND LEVODOPA 2 TABLET: 25; 100 TABLET ORAL at 13:46

## 2024-05-11 RX ADMIN — CARBIDOPA AND LEVODOPA 2 TABLET: 25; 100 TABLET ORAL at 20:28

## 2024-05-11 RX ADMIN — CARBIDOPA AND LEVODOPA 2 TABLET: 25; 100 TABLET ORAL at 06:48

## 2024-05-11 RX ADMIN — DONEPEZIL HYDROCHLORIDE 5 MG: 5 TABLET, FILM COATED ORAL at 10:10

## 2024-05-11 RX ADMIN — ADENOSINE 6 MG: 3 INJECTION INTRAVENOUS at 19:58

## 2024-05-11 RX ADMIN — ATENOLOL 50 MG: 50 TABLET ORAL at 10:10

## 2024-05-11 RX ADMIN — METOPROLOL TARTRATE 5 MG: 5 INJECTION INTRAVENOUS at 19:29

## 2024-05-11 RX ADMIN — NIRMATRELVIR AND RITONAVIR 3 TABLET: KIT at 10:11

## 2024-05-11 RX ADMIN — METOPROLOL TARTRATE 5 MG: 5 INJECTION INTRAVENOUS at 19:14

## 2024-05-11 ASSESSMENT — ACTIVITIES OF DAILY LIVING (ADL)
ADLS_ACUITY_SCORE: 55
ADLS_ACUITY_SCORE: 60
ADLS_ACUITY_SCORE: 60
ADLS_ACUITY_SCORE: 58
ADLS_ACUITY_SCORE: 51
ADLS_ACUITY_SCORE: 51
ADLS_ACUITY_SCORE: 58
ADLS_ACUITY_SCORE: 55
ADLS_ACUITY_SCORE: 58
ADLS_ACUITY_SCORE: 58
ADLS_ACUITY_SCORE: 53
ADLS_ACUITY_SCORE: 55
ADLS_ACUITY_SCORE: 51
ADLS_ACUITY_SCORE: 58
ADLS_ACUITY_SCORE: 55
ADLS_ACUITY_SCORE: 51
ADLS_ACUITY_SCORE: 59
ADLS_ACUITY_SCORE: 51
ADLS_ACUITY_SCORE: 55
ADLS_ACUITY_SCORE: 51

## 2024-05-11 NOTE — PROGRESS NOTES
"PRIMARY DIAGNOSIS: \"GENERIC\" NURSING  OUTPATIENT/OBSERVATION GOALS TO BE MET BEFORE DISCHARGE:  ADLs back to baseline: No    Activity and level of assistance: Up with standby assistance.    Pain status: Pain free.    Return to near baseline physical activity: No     Discharge Planner Nurse   Safe discharge environment identified: Yes  Barriers to discharge: Yes       Pt A&O x4 and denies pain, SOB, and chest pain. PIV patent and SL. Tele reading Sinus [place] with BBB. Patient remains [place] and new orders placed.1:1 sitter in place and call light within reach.          Entered by: Dedra Harrison RN 05/11/2024 2:32 PM     Please review provider order for any additional goals.   Nurse to notify provider when observation goals have been met and patient is ready for discharge.  "

## 2024-05-11 NOTE — SIGNIFICANT EVENT
Remote cross cover -- Significant Event Note - informed of SVT - -150s    A - SVT in a patient with multiple co-morbidities, incl admission for COVID infection.     Plans - Informed HARRIET Qureshi that remote cross cover calls in a patient with SVT is NOT appropriate. This patient will benefit from onsite MD evaluation or calling RRT given potentially life threatening rhythm disturbance and patient  can deteriorate rapidly. I did inform RN of this. I will add labs to check electrolytes/protocol added. Keep K above 4, and Mag above 2     I am not able to provide acute remote cross cover recommendations at this point. I will inform onsite MD about this for bedside evaluation.       728PM - I did inform HARRIET Qureshi again -- to call RRT, as patient remained SVT for more than an hour now. I also did call the tele tech to confirm the duration.     Per HARRIET Qureshi - I notified you because there is a parameter order to notify MD if HR is above 120. If patient was symptomatic I would have absolutely called RRT. Thank you    RN was informed this is NOT about symptoms alone - this is a persistent SVT despite medication - and for safety concerns - will need onsite MD evaluation.     I am not sure what's RNs hesitancy for calling RRT.  I am very concerned with this patients current condition. I will reach out to the charge RN for care concerns.

## 2024-05-11 NOTE — PLAN OF CARE
"PRIMARY DIAGNOSIS: \"GENERIC\" NURSING  OUTPATIENT/OBSERVATION GOALS TO BE MET BEFORE DISCHARGE:  ADLs back to baseline: No    Activity and level of assistance: Up with standby assistance.    Pain status: N/A    Return to near baseline physical activity: No     Please review provider order for any additional goals.   Nurse to notify provider when observation goals have been met and patient is ready for discharge.Goal Outcome Evaluation:                        "

## 2024-05-11 NOTE — PROGRESS NOTES
[facility]    Medicine Progress Note - Hospitalist Service    Date of Admission:  5/8/2024    Assessment & Plan   Mr. Al Lynn is a 83 year old man with past medical history significant for Parkinson disease, hypertension, BPH, possible dementia who presented to the hospital on 5/8/2024 with generalized weakness. He was found to have COVID-19 infection, possible metabolic encephalopathy. Currently on Paxlovid. On presentation had some delirium, however, mental status appears to be improving. Questionable seizure-like activity on 5/10. Currently remains on one-on-one supervision. Possible discharge in 1 to 2 days if mental status and generalized weakness continues to improve.    Changes today:  -Mental status much improved from previous reports  -Asymptomatic bradycardia with pauses, cardiology consulted, previously had similar issues atenolol was reduced from 50 to 25 and patient developed SVT  -Discontinued IVF, tolerating PO intake without difficulty    COVID-19 infection  Possible COVID-19 pneumonia  Generalized weakness most likely due to COVID-19  -Respiratory status appears to be stable, currently saturating in the 90-97 range on room air    Plan  -Continue paxlovid    Possible metabolic encephalopathy  Intermittent delirium  Altered mental status  -Per family, patient is alert and oriented x 3.  Able to hold a conversation.  -On presentation, patient was very confused and alert to self only.  -CT head negative for any acute abnormalities.  -Mental status continues to improve    Plan  -Keep patient on one-on-one supervision given episodes of delirium and questionable seizure-like activity.  -If mental status worsens or the patient did not return to baseline, consider MRI brain.    Mildly elevated troponin  -Mildly elevated proBNP on presentation, however, this is nonspecific.  -No clear signs of fluid overload on physical examination.  -Echocardiogram showed normal ejection  fraction.  No wall motion abnormalities.    Plan  -stable for outpatient followup    Hypertension    Plan  -Atenolol, dose reduced on 5/11    Benign prostatic hyperplasia    Plan  -Continue home finasteride and tamsulosin     Parkinson's disease  Seizure-like activities.  -Patient has hand tremors, however, this morning the patient nurse, Dedra,  noticed more rhythmic hand tremors and patient rolled his eyes. This lasted for a few seconds and he returned to baseline.  Lactic acid normal which goes against seizure. No postictal state per reports, no further episodes at this time. Question if patient could have been experiencing some bradycardia which triggered this brief event.    Plan  -Continue home carbidopa-levodopa  -Continue to monitor for any seizure-like activities, if patient develops any, we should consider CSF fluid sample to rule out meningitis  -If the patient develop fevers or worsening mental status, we should treat for possible meningitis/encephalitis.  -Continue to monitor for any seizure-like activities.  -Seizure precautions.  -Prolactin negative     Possible dementia  -Continue home Aricept    Bradycardia  Hx of SVT-is on atenolol at baseline 50 mg, placed on telemetry due to concern for seizure, telemetry picked up bradycardia down to 26 with some longer pauses.  He has known chronic bradycardia, previously his dose was attempted to be reduced to 25 mg from 50 unfortunately patient subsequently went back into SVT and thus his atenolol was then adjusted back to 50 mg.  Given his history of SVT with dose reductions of atenolol we will consult cardiology for their expertise.  -Cards consulted, appreciate recs  -PTA atenolol dose reduced to 12.5            Diet: Combination Diet Regular Diet Adult    DVT Prophylaxis: Enoxaparin (Lovenox) SQ  Felipe Catheter: Not present  Lines: None     Cardiac Monitoring: ACTIVE order. Indication: [place]  Code Status: No CPR- Do NOT Intubate      Clinically  "Significant Risk Factors Present on Admission                # Drug Induced Platelet Defect: home medication list includes an antiplatelet medication   # Hypertension: Noted on problem list      # Overweight: Estimated body mass index is 28.91 kg/m  as calculated from the following:    Height as of this encounter: 1.753 m (5' 9\").    Weight as of this encounter: 88.8 kg (195 lb 12.3 oz).              Disposition Plan     Medically Ready for Discharge: Anticipated Tomorrow             Ariel Connell MD  Hospitalist Service  [facility]  Securely message with [employer] (more info)  Text page via [facility] Paging/Directory   ______________________________________________________________________    Interval History   Feeling good today, denies any shortness of breathe, tolerating PO intake without difficulty, denies any dizziness or lightheadedness.    Physical Exam   Vital Signs: Temp: 98.4  F (36.9  C) Temp src: Oral BP: 128/62 Pulse: 58   Resp: 18 SpO2: 90 % O2 Device: None (Room air)    Weight: 195 lbs 12.3 oz         Medical Decision Making       60 MINUTES SPENT BY ME on the date of service doing chart review, history, exam, documentation & further activities per the note.      Data         Imaging results reviewed over the past 24 hrs:   No results found for this or any previous visit (from the past 24 hour(s)).    "

## 2024-05-11 NOTE — PROGRESS NOTES
"PRIMARY DIAGNOSIS: \"GENERIC\" NURSING  OUTPATIENT/OBSERVATION GOALS TO BE MET BEFORE DISCHARGE:  ADLs back to baseline: No    Activity and level of assistance: Up with standby assistance.    Pain status: Pain free.    Return to near baseline physical activity: No     Discharge Planner Nurse   Safe discharge environment identified: Yes  Barriers to discharge: Yes       Entered by: Dedra Harrison RN 05/11/2024 2:29 PM   Pt A&O x4 and denies pain, SOB, and chest pain. PIV patent and SL. Tele reading Sinus [place] with BBB. 1:1 sitter in place and call light within reach.   Please review provider order for any additional goals.   Nurse to notify provider when observation goals have been met and patient is ready for discharge.  "

## 2024-05-11 NOTE — PROGRESS NOTES
Care Management Follow Up    Length of Stay (days): 1    Expected Discharge Date: 05/12/2024     Concerns to be Addressed: discharge planning       Patient plan of care discussed at interdisciplinary rounds: Yes    Anticipated Discharge Disposition:  [facility]     Anticipated Discharge Services: Transportation Services    Anticipated Discharge DME: None      Education Provided on the Discharge Plan: Yes (AVS per bedside RN)    Patient/Family in Agreement with the Plan: unable to assess        Additional Information:  CM reviewed chart. CM called and spoke to  (nurse) at [facility] to update and coordinate discharge. Patient is able to return on the weekend. CM unable to reach family. Transportation TBD. CM will follow.      Referral pending  [facility]        Ashley Ray RN

## 2024-05-11 NOTE — CONSULTS
"  Thank you,  No ref. provider found, for asking the [facility] Care team to see Al Lynn to evaluate bradycardia.      Assessment/Recommendations   Assessment:    Sinus bradycardia with short sinus pauses(less than 3 seconds) consistent with sinus node dysfunction, asymptomatic-potentially aggravated by atenolol and current COVID-19 infection/treatment  History of SVT, no recent episodes  COVID-19 infection  Parkinson's disease  Cognitive impairment    Plan:  Reduce dose of atenolol to 12.5 mg a day  Could be considered for SVT ablation if he redevelops tachyarrhythmias on reduced dose of atenolol  No indication for pacemaker at this point.      Clinically Significant Risk Factors Present on Admission                # Drug Induced Platelet Defect: home medication list includes an antiplatelet medication   # Hypertension: Noted on problem list      # Overweight: Estimated body mass index is 28.91 kg/m  as calculated from the following:    Height as of this encounter: 1.753 m (5' 9\").    Weight as of this encounter: 88.8 kg (195 lb 12.3 oz).                  History of Present Illness/Subjective    Mr. Al yLnn is a 83 year old male who was admitted for progressive weakness.  He was found to have acute COVID-19 infection.  While on telemetry he was noted to be bradycardic.  He had no symptoms.  He has no complaints of chest pain or heart palpitation.  He denies syncope or near syncope.  He is limited by Parkinson's disease.  He can walk down the [place] without much of the issues  His weight has been stable.  He has no PND orthopnea.  In 2022 he had SVTs.  He was seen by one of our cardiologist.  He was referred to EP for consideration of SVT ablation.  He never made to this appointment.  He reports no history of known obstructive coronary artery disease or valvular heart disease.    ECG: Personally reviewed.  5/10/2024 sinus bradycardia otherwise normal    Telemetry shows sinus bradycardia " "with occasional short sinus pauses( less than 3 seconds) and escape junctional beats.    ECHO (personnaly Reviewed):   1. Normal left ventricular size and systolic performance with a visually  estimated ejection fraction of 60%.  2. There is trace aortic insufficiency.  3. Normal right ventricular size and systolic performance.  4. There is mild left atrial enlargement.  5. There is mild enlargement of the proximal ascending aorta.         Physical Examination Review of Systems   /62 (BP Location: Right arm, Patient Position: Semi-Domingo's, Cuff Size: Adult Regular)   Pulse 58   Temp 98.4  F (36.9  C) (Oral)   Resp 18   Ht 1.753 m (5' 9\")   Wt 88.8 kg (195 lb 12.3 oz)   SpO2 90%   BMI 28.91 kg/m    Body mass index is 28.91 kg/m .  Wt Readings from Last 3 Encounters:   05/10/24 88.8 kg (195 lb 12.3 oz)   06/15/22 85.7 kg (189 lb)   03/03/22 85.7 kg (189 lb)       Intake/Output Summary (Last 24 hours) at 5/11/2024 1312  Last data filed at 5/11/2024 1134  Gross per 24 hour   Intake 2270 ml   Output 1075 ml   Net 1195 ml     General Appearance:   Alert, cooperative, no distress, appears stated age   Head/ENT: Normocephalic, without obvious abnormality. Membranes moist.      EYES:  No scleral icterus   Neck: Supple, symmetrical, trachea midline, no adenopathy, thyroid: not enlarged, symmetric, no carotid bruit or JVD   Chest/Lungs:   lungs are clear to auscultation, respirations unlabored. No tenderness or deformity   Cardiovascular:   Regular rhythm, S1, S2 normal, no murmur, rub or gallop.   Abdomen:  Soft, non-tender, bowel sounds active all four quadrants,  no masses, no organomegaly   Extremities: no cyanosis or clubbing. No edema   Skin: Skin color, texture, turgor normal, no rashes or lesions.    Neurologic: Alert and oriented x 3, moving all four extremities.    Psychiatric: Normal affect.      10 system review of systems completed see history of present illness and inpatient H&P (reviewed) for " "further details.          Lab Results    Chemistry/lipid CBC Cardiac Enzymes/BNP/TSH/INR   No results for input(s): \"CHOL\", \"HDL\", \"LDL\", \"TRIG\", \"CHOLHDLRATIO\" in the last 67422 hours.  No results for input(s): \"LDL\" in the last 97404 hours.  Recent Labs   Lab Test 05/10/24  0843      POTASSIUM 3.8   CHLORIDE 102   CO2 26   *   BUN 17.8   CR 1.03   GFRESTIMATED 72   EMILE 8.2*     Recent Labs   Lab Test 05/10/24  0843 05/09/24  0609 05/08/24  1841   CR 1.03 1.04 1.13     No results for input(s): \"A1C\" in the last 53903 hours.       Recent Labs   Lab Test 05/10/24  0843   WBC 7.1   HGB 13.3   HCT 39.0*   MCV 95   *     Recent Labs   Lab Test 05/10/24  0843 05/09/24  0609 05/08/24  1841   HGB 13.3 13.3 13.3    Recent Labs   Lab Test 06/15/22  1415 06/15/22  1151   TROPONINI <0.01 <0.01     Recent Labs   Lab Test 05/08/24  1841 06/15/22  1151   BNP  --  61   NTBNPI 1,482  --      Recent Labs   Lab Test 01/31/24  1220   TSH 2.98     No results for input(s): \"INR\" in the last 33094 hours.     Medical History  Surgical History Family History Social History   Past Medical History:   Diagnosis Date    Colon polyp     Cough     Depression     Diverticulitis     Head injury     fell working in garden    Hearing loss     Hemorrhoids     HTN (hypertension)     Sinus drainage     coughing spells?     Past Surgical History:   Procedure Laterality Date    ESOPHAGOSCOPY, GASTROSCOPY, DUODENOSCOPY (EGD), COMBINED  4/21/2014    Procedure: Gastroscopy ;  Surgeon: Bradley Colmenares MD;  Location: [facility]    HERNIA REPAIR, INGUINAL RT/LT      Z APPENDECTOMY       No premature CAD, SCD,cardiomyopathy   Social History     Socioeconomic History    Marital status:      Spouse name: Not on file    Number of children: Not on file    Years of education: Not on file    Highest education level: Not on file   Occupational History    Not on file   Tobacco Use    Smoking status: Former    Smokeless tobacco: Never   Substance " and Sexual Activity    Alcohol use: Yes     Comment: every day    Drug use: No    Sexual activity: Not on file   Other Topics Concern    Parent/sibling w/ CABG, MI or angioplasty before 65F 55M? Not Asked   Social History Narrative    Not on file     Social Determinants of Health     Financial Resource Strain: Low Risk  (2/28/2023)    [facility], [facility] [employer] [facility]    Financial Resource Strain     Difficulty of Paying Living Expenses: 3     Difficulty of Paying Living Expenses: Not on file   Food Insecurity: No Food Insecurity (2/28/2023)    [facility], [facility] [employer]  [facility]    Food Insecurity     Worried About Running Out of Food in the Last Year: 1   Transportation Needs: No Transportation Needs (2/28/2023)    Received from [employer] [facility], [facility] [employer] [facility]    Transportation Needs     Lack of Transportation (Medical): 1   Physical Activity: Not on file   Stress: Not on file   Social Connections: Unknown (3/1/2024)    [facility]    Social Connections     Frequency of Communication with Friends and Family: Not on file   Interpersonal Safety: Not on file   Housing Stability: Low Risk  (2/28/2023)    [facility]    Housing Stability     Unable to Pay for Housing in the Last Year: 1         Medications  Allergies   Current Facility-Administered Medications Ordered in Epic   Medication Dose Route Frequency Provider Last Rate Last Admin    acetaminophen (TYLENOL) tablet 650 mg  650 mg Oral Q4H PRN Haleigh Lamas PA-C   650 mg at 05/10/24 0307    Or    acetaminophen (TYLENOL) Suppository 650 mg  650 mg Rectal Q4H PRN Haleigh Lamas PA-C        aspirin EC tablet 81 mg  81 mg Oral Daily  Haleigh Lamas PA-C   81 mg at 05/11/24 1011    atenolol (TENORMIN) tablet 50 mg  50 mg Oral Daily Tristan Kwong MD   50 mg at 05/11/24 1010    calcium carbonate (TUMS) chewable tablet 1,000 mg  1,000 mg Oral 4x Daily PRN Haleigh Lamas PA-C        carbidopa-levodopa (SINEMET)  MG per tablet 2 tablet  2 tablet Oral TID Haleigh Lamas PA-C   2 tablet at 05/11/24 0648    donepezil (ARICEPT) tablet 5 mg  5 mg Oral Daily Haleigh Lamas PA-C   5 mg at 05/11/24 1010    enoxaparin ANTICOAGULANT (LOVENOX) injection 40 mg  40 mg Subcutaneous Q24H Tristan Kwong MD   40 mg at 05/10/24 1734    finasteride (PROSCAR) tablet 5 mg  5 mg Oral QAM Haleigh Lamas PA-C   5 mg at 05/11/24 1010    lidocaine (LMX4) cream   Topical Q1H PRN Haleigh Lamas PA-C        lidocaine 1 % 0.1-1 mL  0.1-1 mL Other Q1H PRN Haleigh Lamas PA-C        nirmatrelvir and ritonavir (PAXLOVID) 300 mg/100 mg therapy pack 3 tablet  3 tablet Oral BID Tristan Kwong MD   3 tablet at 05/11/24 1011    OLANZapine (zyPREXA) injection 2.5 mg  2.5 mg Intramuscular Daily PRN Bradley Roy DO        OLANZapine (zyPREXA) tablet 2.5 mg  2.5 mg Oral BID PRN Bradley Roy DO   2.5 mg at 05/09/24 1652    ondansetron (ZOFRAN ODT) ODT tab 4 mg  4 mg Oral Q6H PRN Haleigh Lamas PA-C        Or    ondansetron (ZOFRAN) injection 4 mg  4 mg Intravenous Q6H PRN Haleigh Lamas PA-C        senna-docusate (SENOKOT-S/PERICOLACE) 8.6-50 MG per tablet 1 tablet  1 tablet Oral BID PRN Haleigh Lamas PA-C        Or    senna-docusate (SENOKOT-S/PERICOLACE) 8.6-50 MG per tablet 2 tablet  2 tablet Oral BID PRN Haleigh Lamas PA-C        sodium chloride (PF) 0.9% PF flush 3 mL  3 mL Intracatheter Q8H Haleigh Lamas PA-C   3 mL at 05/09/24 1652    sodium chloride (PF) 0.9% PF flush 3 mL  3 mL Intracatheter q1 min prn Haleigh Lamas PA-C        tamsulosin (FLOMAX) capsule 0.4 mg  0.4 mg Oral QPM Haleigh Lamas PA-C   0.4 mg at 05/10/24 2001    traZODone (DESYREL) tablet 50 mg  50  mg Oral At Bedtime Haleigh Lamas PA-C   50 mg at 05/09/24 2100     No current Epic-ordered outpatient medications on file.       Allergies   Allergen Reactions    Nka [No Known Allergies]

## 2024-05-11 NOTE — PROGRESS NOTES
"PRIMARY DIAGNOSIS: \"GENERAL WEAKNESS\"  OUTPATIENT/OBSERVATION GOALS TO BE MET BEFORE DISCHARGE:  ADLs back to baseline: No. Due to weakness    Activity and level of assistance: Up with maximum assistance. Consider SW and/or PT evaluation. Assist of 2. Gaitbelt and walker     Pain status: Pain free.    Return to near baseline physical activity: No     Discharge Planner Nurse   Safe discharge environment identified: No  Barriers to discharge: Yes       Entered by: Samy Gil RN 05/11/2024 6:35 AM     Please review provider order for any additional goals.   Nurse to notify provider when observation goals have been met and patient is ready for discharge.    Pt is able to transfer to bed side commode with assist of 2 with gait belt and walker. O2 was 95 on RA. AO x4. VSS. Did not have another episode of seizure. 1:1 bedside attendant for safety, seizure precautions. Telemetry monitoring, continues to be bradycardic, in the low 40's mostly, asymptomatic.   "

## 2024-05-11 NOTE — PLAN OF CARE
"PRIMARY DIAGNOSIS: \"GENERIC\" NURSING  OUTPATIENT/OBSERVATION GOALS TO BE MET BEFORE DISCHARGE:  ADLs back to baseline: No    Activity and level of assistance: Up with standby assistance.    Pain status: Pain free.    Return to near baseline physical activity: No     Please review provider order for any additional goals.   Nurse to notify provider when observation goals have been met and patient is ready for discharge.Goal Outcome Evaluation:         Alert, oriented to self; disoriented to place and time. Calm an cooperative. No seizure-like episode this shift. 1:1 bedside attendant for safety, seizure precautions. Telemetry monitoring, continues to be bradycardic, in the low 50's mostly, asymptomatic.               "

## 2024-05-12 ENCOUNTER — APPOINTMENT (OUTPATIENT)
Dept: OCCUPATIONAL THERAPY | Facility: CLINIC | Age: 84
DRG: 177 | End: 2024-05-12
Payer: COMMERCIAL

## 2024-05-12 ENCOUNTER — APPOINTMENT (OUTPATIENT)
Dept: PHYSICAL THERAPY | Facility: CLINIC | Age: 84
DRG: 177 | End: 2024-05-12
Payer: COMMERCIAL

## 2024-05-12 LAB
ANION GAP SERPL CALCULATED.3IONS-SCNC: 9 MMOL/L (ref 7–15)
BUN SERPL-MCNC: 14.4 MG/DL (ref 8–23)
CALCIUM SERPL-MCNC: 8.5 MG/DL (ref 8.8–10.2)
CHLORIDE SERPL-SCNC: 105 MMOL/L (ref 98–107)
CREAT SERPL-MCNC: 1.01 MG/DL (ref 0.67–1.17)
DEPRECATED HCO3 PLAS-SCNC: 24 MMOL/L (ref 22–29)
EGFRCR SERPLBLD CKD-EPI 2021: 74 ML/MIN/1.73M2
ERYTHROCYTE [DISTWIDTH] IN BLOOD BY AUTOMATED COUNT: 12.8 % (ref 10–15)
GLUCOSE SERPL-MCNC: 102 MG/DL (ref 70–99)
HCT VFR BLD AUTO: 42.1 % (ref 40–53)
HGB BLD-MCNC: 14.3 G/DL (ref 13.3–17.7)
MAGNESIUM SERPL-MCNC: 2.5 MG/DL (ref 1.7–2.3)
MCH RBC QN AUTO: 32.1 PG (ref 26.5–33)
MCHC RBC AUTO-ENTMCNC: 34 G/DL (ref 31.5–36.5)
MCV RBC AUTO: 94 FL (ref 78–100)
PLATELET # BLD AUTO: 151 10E3/UL (ref 150–450)
POTASSIUM SERPL-SCNC: 4.5 MMOL/L (ref 3.4–5.3)
RBC # BLD AUTO: 4.46 10E6/UL (ref 4.4–5.9)
SODIUM SERPL-SCNC: 138 MMOL/L (ref 135–145)
WBC # BLD AUTO: 6.9 10E3/UL (ref 4–11)

## 2024-05-12 PROCEDURE — 250N000011 HC RX IP 250 OP 636: Performed by: STUDENT IN AN ORGANIZED HEALTH CARE EDUCATION/TRAINING PROGRAM

## 2024-05-12 PROCEDURE — 36415 COLL VENOUS BLD VENIPUNCTURE: CPT | Performed by: STUDENT IN AN ORGANIZED HEALTH CARE EDUCATION/TRAINING PROGRAM

## 2024-05-12 PROCEDURE — 96372 THER/PROPH/DIAG INJ SC/IM: CPT | Performed by: STUDENT IN AN ORGANIZED HEALTH CARE EDUCATION/TRAINING PROGRAM

## 2024-05-12 PROCEDURE — 80048 BASIC METABOLIC PNL TOTAL CA: CPT | Performed by: STUDENT IN AN ORGANIZED HEALTH CARE EDUCATION/TRAINING PROGRAM

## 2024-05-12 PROCEDURE — 250N000013 HC RX MED GY IP 250 OP 250 PS 637

## 2024-05-12 PROCEDURE — 250N000013 HC RX MED GY IP 250 OP 250 PS 637: Performed by: INTERNAL MEDICINE

## 2024-05-12 PROCEDURE — 83735 ASSAY OF MAGNESIUM: CPT | Performed by: STUDENT IN AN ORGANIZED HEALTH CARE EDUCATION/TRAINING PROGRAM

## 2024-05-12 PROCEDURE — G0378 HOSPITAL OBSERVATION PER HR: HCPCS

## 2024-05-12 PROCEDURE — 97535 SELF CARE MNGMENT TRAINING: CPT | Mod: GO

## 2024-05-12 PROCEDURE — 85027 COMPLETE CBC AUTOMATED: CPT | Performed by: STUDENT IN AN ORGANIZED HEALTH CARE EDUCATION/TRAINING PROGRAM

## 2024-05-12 PROCEDURE — 99232 SBSQ HOSP IP/OBS MODERATE 35: CPT | Performed by: INTERNAL MEDICINE

## 2024-05-12 PROCEDURE — 250N000013 HC RX MED GY IP 250 OP 250 PS 637: Performed by: HOSPITALIST

## 2024-05-12 PROCEDURE — 97116 GAIT TRAINING THERAPY: CPT | Mod: GP | Performed by: PHYSICAL THERAPIST

## 2024-05-12 PROCEDURE — 120N000004 HC R&B MS OVERFLOW

## 2024-05-12 PROCEDURE — 99233 SBSQ HOSP IP/OBS HIGH 50: CPT | Performed by: STUDENT IN AN ORGANIZED HEALTH CARE EDUCATION/TRAINING PROGRAM

## 2024-05-12 PROCEDURE — 97110 THERAPEUTIC EXERCISES: CPT | Mod: GP | Performed by: PHYSICAL THERAPIST

## 2024-05-12 RX ORDER — MAGNESIUM SULFATE HEPTAHYDRATE 40 MG/ML
2 INJECTION, SOLUTION INTRAVENOUS ONCE
Status: COMPLETED | OUTPATIENT
Start: 2024-05-12 | End: 2024-05-12

## 2024-05-12 RX ORDER — ATENOLOL 25 MG/1
25 TABLET ORAL DAILY
Status: DISCONTINUED | OUTPATIENT
Start: 2024-05-12 | End: 2024-05-13

## 2024-05-12 RX ADMIN — DONEPEZIL HYDROCHLORIDE 5 MG: 5 TABLET, FILM COATED ORAL at 15:24

## 2024-05-12 RX ADMIN — CARBIDOPA AND LEVODOPA 2 TABLET: 25; 100 TABLET ORAL at 06:28

## 2024-05-12 RX ADMIN — CARBIDOPA AND LEVODOPA 2 TABLET: 25; 100 TABLET ORAL at 13:55

## 2024-05-12 RX ADMIN — MAGNESIUM SULFATE HEPTAHYDRATE 2 G: 40 INJECTION, SOLUTION INTRAVENOUS at 01:26

## 2024-05-12 RX ADMIN — TRAZODONE HYDROCHLORIDE 50 MG: 50 TABLET ORAL at 21:01

## 2024-05-12 RX ADMIN — CARBIDOPA AND LEVODOPA 2 TABLET: 25; 100 TABLET ORAL at 21:00

## 2024-05-12 RX ADMIN — NIRMATRELVIR AND RITONAVIR 3 TABLET: KIT at 10:51

## 2024-05-12 RX ADMIN — FINASTERIDE 5 MG: 5 TABLET, FILM COATED ORAL at 08:42

## 2024-05-12 RX ADMIN — ATENOLOL 25 MG: 25 TABLET ORAL at 10:41

## 2024-05-12 RX ADMIN — OLANZAPINE 2.5 MG: 2.5 TABLET, FILM COATED ORAL at 10:41

## 2024-05-12 RX ADMIN — ENOXAPARIN SODIUM 40 MG: 100 INJECTION SUBCUTANEOUS at 15:34

## 2024-05-12 RX ADMIN — NIRMATRELVIR AND RITONAVIR 3 TABLET: KIT at 20:57

## 2024-05-12 RX ADMIN — TAMSULOSIN HYDROCHLORIDE 0.4 MG: 0.4 CAPSULE ORAL at 20:57

## 2024-05-12 RX ADMIN — ASPIRIN 81 MG: 81 TABLET, COATED ORAL at 08:42

## 2024-05-12 ASSESSMENT — ACTIVITIES OF DAILY LIVING (ADL)
ADLS_ACUITY_SCORE: 62
ADLS_ACUITY_SCORE: 62
ADLS_ACUITY_SCORE: 53
ADLS_ACUITY_SCORE: 60
ADLS_ACUITY_SCORE: 59
ADLS_ACUITY_SCORE: 58
ADLS_ACUITY_SCORE: 53
ADLS_ACUITY_SCORE: 59
ADLS_ACUITY_SCORE: 56
ADLS_ACUITY_SCORE: 59
ADLS_ACUITY_SCORE: 53
ADLS_ACUITY_SCORE: 60
ADLS_ACUITY_SCORE: 53
ADLS_ACUITY_SCORE: 62
ADLS_ACUITY_SCORE: 53
ADLS_ACUITY_SCORE: 62
ADLS_ACUITY_SCORE: 52
ADLS_ACUITY_SCORE: 62
ADLS_ACUITY_SCORE: 56
ADLS_ACUITY_SCORE: 52
ADLS_ACUITY_SCORE: 62
ADLS_ACUITY_SCORE: 62
ADLS_ACUITY_SCORE: 60

## 2024-05-12 NOTE — SIGNIFICANT EVENT
RRT note: SWAT arrived at bedside. Pt is alert and talking to staff. Appears to be in no distress. No pain. Pt HR was 140-150's. BP was low post metoprolol X3 . [facility] arrived and ordered Adenosine 6 mg IV push. Pt was prepped for this with Crash cart and monitoring. Medication was successful with HR and rhythm stable.EKG post adenosine in the chart. Transferring patient to Cardiac Tele for further monitoring.     JASPER WILSON RN

## 2024-05-12 NOTE — PROGRESS NOTES
Pharmacy : Paxlovid Drug-Drug Interactions    Recommendations:  Monitor for hypotension with tamsulosin, do not exceed 0.4 mg/day. Patient currently on 0.4 mg daily dose, monitor blood pressure or hold dose through 5/17/24, 3 days after completing therapy.   Lower dose of trazodone should be considered. Patient currently on 50 mg, which is lower end of dosing. Risk of QT prolongation, monitor.  QTc 432 ms 5/11/24, not of concern.     Thank you, Liliane Matos [facility] 5/12/2024 10:58 AM

## 2024-05-12 NOTE — PROGRESS NOTES
"PRIMARY DIAGNOSIS: \"GENERIC\" NURSING  OUTPATIENT/OBSERVATION GOALS TO BE MET BEFORE DISCHARGE:  ADLs back to baseline: No    Activity and level of assistance: Up with maximum assistance. Consider SW and/or PT evaluation.     Pain status: Pain free.    Return to near baseline physical activity: No     Discharge Planner Nurse   Safe discharge environment identified: Yes  Barriers to discharge: Yes       Entered by: Rachel Mcarthur RN 05/12/2024 3:25 AM     Pt Aox2, disoriented to time/situation. Denied CP, SOB, N/V. C/o back aches, refused interventions, repositioning provided. Up with A:2, walker/gaitbelt. VSS on RA, bradycardic on tele 40's-50's. On K and mag protocols. Magnesium replaced around 1AM. PIV SL, patent and intact. Remains on 1:1 for impulsivity.     "

## 2024-05-12 NOTE — PROGRESS NOTES
"PRIMARY DIAGNOSIS: \"GENERIC\" NURSING  OUTPATIENT/OBSERVATION GOALS TO BE MET BEFORE DISCHARGE:  ADLs back to baseline: No    Activity and level of assistance: Up with maximum assistance - A:2 walker/gaitbelt  Pain status: Pain free.    Return to near baseline physical activity: No     Discharge Planner Nurse   Safe discharge environment identified: No  Barriers to discharge: Yes       Entered by: Rachel Mcarthur RN 05/12/2024 1:47 AM       "

## 2024-05-12 NOTE — SIGNIFICANT EVENT
"I agree with the documentation and findings as written by Rhonda Mirza MD and our discussed and formulated assessment and plan. I was present with Rhonda Mirza MD who participated in the service and documentation of the note. I have also personally verified the history and personally performed the physical exam and medical decision-making. I agree with the assessment and plan of care as documented in the note.    Liudmila Crabtree MD  Internal Medicine  [facility]  [facility]  Phone: #[phone]       Significant Event Note    Time of event: 1941    Description of event:  Rapid response was called due to ongoing patient tachycardia even with metoprolol. Dr. Crabtree and resident team arrived at bedside. Per chart review, patient was initially given metoprolol 5 mg IV at 1904, 1914, and 1929. In spite of this, SVTs continued with HR in the 140s. Patient was asymptomatic. He was treated with adenosine 6 mg IV x1 at 1958 with resolution of SVT.     /49   Pulse (!) 148   Temp 97.9  F (36.6  C) (Oral)   Resp 18   Ht 1.753 m (5' 9\")   Wt 88.8 kg (195 lb 12.3 oz)   SpO2 92%   BMI 28.91 kg/m      Plan:  S/p adenosine 6 mg IV push.  Patient successfully returned to NSR.   Hypotensive in the setting of metoprolol, 500 mL NS bolus ordered.  Continue to closely monitor.    Discussed with: bedside nurse and supervising physician, Dr. Liudmila Crabtree and patient.    Rhonda Mirza MD    Total time spent 40 min    Addendum:  Patient developed tachycardia with heart rate 140s to 150s around 1900. Pt's Nurse Titus Garcia contacted [facility] physician and cardiology right away.  IV metoprolol 5 mg x 3 given per cardiology recommendation.  Heart rate still remains elevated in 140s.  Patient was asymptomatic.  EKG showed SVT.  Rapid response was called.  IV adenosine 6 mg x 1 administered.  Patient converted to sinus rhythm and heart rate remains in 70s.  " Transferred to cardiac telemetry.  Electrolytes are checked.    Patient has positive COVID.  Patient's nurse was present the whole time in the patient's room.  She was not able to get out from the room frequently due to isolation.  She was communicating with the charge nurse through [place].  She handles the patient's condition very professionally.

## 2024-05-12 NOTE — PROGRESS NOTES
[facility]    Medicine Progress Note - Hospitalist Service    Date of Admission:  5/8/2024    Assessment & Plan      83 y/old Male is admitted to the floor for generalized weakness, COVID infection, and metabolic encephalopathy. Patient has past med. History of Parkinson Disease, HTN, BPH, SVT, and possible dementia.       Changes today:   -SVT overnight, cards discussing outpatient ablation  -Decreased atenolol to 25mg per cardiology    COVID-19 Infection/COVID-19 Pneumonia       - Paxlovid    2.   Metabolic Encephalopathy, Intermittent Delirium, Altered Mental Status        - Continue routine workup/labs to monitor for delirium.    3. SVT - patient had SVT event on 5/11/24, which initially was treated with metoprolol 5 mg IV 3 times, despite the infusions, HR was  in the 140s. Adenosine 6 mg IV x1 was then initiated, and that resolved the SVT.       - Patient uses atenolol to control BP, despite being on atenolol, patient went into SVT. Per Cardiology, patient may be considered for SVT ablation.     -  We appreciate cardiology's assistance/recommendations for this diagnosis.     4. Mildly elevated troponin     Mildly elevated proBNP on presentation, however, this is nonspecific.  -No clear signs of fluid overload on physical examination.  -Echocardiogram showed normal ejection fraction.  No wall motion abnormalities.     5. HTN       - Atenolol    6. Benign prostatic hyperplasia     -Continue home finasteride and tamsulosin     7. Parkinson's disease  Seizure-like activities.    -Patient has hand tremors, however, this morning(5/10) the patient nurse, Dedra,  noticed more rhythmic hand tremors and patient rolled his eyes. This lasted for a few seconds and he returned to baseline.  Lactic acid normal which goes against seizure. No postictal state per reports, no further episodes at this time. Question if patient could have been experiencing some bradycardia which triggered this brief  "event vs. SVT, no further episodes.     -Continue home carbidopa-levodopa  -Continue to monitor for any seizure-like activities, if patient develops any, we should consider CSF fluid sample to rule out meningitis  -If the patient develop fevers or worsening mental status, we should treat for possible meningitis/encephalitis.  -Continue to monitor for any seizure-like activities.  -Seizure precautions.  -Prolactin negative      8. Possible dementia  -Continue home Aricept    9. Bradycardia    10. Hx of SVT-is on atenolol at baseline 50 mg, placed on telemetry due to concern for seizure, telemetry picked up bradycardia down to 26 with some longer pauses.  He has known chronic bradycardia, previously his dose was attempted to be reduced to 25 mg from 50 unfortunately patient subsequently went back into SVT and thus his atenolol was then adjusted back to 50 mg.  Given his history of SVT with dose reductions of atenolol we will consult cardiology for their expertise.  -Cards consulted, appreciate recs  -PTA atenolol dose reduced to 25mg  -Discussing ablation with cardiology       Diet: Combination Diet Regular Diet Adult    DVT Prophylaxis: Enoxaparin (Lovenox) SQ  Felipe Catheter: Not present  Lines: None     Cardiac Monitoring: ACTIVE order. Indication: Tachyarrhythmias, acute (48 hours)  Code Status: No CPR- Do NOT Intubate                  # Drug Induced Platelet Defect: home medication list includes an antiplatelet medication   # Hypertension: Noted on problem list      # Overweight: Estimated body mass index is 28.19 kg/m  as calculated from the following:    Height as of this encounter: 1.753 m (5' 9\").    Weight as of this encounter: 86.6 kg (190 lb 14.7 oz).              Disposition Plan     Patient's SVT episode on 5/11/24 prevented patient from being discharged, so further cardiac monitoring and workup is needed.    Medically Ready for Discharge: Anticipated in 2-4 Days      The patient's care was discussed " "with the Attending Physician, Dr. Ariel Connell .    [facility]  Hospitalist Service  [facility]  Securely message with [employer] (more info)  Text page via [facility] Paging/Directory   ______________________________________________________________________    Interval History     Patient this morning was interviewed at bedside this morning. Patient was mildly confused and a bit agitated stating that \"he would like to get up\". Patient states he is feeling fine physically, and states he felt good overnight. Patient denies chest pain, dizziness, lightheadedness, dyspnea, cough, N/V, headache, abdominal pain, or dysuria.     Physical Exam   Vital Signs: Temp: 98.1  F (36.7  C) Temp src: Oral BP: 116/58 Pulse: 68   Resp: 22 SpO2: 90 % O2 Device: None (Room air)    Weight: 190 lbs 14.69 oz    Gen:Well appearing, well nourished, in no acute distress  HEENT: NC/AT, MMM, RAY, EOMI, Supple  CV: RRR, normal s1, normal s2, no m/r/g  Resp: CTAB, normal I/E effort, no additional respiratory sounds  Abd: +BS, non-tender, non-distended, no guarding or rebound tenderness  Ext:No significant deformities or trauma, moving all ext freely  Skin: No erythema, no lesions or rashes.   Neuro:No focal neurologic deficit, AxOx2. Strength and sensation grossly intact  Psych: Pleasant, answering questions appropriately, normal mood/affect. Insight good, judgement intact.      60 MINUTES SPENT BY ME on the date of service doing chart review, history, exam, documentation & further activities per the note.     Data     I have personally reviewed the following data over the past 24 hrs:    6.9  \   14.3   / 151     138 105 14.4 /  102 (H)   4.5 24 1.01 \     ALT: <5 AST: 28 AP: 58 TBILI: 0.5   ALB: 3.7 TOT PROTEIN: 6.6 LIPASE: N/A       Imaging results reviewed over the past 24 hrs:   No results found for this or any previous visit (from the past 24 hour(s)).    Ariel Connell MD  Hospitalist         Physician Attestation   I, " Ariel Connell MD, was present with the medical/GIOVANNI student who participated in the service and in the documentation of the note.  I have verified the history and personally performed the physical exam and medical decision making.  I agree with the assessment and plan of care as documented in the note.      Key findings: SVT overnight, mentation waxes and wanes, updated family at bedside    Please see A&P for additional details of medical decision making.    I have personally reviewed the following data over the past 24 hrs:    6.9  \   14.3   / 151     138 105 14.4 /  102 (H)   4.5 24 1.01 \     ALT: <5 AST: 28 AP: 58 TBILI: 0.5   ALB: 3.7 TOT PROTEIN: 6.6 LIPASE: N/A         Ariel Connell MD  Date of Service (when I saw the patient): 05/12/24

## 2024-05-12 NOTE — PROGRESS NOTES
"    Cardiology Progress Note    Assessment:    Sinus bradycardia with short sinus pauses(less than 3 seconds) consistent with sinus node dysfunction, asymptomatic-potentially aggravated by atenolol and current COVID-19 infection/treatment-resolved  PSVT with short RP interval, new episode last night terminated with adenosine  COVID-19 infection  Parkinson's disease  Cognitive impairment    Plan:  Continue reduced dose of atenolol 25 mg a day  SVT ablation was discussed with patient and his family again.  He is now open to the idea of outpatient ablation.  Will make arrangements for EP consult  Can be discharged home if no significant bradycardia noted  Subjective:   Had SVT last night, converted with adenosine.  Denies chest pain or shortness of breath this morning    Objective:   BP (!) 164/73 (BP Location: Left arm)   Pulse 59   Temp 98.1  F (36.7  C) (Oral)   Resp 22   Ht 1.753 m (5' 9\")   Wt 86.6 kg (190 lb 14.7 oz)   SpO2 92%   BMI 28.19 kg/m      Intake/Output Summary (Last 24 hours) at 5/12/2024 1026  Last data filed at 5/12/2024 0957  Gross per 24 hour   Intake 420 ml   Output 1240 ml   Net -820 ml         Physical Exam:  GENERAL: no distress  NECK: No JVD  LUNGS: Clear to auscultation.  CARDIAC: regular  rhythm, S1 & S2 normal.  No heaves, thrills, gallops or murmurs.  ABDOMEN: flat, negative hepatosplenomegaly, soft and non-tender.  EXTREMITIES: No evidence of cyanosis, clubbing or edema.    Current Facility-Administered Medications Ordered in Epic   Medication Dose Route Frequency Provider Last Rate Last Admin     acetaminophen (TYLENOL) tablet 650 mg  650 mg Oral Q4H PRN Haleigh Lamas PA-C   650 mg at 05/10/24 0307    Or     acetaminophen (TYLENOL) Suppository 650 mg  650 mg Rectal Q4H PRN Haleigh Lamas PA-C         aspirin EC tablet 81 mg  81 mg Oral Daily Haleigh Lamas PA-C   81 mg at 05/12/24 0842     atenolol (TENORMIN) tablet 25 mg  25 mg Oral Daily Yobany Navarro MD         calcium " carbonate (TUMS) chewable tablet 1,000 mg  1,000 mg Oral 4x Daily PRN Haleigh Lamas PA-C         carbidopa-levodopa (SINEMET)  MG per tablet 2 tablet  2 tablet Oral TID Haleigh Lamas PA-C   2 tablet at 05/12/24 0628     donepezil (ARICEPT) tablet 5 mg  5 mg Oral Daily Haleigh Lamas PA-C   5 mg at 05/11/24 1010     enoxaparin ANTICOAGULANT (LOVENOX) injection 40 mg  40 mg Subcutaneous Q24H Tristan Kwong MD   40 mg at 05/11/24 1731     finasteride (PROSCAR) tablet 5 mg  5 mg Oral QAM Haleigh Lamas PA-C   5 mg at 05/12/24 0842     lidocaine (LMX4) cream   Topical Q1H PRN Haleigh Lamas PA-C         lidocaine 1 % 0.1-1 mL  0.1-1 mL Other Q1H PRN Haleigh Lamas PA-C         metoprolol (LOPRESSOR) injection 5 mg  5 mg Intravenous Q5 Min PRN Landon Mcelroy MD   5 mg at 05/11/24 1929     nirmatrelvir and ritonavir (PAXLOVID) 300 mg/100 mg therapy pack 3 tablet  3 tablet Oral BID Tristan Kwong MD   3 tablet at 05/11/24 2029     OLANZapine (zyPREXA) injection 2.5 mg  2.5 mg Intramuscular Daily PRN Bradley Roy DO         OLANZapine (zyPREXA) tablet 2.5 mg  2.5 mg Oral BID PRN Bradley Roy DO   2.5 mg at 05/09/24 1652     ondansetron (ZOFRAN ODT) ODT tab 4 mg  4 mg Oral Q6H PRN Haleigh Lamas PA-C        Or     ondansetron (ZOFRAN) injection 4 mg  4 mg Intravenous Q6H PRN Haleigh Lamas PA-C         senna-docusate (SENOKOT-S/PERICOLACE) 8.6-50 MG per tablet 1 tablet  1 tablet Oral BID PRN Haleigh Lamas PA-C        Or     senna-docusate (SENOKOT-S/PERICOLACE) 8.6-50 MG per tablet 2 tablet  2 tablet Oral BID PRN Haleigh Lamas PA-C         sodium chloride (PF) 0.9% PF flush 3 mL  3 mL Intracatheter Q8H Haleigh Lamas PA-C   3 mL at 05/12/24 0637     sodium chloride (PF) 0.9% PF flush 3 mL  3 mL Intracatheter q1 min prn Haleigh Lamas PA-C         tamsulosin (FLOMAX) capsule 0.4 mg  0.4 mg Oral QPM Haleigh Lamas PA-C   0.4 mg at 05/11/24 2029     traZODone (DESYREL) tablet 50 mg  50 mg Oral At Bedtime  "Haleigh Lamas PA-C   50 mg at 05/11/24 2226     No current Epic-ordered outpatient medications on file.       Cardiographics:    Telemetry: Normal sinus rhythm, no pauses  ECG: SVT with short RP interval    ECHO (personnaly Reviewed):   1. Normal left ventricular size and systolic performance with a visually  estimated ejection fraction of 60%.  2. There is trace aortic insufficiency.  3. Normal right ventricular size and systolic performance.  4. There is mild left atrial enlargement.  5. There is mild enlargement of the proximal ascending aorta       Lab Results    Chemistry/lipid CBC Cardiac Enzymes/BNP/TSH/INR   No results for input(s): \"CHOL\", \"HDL\", \"LDL\", \"TRIG\", \"CHOLHDLRATIO\" in the last 12365 hours.  No results for input(s): \"LDL\" in the last 85115 hours.  Recent Labs   Lab Test 05/12/24  0836      POTASSIUM 4.5   CHLORIDE 105   CO2 24   *   BUN 14.4   CR 1.01   GFRESTIMATED 74   EMILE 8.5*     Recent Labs   Lab Test 05/12/24  0836 05/11/24  1919 05/10/24  0843   CR 1.01 1.09 1.03     No results for input(s): \"A1C\" in the last 88554 hours.       Recent Labs   Lab Test 05/12/24  0837   WBC 6.9   HGB 14.3   HCT 42.1   MCV 94        Recent Labs   Lab Test 05/12/24  0837 05/11/24  1919 05/10/24  0843   HGB 14.3 14.1 13.3    Recent Labs   Lab Test 06/15/22  1415 06/15/22  1151   TROPONINI <0.01 <0.01     Recent Labs   Lab Test 05/08/24  1841 06/15/22  1151   BNP  --  61   NTBNPI 1,482  --      Recent Labs   Lab Test 01/31/24  1220   TSH 2.98     No results for input(s): \"INR\" in the last 01714 hours.                "

## 2024-05-12 NOTE — PLAN OF CARE
Problem: Cognitive Impairment  Goal: Optimal Cognitive Function  Outcome: Not Progressing  Intervention: Optimize Cognitive Function  Recent Flowsheet Documentation  Taken 5/12/2024 1600 by Angelica Guerra, RN  Sensory Stimulation Regulation: quiet environment promoted     Problem: Infection  Goal: Absence of Infection Signs and Symptoms  Outcome: Progressing  Intervention: Prevent or Manage Infection  Recent Flowsheet Documentation  Taken 5/12/2024 1600 by Angelica Guerra RN  Isolation Precautions: special precautions maintained   Goal Outcome Evaluation:  Pt calm, oriented to self and daughter; unsure where he is, situation or date.  Repeatedly asking daughter how long he'd been in this bed and when he was last home. Impulsive and does attempt to stand up from chair unassisted; has 1:1 PCA with him.  Pt denies SOB, but is afebrile; remains in Special Precautions isolation d/t being + COVID infection. Treating with paxlovid, monitoring respiratory and cardiac status closely.  Daughter [place] repeatedly asking why her father can't be discharged to his facility now; attempt to educate regarding arrhythmia, cardiac medication changes and importance of monitoring heart closely.  Also instructed pt being treated for Covid infection, trying to stress the importance of close monitoring for potential worsening status.

## 2024-05-12 NOTE — SIGNIFICANT EVENT
Significant Event Note    Paged by RN regarding onset of SVT. Hemodynamically stable. HR around 150 bpm. Advised attempts with vagal maneuvers and placed order for IV metoprolol. If this doesn't work, would need rapid response called and consider IV adenosine. I am not in-house and am not immediately available to assess the patient.    Landon Mcelroy MD

## 2024-05-12 NOTE — PROGRESS NOTES
"Writer was on break when notified by monitor [facility] that patient went into Sinus tachy with HR of 140 at 1825. Writer checked with 1:1 sitter and sitter stated that patient is talking and eating. Charge RN Neal checked on patient while this writer is making her way back down to the unit to assess patient herself. Charge RN  was also on the phone at that time with  3N charge nurse regarding HR of 140 and she suggested to reach out to cardiology for recommendation. This writer assess patient at 1930. Patient is asymptomatic, denies SOB, chest pain, nausea or vomiting. /86 RR 18 and 92% on RA . Patient does not appear in acute distress, alert and talking. Cardiology page at 1835. While waiting for cardiology to call back . Remote MD Small notifed of HR at 1840 per order.   At 1841 cardiology called back and I received a telephone order from Dr. Dorman to try IV metoprolol and valsalva maneuvers first and if it does not work to let  them know.     At 1843 Dr. Small was notified that cardiology will place order for IV metoprolol.     At 1850, writer received call from in-house MD, Dr. Crabtree. Writer informed MD of Cardiologist plan of action and she agreed with the plan. At between 8004-7800 Writer read vocnamrata page from [place]  sent at 1844 asking why are you not considering RRT and at at 1849 monica again with his plan of action \"Plans - Informed RN that remote cross cover calls on patient with SVT is not appropriate. This patient will benefit from onsite MD evaluation or calling RRT given potential to deteriorate rapidly. I will add labs to check electrolytes. I am not able to provide remote cross cover recommendations at this point\"  Writer responded,\"Patient is asymptomatic and does not appear to be in acute distress. He also has a known history of SVT. I have already paged cardiology before I notify you. I notified you because there is a parameter order to notify MD if HR is above 120. If " patient was symptomatic I would have absolutely called RRT. Thank you.    1368-1239: Dr. Crabtree came at bedside and laid eyes on patient. Writer made MD aware that IV metoprolol X3 did not work and that patient's HR is still in the 140s. MD discussed plan to page cardiology again and that she will order adenosine.    1923. Cardiology paged again    1926 Cardiology called back and recommended adenosine or call rapid response.    8125-8692. RRT was called. ICU RN and Swat RN administered adenosine. Patient HR stabilize to NSR. Patient's remain asymptomatic, alert. VSS. Transferred to 3N.     RN remained in patient room from 1900- 2010.    1:1 present in room for the whole duration of this writer shift.

## 2024-05-12 NOTE — UTILIZATION REVIEW
[facility] Utilization Review  Admission Status; Secondary Review Determination     Admission Date: 5/8/2024  6:13 PM      Under the authority of the Utilization Management Committee, the utilization review process indicated a secondary review on the above patient.  The review outcome is based on review of the medical records, discussions with staff, and applying clinical experience noted on the date of the review.        (X)      Inpatient Status Appropriate - This patient's medical care is consistent with medical management for inpatient care and reasonable inpatient medical practice.          RATIONALE FOR DETERMINATION   83-year-old male with history of hypertension, BPH, Parkinson's disease admitted with CHF, generalized weakness, COVID infection, on Paxlovid.  Patient also found to have metabolic encephalopathy with SVT overnight, reduced atenolol dose, episode of SVT overnight, cardiology team discussing SVT ablation patient is agreeable with outpatient ablation, and okay to discharge home if no significant bradycardia noted.  Patient received adenosine IV push and successfully returned to normal sinus rhythm, became hypotensive, received IV fluid bolus.  Echocardiogram showed normal EF, no wall motion abnormalities, patient has episodes of sinus bradycardia with short sinus pauses consistent with sinus node dysfunction, likely aggravated by atenolol with current COVID-19 infection, and recommend SVT ablation as outpatient, but needs close monitoring in the hospital with episodes of bradycardia, with anticipated length of stay more than 2 midnight and ongoing interventions, requiring close monitoring in the hospital, recommend change to inpatient status, communicated to Dr. Connell      The severity of illness, intensity of service provided, expected LOS and risk for adverse outcome make the care complex, high risk and appropriate for hospital admission.The patient requires hospital based medical  care which is anticipated to require a stay of 2 or more midnights; according to CMS guidelines the patient should be admitted as inpatient        The information on this document is developed by the utilization review team in order for the business office to ensure compliance.  This only denotes the appropriateness of proper admission status and does not reflect the quality of care rendered.         The definitions of Inpatient Status and Observation Status used in making the determination above are those provided in the CMS Coverage Manual, Chapter 1 and Chapter 6, section 70.4.      Sincerely,       Melinda Melo MD  Physician Advisor  Utilization Review-[place]    Phone: [phone]

## 2024-05-13 VITALS
RESPIRATION RATE: 19 BRPM | OXYGEN SATURATION: 94 % | SYSTOLIC BLOOD PRESSURE: 138 MMHG | DIASTOLIC BLOOD PRESSURE: 61 MMHG | HEART RATE: 65 BPM | WEIGHT: 190.92 LBS | HEIGHT: 69 IN | BODY MASS INDEX: 28.28 KG/M2 | TEMPERATURE: 97.6 F

## 2024-05-13 LAB
BACTERIA BLD CULT: NO GROWTH
BACTERIA BLD CULT: NO GROWTH
MAGNESIUM SERPL-MCNC: 2.2 MG/DL (ref 1.7–2.3)
POTASSIUM SERPL-SCNC: 4 MMOL/L (ref 3.4–5.3)

## 2024-05-13 PROCEDURE — 250N000013 HC RX MED GY IP 250 OP 250 PS 637: Performed by: INTERNAL MEDICINE

## 2024-05-13 PROCEDURE — 99239 HOSP IP/OBS DSCHRG MGMT >30: CPT | Performed by: STUDENT IN AN ORGANIZED HEALTH CARE EDUCATION/TRAINING PROGRAM

## 2024-05-13 PROCEDURE — 99233 SBSQ HOSP IP/OBS HIGH 50: CPT | Performed by: INTERNAL MEDICINE

## 2024-05-13 PROCEDURE — 250N000013 HC RX MED GY IP 250 OP 250 PS 637

## 2024-05-13 PROCEDURE — 84132 ASSAY OF SERUM POTASSIUM: CPT | Performed by: STUDENT IN AN ORGANIZED HEALTH CARE EDUCATION/TRAINING PROGRAM

## 2024-05-13 PROCEDURE — 83735 ASSAY OF MAGNESIUM: CPT | Performed by: STUDENT IN AN ORGANIZED HEALTH CARE EDUCATION/TRAINING PROGRAM

## 2024-05-13 PROCEDURE — 36415 COLL VENOUS BLD VENIPUNCTURE: CPT | Performed by: STUDENT IN AN ORGANIZED HEALTH CARE EDUCATION/TRAINING PROGRAM

## 2024-05-13 RX ORDER — ATENOLOL 25 MG/1
37.5 TABLET ORAL DAILY
DISCHARGE
Start: 2024-05-14

## 2024-05-13 RX ADMIN — CARBIDOPA AND LEVODOPA 2 TABLET: 25; 100 TABLET ORAL at 06:16

## 2024-05-13 RX ADMIN — ASPIRIN 81 MG: 81 TABLET, COATED ORAL at 08:34

## 2024-05-13 RX ADMIN — FINASTERIDE 5 MG: 5 TABLET, FILM COATED ORAL at 08:34

## 2024-05-13 RX ADMIN — NIRMATRELVIR AND RITONAVIR 3 TABLET: KIT at 08:35

## 2024-05-13 RX ADMIN — DONEPEZIL HYDROCHLORIDE 5 MG: 5 TABLET, FILM COATED ORAL at 08:34

## 2024-05-13 RX ADMIN — ATENOLOL 25 MG: 25 TABLET ORAL at 08:35

## 2024-05-13 RX ADMIN — CARBIDOPA AND LEVODOPA 2 TABLET: 25; 100 TABLET ORAL at 12:50

## 2024-05-13 ASSESSMENT — ACTIVITIES OF DAILY LIVING (ADL)
ADLS_ACUITY_SCORE: 53
ADLS_ACUITY_SCORE: 58
ADLS_ACUITY_SCORE: 58
ADLS_ACUITY_SCORE: 53
ADLS_ACUITY_SCORE: 53
ADLS_ACUITY_SCORE: 58
ADLS_ACUITY_SCORE: 59
ADLS_ACUITY_SCORE: 53
ADLS_ACUITY_SCORE: 58
DEPENDENT_IADLS:: CLEANING;COOKING;LAUNDRY;SHOPPING;MEAL PREPARATION;MEDICATION MANAGEMENT;MONEY MANAGEMENT;TRANSPORTATION
ADLS_ACUITY_SCORE: 58
ADLS_ACUITY_SCORE: 53
ADLS_ACUITY_SCORE: 53

## 2024-05-13 NOTE — PROGRESS NOTES
Occupational Therapy Discharge Summary    Reason for therapy discharge:    Discharged to long term care facility.    Progress towards therapy goal(s). [facility] for goal details.  Goals not met.  Barriers to achieving goals:   discharge from facility.    Therapy recommendation(s):    Continued therapy is recommended.  Rationale/Recommendations:  continue to improve activity tolerance and safety for ADL..

## 2024-05-13 NOTE — PROGRESS NOTES
"  HEART CARE CONSULTATON NOTE        Assessment/Recommendations   Assessment:   SVT likely reentry tachycardia.  Converted with adenosine.  Sinus node dysfunction, sinus bradycardia, asymptomatic  3.  COVID-19 infection  4.  Parkinson's disease    Plan:   Atenolol 37.5 mg daily  Outpatient EP consultation for possible ablation.  Discussed with Daughter    Primary cardiologist: Dr. Main    Patient stable from a cardiovascular standpoint for discharge.  Patient has appoint with electrophysiologist on 6/3/2024.      Clinically Significant Risk Factors                    # Hypertension: Noted on problem list          # Overweight: Estimated body mass index is 28.19 kg/m  as calculated from the following:    Height as of this encounter: 1.753 m (5' 9\").    Weight as of this encounter: 86.6 kg (190 lb 14.7 oz).                   History of Present Illness/Subjective    S: Asymptomatic bradycardia noted on telemetry overnight.  Heart rates in the mid 40s at times with no symptoms.  No significant pauses.  No sustained atrial or ventricular arrhythmias.  Discussed with family in detail regarding atenolol dosing.  Will adjust atenolol to 37.5 mg daily.  Will have EP consultation performed to consider ablation as family would like to consider given intermittent episodes of tachycardia difficult control with atenolol in the setting of sinus bradycardia.    Covering well from COVID-19 infection       Physical Examination     VITALS: /61 (BP Location: Left arm)   Pulse 65   Temp 97.6  F (36.4  C) (Oral)   Resp 19   Ht 1.753 m (5' 9\")   Wt 86.6 kg (190 lb 14.7 oz)   SpO2 94%   BMI 28.19 kg/m    BMI: Body mass index is 28.19 kg/m .  Wt Readings from Last 3 Encounters:   05/12/24 86.6 kg (190 lb 14.7 oz)   06/15/22 85.7 kg (189 lb)   03/03/22 85.7 kg (189 lb)       Intake/Output Summary (Last 24 hours) at 5/13/2024 1139  Last data filed at 5/13/2024 1114  Gross per 24 hour   Intake 720 ml   Output 1025 ml   Net -305 " "ml     General Appearance:   no distress, normal body habitus   ENT/Mouth: membranes moist, no oral lesions or bleeding gums.      EYES:  no scleral icterus, normal conjunctivae   Neck: no carotid bruits or thyromegaly   Chest/Lungs:   lungs are clear to auscultation, no rales    Cardiovascular:   Regular.    Abdomen:  no organomegaly, masses, bruits, or tenderness; bowel sounds are present   Extremities: no cyanosis or clubbing   Skin: no xanthelasma, warm.    Neurologic: normal  bilateral, + tremors     Psychiatric: alert , calm           Lab Results    Chemistry/lipid CBC Cardiac Enzymes/BNP/TSH/INR   No results for input(s): \"CHOL\", \"HDL\", \"LDL\", \"TRIG\", \"CHOLHDLRATIO\" in the last 63175 hours.  No results for input(s): \"LDL\" in the last 11657 hours.  Recent Labs   Lab Test 05/13/24  0616 05/12/24  0836   NA  --  138   POTASSIUM 4.0 4.5   CHLORIDE  --  105   CO2  --  24   GLC  --  102*   BUN  --  14.4   CR  --  1.01   GFRESTIMATED  --  74   EMILE  --  8.5*     Recent Labs   Lab Test 05/12/24  0836 05/11/24  1919 05/10/24  0843   CR 1.01 1.09 1.03     No results for input(s): \"A1C\" in the last 36918 hours.       Recent Labs   Lab Test 05/12/24  0837   WBC 6.9   HGB 14.3   HCT 42.1   MCV 94        Recent Labs   Lab Test 05/12/24  0837 05/11/24  1919 05/10/24  0843   HGB 14.3 14.1 13.3    Recent Labs   Lab Test 06/15/22  1415 06/15/22  1151   TROPONINI <0.01 <0.01     Recent Labs   Lab Test 05/08/24  1841 06/15/22  1151   BNP  --  61   NTBNPI 1,482  --      Recent Labs   Lab Test 01/31/24  1220   TSH 2.98     No results for input(s): \"INR\" in the last 33260 hours.     Medical History  Surgical History Family History Social History   Past Medical History:   Diagnosis Date     Colon polyp      Cough      Depression      Diverticulitis      Head injury     fell working in garden     Hearing loss      Hemorrhoids      HTN (hypertension)      Sinus drainage     coughing spells?     Past Surgical History: "   Procedure Laterality Date     ESOPHAGOSCOPY, GASTROSCOPY, DUODENOSCOPY (EGD), COMBINED  4/21/2014    Procedure: Gastroscopy ;  Surgeon: Bradley Colmenares MD;  Location: [facility]     HERNIA REPAIR, INGUINAL RT/LT       ZZC APPENDECTOMY       Family History   Problem Relation Age of Onset     C.A.D. Father      Cancer Brother      Neurologic Disorder Brother         parkinsons        Social History     Socioeconomic History     Marital status:      Spouse name: Not on file     Number of children: Not on file     Years of education: Not on file     Highest education level: Not on file   Occupational History     Not on file   Tobacco Use     Smoking status: Former     Smokeless tobacco: Never   Substance and Sexual Activity     Alcohol use: Yes     Comment: every day     Drug use: No     Sexual activity: Not on file   Other Topics Concern     Parent/sibling w/ CABG, MI or angioplasty before 65F 55M? Not Asked   Social History Narrative     Not on file     Social Determinants of Health     Financial Resource Strain: Low Risk  (2/28/2023)    Received from [facility], [facility]    Financial Resource Strain      Difficulty of Paying Living Expenses: 3      Difficulty of Paying Living Expenses: Not on file   Food Insecurity: No Food Insecurity (2/28/2023)    Received from [facility], [facility]    Food Insecurity      Worried About Running Out of Food in the Last Year: 1   Transportation Needs: No Transportation Needs (2/28/2023)    Received from [facility], [facility]    Transportation Needs      Lack of Transportation (Medical): 1   Physical Activity: Not on file   Stress: Not on file   Social Connections: Unknown (3/1/2024)    Received from [facility]    Social Connections       Frequency of Communication with Friends and Family: Not on file   Interpersonal Safety: Not on file   Housing Stability: Low Risk  (2/28/2023)    Received from [facility], [facility]    Housing Stability      Unable to Pay for Housing in the Last Year: 1         Medications  Allergies   No current outpatient medications on file.        Allergies   Allergen Reactions     Nka [No Known Allergies]          Migue Sarmiento DO

## 2024-05-13 NOTE — PLAN OF CARE
Problem: Risk for Delirium  Goal: Improved Behavioral Control  Outcome: Progressing     Problem: Risk for Delirium  Goal: Improved Sleep  Outcome: Progressing     Problem: Cognitive Impairment  Goal: Optimal Cognitive Function  Intervention: Optimize Cognitive Function  Recent Flowsheet Documentation  Taken 5/13/2024 0000 by Rachel Mcarthur, RN  Sensory Stimulation Regulation:   care clustered   lighting decreased   music on   quiet environment promoted  Taken 5/12/2024 2000 by Rachel Mcarthur, RN  Sensory Stimulation Regulation:   care clustered   lighting decreased   music on   quiet environment promoted   Goal Outcome Evaluation:         Pt disoriented to place and situation overnight. Mental status fluctuates, currently Aox4 in AM. Intermittent c/o back pain, when offering pain meds, pt refused. Repositioning done for comfort. Resting well overnight. 1:1 sitter removed at 0300, Pt resting well overnight. VSS on RA. SB w/ slight BBB on tele. Increased visualization of Pt. Purposeful rounding done as well.

## 2024-05-13 NOTE — CONSULTS
Care Management Discharge Note    Discharge Date: 05/13/2024       Discharge Disposition:  ([facility])    Discharge Services: Transportation Services    Discharge DME: None    Discharge Transportation: family or friend will provide    Private pay costs discussed: Not applicable    Does the patient's insurance plan have a 3 day qualifying hospital stay waiver?  No    PAS Confirmation Code:    Patient/family educated on Medicare website which has current facility and service quality ratings:      Education Provided on the Discharge Plan: Yes (AVS per bedside RN)  Persons Notified of Discharge Plans: pt, daughter and [facility]  Patient/Family in Agreement with the Plan: unable to assess    Handoff Referral Completed: Yes    Additional Information:  Pt to discharge back to [facility]. Daughter to transport back to home.     Spoke with daughter on the phone, introduced self and CM role. Pt lives at [facility] with his wife. Pt is independent with ADLs and receives assistance with IADLs at the facility he lives at.     Daughter to drive pt back to [facility].      Samir Sebastian RN

## 2024-05-13 NOTE — PROGRESS NOTES
Patient discharged with daughter back to assisted living. Packet sent with daughter, IV out, tele off, all belongings sent home with patient.  Marlen Sheriff RN on 5/13/2024 at 1:09 PM

## 2024-05-14 ENCOUNTER — PATIENT OUTREACH (OUTPATIENT)
Dept: CARE COORDINATION | Facility: CLINIC | Age: 84
End: 2024-05-14
Payer: COMMERCIAL

## 2024-05-14 LAB
ATRIAL RATE - MUSE: 156 BPM
ATRIAL RATE - MUSE: 54 BPM
ATRIAL RATE - MUSE: 82 BPM
DIASTOLIC BLOOD PRESSURE - MUSE: NORMAL MMHG
INTERPRETATION ECG - MUSE: NORMAL
P AXIS - MUSE: 25 DEGREES
P AXIS - MUSE: 28 DEGREES
P AXIS - MUSE: NORMAL DEGREES
PR INTERVAL - MUSE: 154 MS
PR INTERVAL - MUSE: 166 MS
PR INTERVAL - MUSE: NORMAL MS
QRS DURATION - MUSE: 112 MS
QRS DURATION - MUSE: 118 MS
QRS DURATION - MUSE: 120 MS
QT - MUSE: 326 MS
QT - MUSE: 370 MS
QT - MUSE: 476 MS
QTC - MUSE: 432 MS
QTC - MUSE: 451 MS
QTC - MUSE: 518 MS
R AXIS - MUSE: -64 DEGREES
R AXIS - MUSE: -65 DEGREES
R AXIS - MUSE: -69 DEGREES
SYSTOLIC BLOOD PRESSURE - MUSE: NORMAL MMHG
T AXIS - MUSE: -21 DEGREES
T AXIS - MUSE: 106 DEGREES
T AXIS - MUSE: 46 DEGREES
VENTRICULAR RATE- MUSE: 152 BPM
VENTRICULAR RATE- MUSE: 54 BPM
VENTRICULAR RATE- MUSE: 82 BPM

## 2024-05-14 PROCEDURE — 93010 ELECTROCARDIOGRAM REPORT: CPT | Performed by: INTERNAL MEDICINE

## 2024-05-14 NOTE — DISCHARGE SUMMARY
"[facility]  Hospitalist Discharge Summary      Date of Admission:  5/8/2024  Date of Discharge:  5/13/2024  1:01 PM  Discharging Provider: Ariel Connell MD  Discharge Service: Hospitalist Service    Discharge Diagnoses   COVID-19  Metabolic encephalopathy  SVT  History of Parkinson's disease  Bradycardia    Clinically Significant Risk Factors     # Overweight: Estimated body mass index is 28.19 kg/m  as calculated from the following:    Height as of this encounter: 1.753 m (5' 9\").    Weight as of this encounter: 86.6 kg (190 lb 14.7 oz).       Follow-ups Needed After Discharge   Follow-up Appointments     Follow Up and recommended labs and tests      Follow up with Nursing home physician.    Follow up with cardiology consider outpatient ablation        {Additional follow-up instructions/to-do's for PCP   N/A    Unresulted Labs Ordered in the Past 30 Days of this Admission       No orders found from 4/8/2024 to 5/9/2024.        These results will be followed up by n/a    Discharge Disposition   Discharged to home  Condition at discharge: Fair    Hospital Course   83 y/old Male is admitted to the floor for generalized weakness, COVID infection, and metabolic encephalopathy. Patient has past med. History of Parkinson Disease, HTN, BPH, SVT, and possible dementia.   Hospital stay was significant for COVID-19 infection and was started on Paxlovid and will finish a full course of this.  He did have a brief course of metabolic encephalopathy/intermittent delirium which resolved without intervention.  He does have a history of both bradycardia and SVT this was picked up on telemetry and thus plans to decrease his atenolol from 50 mg to 25 mg were discussed as he bradycardia down to the 20s with a less than 3-second pause.  Cardiology was consulted, unfortunately patient developed SVT for any dose reductions were made which is initially attempted to be treated with metoprolol IV x 3 without good " control and thus adenosine was given with conversion to normal sinus rhythm at that time.  He had not received a reduced dose of his atenolol at that time however cardiology still felt comfortable decreasing his atenolol to 37.5 mg from 50.  It is known that he has a history of going into SVT most notably when his atenolol was reduced from 50 to 25 mg due to bradycardia he unfortunately had to put him back on 50 mg of atenolol due to recurrent SVT.  Cardiology discussed and felt that he would be stable for outpatient follow-up and consideration of ablation.  They did not feel as though an inpatient ablation was indicated or necessary.  Given this he was discharged without any episodes of SVT for over 24 hours.  He should follow-up with cardiology.    Consultations This Hospital Stay   PHYSICAL THERAPY ADULT IP CONSULT  OCCUPATIONAL THERAPY ADULT IP CONSULT  CARE MANAGEMENT / SOCIAL WORK IP CONSULT  CARDIOLOGY IP CONSULT  PHYSICAL THERAPY ADULT IP CONSULT  OCCUPATIONAL THERAPY ADULT IP CONSULT  CARE MANAGEMENT / SOCIAL WORK IP CONSULT    Code Status   No CPR- Do NOT Intubate    Time Spent on this Encounter   I, Ariel Connell MD, personally saw the patient today and spent greater than 30 minutes discharging this patient.       Ariel Connell MD  [facility]  [address] [address]  Phone: [phone]  Fax: [phone]  ______________________________________________________________________    Physical Exam   Vital Signs:                    Weight: 190 lbs 14.69 oz  Gen: Appears well, NAD, on RA  Card:Warm well perfused, pulses assumed patient talking  Pulm: Normal I/E effort on RA  Abd:Non distended  Skin:No obvious rashes or lesions on exposed areas of skin  Neuro AxOx4, S/S grossly intact, no obvious FND,   Psych:Pleasant, answering questions appropriately, insight good, judgement good, does not appear to be responding to I/E stimuli        Primary Care  Physician   No primary care provider on file.    Discharge Orders      General info for SNF    Length of Stay Estimate: Short Term Care: Estimated # of Days <30  Condition at Discharge: Improving  Level of care:skilled   Rehabilitation Potential: Good  Admission H&P remains valid and up-to-date: Yes  Recent Chemotherapy: N/A  Use Nursing Home Standing Orders: Yes     Mantoux instructions    Give two-step Mantoux (PPD) Per Facility Policy Yes     Reason for your hospital stay    COVID-19, SVT/Bradycardia     Activity - Up ad brigida     Follow Up and recommended labs and tests    Follow up with Nursing home physician.    Follow up with cardiology consider outpatient ablation     No CPR- Do NOT Intubate     Physical Therapy Adult Consult    Evaluate and treat as clinically indicated.    Reason:  weakness     Occupational Therapy Adult Consult    Evaluate and treat as clinically indicated.    Reason:  weakness     Contact and Droplet Isolation    COVID-19     Diet    Follow this diet upon discharge: Orders Placed This Encounter      Combination Diet Regular Diet Adult       Significant Results and Procedures   Results for orders placed or performed during the hospital encounter of 05/08/24   Head CT w/o contrast    Narrative    EXAM: CT HEAD W/O CONTRAST  LOCATION: [facility]  DATE: 5/8/2024    INDICATION:  Trauma, fall  COMPARISON:  No recent comparison.  TECHNIQUE: Routine CT Head without IV contrast. Multiplanar reformats. Dose reduction techniques were used.    FINDINGS:  INTRACRANIAL CONTENTS: No intracranial hemorrhage, extraaxial collection, or mass effect.  No CT evidence of acute infarct. Mild to moderate presumed chronic small vessel ischemic changes. Mild to moderate generalized volume loss. No hydrocephalus.     VISUALIZED ORBITS/SINUSES/MASTOIDS: Prior bilateral cataract surgery. Visualized portions of the orbits are otherwise unremarkable. Mild to moderate mucosal thickening  scattered about the paranasal sinuses. No middle ear or mastoid effusion.    BONES/SOFT TISSUES: No acute abnormality.      Impression    IMPRESSION:  1.  No CT evidence for acute intracranial process.  2.  Brain atrophy and presumed chronic microvascular ischemic changes as above.   XR Chest 1 View    Narrative    EXAM: XR CHEST 1 VIEW  LOCATION: [facility]  DATE: 2024    INDICATION: weakness  COMPARISON: 6/15/2022      Impression    IMPRESSION: Heart size is likely stable. Lung volumes are diminished. There is increasing prominence of central pulmonary vasculature and increased interstitial density. This may be hypoventilatory versus early interstitial edema. No large effusions or   pneumothorax. No acute bony abnormality.   Echocardiogram Complete    Narrative    [id number]  [id number]  [id number]  [id number]^FITO^[place] [address]     Name: ANKIT BRUCE  MRN: [id number]  : 1940  Study Date: 05/10/2024 08:45 AM  Age: 83 yrs  Gender: Male  Patient Location: [facility]  Reason For Study: Chest Discomfort  Ordering Physician: ARIEL PAYTON  Performed By: CECILIA     BSA: 2.0 m2  Height: 69 in  Weight: 195 lb  HR: 48  BP: 104/53 mmHg  ______________________________________________________________________________  Procedure  Complete Portable Echo Adult. Definity (NDC #66294-248) given intravenously.  LOT [id number], Exp 2025.  ______________________________________________________________________________  Interpretation Summary     1. Normal left ventricular size and systolic performance with a visually  estimated ejection fraction of 60%.  2. There is trace aortic insufficiency.  3. Normal right ventricular size and systolic performance.  4. There is mild left atrial enlargement.  5. There is mild enlargement of the proximal ascending aorta.  ______________________________________________________________________________  Left  ventricle:  Normal left ventricular size and systolic performance with a visually  estimated ejection fraction of 60%. There is normal regional wall motion. Left  ventricular wall thickness is normal.     Assessment of LV Diastolic Function: The cumulative findings are indeterminate  in the evaluation of diastolic function [The septal e' velocity is < 7 cm/s &  lateral e' velocity is < 10 cm/s. The average E/e' is < 14. TR velocity is <  2.8 m/s. Left atrial volume index is greater than 34 mL/mÂ ].     Right ventricle:  Normal right ventricular size and systolic performance.     Left atrium:  There is mild left atrial enlargement.     Right atrium:  The right atrium is not well-visualized.     IVC:  The IVC is not well-visualized.     Aortic valve:  The aortic valve is comprised of three cusps. There is no significant aortic  stenosis. There is trace aortic insufficiency.     Mitral valve:  The mitral valve appears morphologically normal. There is trace mitral  insufficiency.     Tricuspid valve:  The tricuspid valve is grossly morphologically normal. There is trace  tricuspid insufficiency.     Pulmonic valve:  The pulmonic valve is grossly morphologically normal. There is mild pulmonic  insufficiency.     Thoracic aorta:  There is mild enlargement of the proximal ascending aorta.     Pericardium:  There is no significant pericardial effusion.  ______________________________________________________________________________  ______________________________________________________________________________  MMode/2D Measurements & Calculations  IVSd: 0.99 cm  LVIDd: 5.2 cm  LVIDs: 3.4 cm  LVPWd: 0.92 cm  FS: 33.7 %  LV mass(C)d: 180.3 grams  LV mass(C)dI: 88.2 grams/m2  Ao root diam: 3.3 cm  LA dimension: 5.1 cm  asc Aorta Diam: 4.0 cm  LA/Ao: 1.5  LVOT diam: 2.2 cm  LVOT area: 3.8 cm2  Ao root diam index Ht(cm/m): 1.9  Ao root diam index BSA (cm/m2): 1.6  Asc Ao diam index BSA (cm/m2): 2.0  Asc Ao diam index Ht(cm/m):  2.3  EF Biplane: 64.9 %  LA Volume (BP): 82.2 ml     LA Volume Index (BP): 40.3 ml/m2  LA Volume Indexed (AL/bp): 42.3 ml/m2  RWT: 0.36  TAPSE: 2.6 cm     Time Measurements  MM HR: 48.0 BPM     Doppler Measurements & Calculations  MV E max juan antonio: 62.1 cm/sec  MV A max juan antonio: 77.6 cm/sec  MV E/A: 0.80  MV dec slope: 229.1 cm/sec2  MV dec time: 0.27 sec  Ao V2 max: 175.1 cm/sec  Ao max P.0 mmHg  Ao V2 mean: 127.9 cm/sec  Ao mean P.3 mmHg  Ao V2 VTI: 39.2 cm  SHANTANU(I,D): 2.3 cm2  SHANTANU(V,D): 2.2 cm2  LV V1 max P.3 mmHg  LV V1 max: 103.3 cm/sec  LV V1 VTI: 24.0 cm  SV(LVOT): 91.3 ml  SI(LVOT): 44.7 ml/m2  PA acc time: 0.13 sec  TR max juan antonio: 259.3 cm/sec  TR max P.9 mmHg  AV Juan Antonio Ratio (DI): 0.59  SHANTANU Index (cm2/m2): 1.1  E/E': 10.4  E/E' av.8     Lateral E/e': 10.4  Medial E/e': 13.3  Peak E' Juan Antonio: 6.0 cm/sec  RV S Juan Antonio: 13.2 cm/sec     ______________________________________________________________________________  Report approved by: Deepa Pham 05/10/2024 02:16 PM             Discharge Medications   Discharge Medication List as of 2024 12:24 PM        START taking these medications    Details   nirmatrelvir and ritonavir (PAXLOVID) 300 mg/100 mg therapy pack Take 3 tablets by mouth 2 times daily for 1 day . Finish remainder of Paxlovid therapy pack that was started in the hospital.  Follow instructions on that pack., No Print OutThis order will not be sent to a retail pharmacy. This order is intended for th e AVS summary and for continuation of the remainder of the Paxlovid pack dispensed by the inpatient pharmacy at home. Confirm that the patient has received the inpatient pharmacy supplied Paxlovid to take home.           CONTINUE these medications which have CHANGED    Details   atenolol (TENORMIN) 25 MG tablet Take 1.5 tablets (37.5 mg) by mouth daily, Transitional           CONTINUE these medications which have NOT CHANGED    Details   acetaminophen (TYLENOL) 325 MG tablet Take 650 mg  by mouth every 6 hours as needed, Historical      aspirin 81 MG EC tablet Take 81 mg by mouth daily, Historical      carbidopa-levodopa (SINEMET)  MG tablet Take 2 tablets by mouth 3 times daily 7 am, 1 pm, and 7pm., Historical      donepezil (ARICEPT) 5 MG tablet Take 5 mg by mouth daily, Historical      finasteride (PROSCAR) 5 MG tablet Take 1 tablet by mouth every morning, Historical      multivitamin, therapeutic (THERA-VIT) TABS tablet Take 1 tablet by mouth daily, Historical      NYAMYC 270660 UNIT/GM external powder Apply topically 2 times daily as needed, TAMMIE, Historical      tamsulosin (FLOMAX) 0.4 MG capsule Take 0.4 mg by mouth every evening, Historical      traZODone (DESYREL) 50 MG tablet Take 50 mg by mouth at bedtime, Historical      VITAMIN D (CHOLECALCIFEROL) PO Take 2,000 Units by mouth daily, Historical      vitamin E (TOCOPHEROL) 200 units (90 mg) capsule Take 200 Units by mouth daily, Historical           Allergies   Allergies   Allergen Reactions    Nka [No Known Allergies]

## 2024-05-14 NOTE — PROGRESS NOTES
Connected Care Resource Center: Connected [facility] [place]    Background: Transitional Care Management program identified per system criteria and reviewed by Connected Care Resource Center team for possible outreach.    Assessment: Upon chart review, CCRC Team member will not proceed with patient outreach related to this episode of Transitional Care Management program due to reason below:    Non-[facility] TCU: CCRC team member noted patient discharged to TCU/ARU/LTACH. Patient is not established with a [facility] currently supported by Primary Care-Care Coordination therefore handoff to Primary Care-Care Coordination is not appropriate at this time.    Plan: Transitional Care Management episode addressed appropriately per reason noted above.      Nelda Fall MA  [facility] Care Resource [place], [facility]    *Connected Care Resource Team does NOT follow patient ongoing. Referrals are identified based on internal discharge reports and the outreach is to ensure patient has an understanding of their discharge instructions.

## 2024-05-28 ENCOUNTER — LAB REQUISITION (OUTPATIENT)
Dept: LAB | Facility: CLINIC | Age: 84
End: 2024-05-28
Payer: COMMERCIAL

## 2024-05-28 DIAGNOSIS — D64.9 ANEMIA, UNSPECIFIED: ICD-10-CM

## 2024-05-28 DIAGNOSIS — I10 ESSENTIAL (PRIMARY) HYPERTENSION: ICD-10-CM

## 2024-05-29 LAB
ANION GAP SERPL CALCULATED.3IONS-SCNC: 11 MMOL/L (ref 7–15)
BUN SERPL-MCNC: 14.2 MG/DL (ref 8–23)
CALCIUM SERPL-MCNC: 8.9 MG/DL (ref 8.8–10.2)
CHLORIDE SERPL-SCNC: 103 MMOL/L (ref 98–107)
CREAT SERPL-MCNC: 0.98 MG/DL (ref 0.67–1.17)
DEPRECATED HCO3 PLAS-SCNC: 23 MMOL/L (ref 22–29)
EGFRCR SERPLBLD CKD-EPI 2021: 77 ML/MIN/1.73M2
ERYTHROCYTE [DISTWIDTH] IN BLOOD BY AUTOMATED COUNT: 13.2 % (ref 10–15)
GLUCOSE SERPL-MCNC: 109 MG/DL (ref 70–99)
HCT VFR BLD AUTO: 41.1 % (ref 40–53)
HGB BLD-MCNC: 13.7 G/DL (ref 13.3–17.7)
MCH RBC QN AUTO: 32.6 PG (ref 26.5–33)
MCHC RBC AUTO-ENTMCNC: 33.3 G/DL (ref 31.5–36.5)
MCV RBC AUTO: 98 FL (ref 78–100)
PLATELET # BLD AUTO: 168 10E3/UL (ref 150–450)
POTASSIUM SERPL-SCNC: 4.5 MMOL/L (ref 3.4–5.3)
RBC # BLD AUTO: 4.2 10E6/UL (ref 4.4–5.9)
SODIUM SERPL-SCNC: 137 MMOL/L (ref 135–145)
WBC # BLD AUTO: 8 10E3/UL (ref 4–11)

## 2024-05-29 PROCEDURE — 80048 BASIC METABOLIC PNL TOTAL CA: CPT | Mod: ORL | Performed by: NURSE PRACTITIONER

## 2024-05-29 PROCEDURE — P9603 ONE-WAY ALLOW PRORATED MILES: HCPCS | Mod: ORL | Performed by: NURSE PRACTITIONER

## 2024-05-29 PROCEDURE — 36415 COLL VENOUS BLD VENIPUNCTURE: CPT | Mod: ORL | Performed by: NURSE PRACTITIONER

## 2024-05-29 PROCEDURE — 85027 COMPLETE CBC AUTOMATED: CPT | Mod: ORL | Performed by: NURSE PRACTITIONER

## 2024-06-09 ENCOUNTER — HEALTH MAINTENANCE LETTER (OUTPATIENT)
Age: 84
End: 2024-06-09

## 2025-01-05 ENCOUNTER — LAB REQUISITION (OUTPATIENT)
Dept: LAB | Facility: CLINIC | Age: 85
End: 2025-01-05
Payer: COMMERCIAL

## 2025-01-05 DIAGNOSIS — E55.9 VITAMIN D DEFICIENCY, UNSPECIFIED: ICD-10-CM

## 2025-01-05 DIAGNOSIS — I10 ESSENTIAL (PRIMARY) HYPERTENSION: ICD-10-CM

## 2025-01-05 DIAGNOSIS — F32.0 MAJOR DEPRESSIVE DISORDER, SINGLE EPISODE, MILD: ICD-10-CM

## 2025-01-05 DIAGNOSIS — F02.80 DEMENTIA IN OTHER DISEASES CLASSIFIED ELSEWHERE, UNSPECIFIED SEVERITY, WITHOUT BEHAVIORAL DISTURBANCE, PSYCHOTIC DISTURBANCE, MOOD DISTURBANCE, AND ANXIETY (H): ICD-10-CM

## 2025-01-08 LAB
ANION GAP SERPL CALCULATED.3IONS-SCNC: 10 MMOL/L (ref 7–15)
BUN SERPL-MCNC: 19.3 MG/DL (ref 8–23)
CALCIUM SERPL-MCNC: 9 MG/DL (ref 8.8–10.4)
CHLORIDE SERPL-SCNC: 102 MMOL/L (ref 98–107)
CREAT SERPL-MCNC: 1.04 MG/DL (ref 0.67–1.17)
EGFRCR SERPLBLD CKD-EPI 2021: 71 ML/MIN/1.73M2
ERYTHROCYTE [DISTWIDTH] IN BLOOD BY AUTOMATED COUNT: 12.9 % (ref 10–15)
GLUCOSE SERPL-MCNC: 95 MG/DL (ref 70–99)
HCO3 SERPL-SCNC: 27 MMOL/L (ref 22–29)
HCT VFR BLD AUTO: 43.6 % (ref 40–53)
HGB BLD-MCNC: 13.9 G/DL (ref 13.3–17.7)
MCH RBC QN AUTO: 31.7 PG (ref 26.5–33)
MCHC RBC AUTO-ENTMCNC: 31.9 G/DL (ref 31.5–36.5)
MCV RBC AUTO: 99 FL (ref 78–100)
PLATELET # BLD AUTO: 161 10E3/UL (ref 150–450)
POTASSIUM SERPL-SCNC: 4.4 MMOL/L (ref 3.4–5.3)
RBC # BLD AUTO: 4.39 10E6/UL (ref 4.4–5.9)
SODIUM SERPL-SCNC: 139 MMOL/L (ref 135–145)
TSH SERPL DL<=0.005 MIU/L-ACNC: 3.12 UIU/ML (ref 0.3–4.2)
VIT D+METAB SERPL-MCNC: 42 NG/ML (ref 20–50)
WBC # BLD AUTO: 8.3 10E3/UL (ref 4–11)

## 2025-01-08 PROCEDURE — 84443 ASSAY THYROID STIM HORMONE: CPT | Mod: ORL

## 2025-01-08 PROCEDURE — 82306 VITAMIN D 25 HYDROXY: CPT | Mod: ORL

## 2025-01-08 PROCEDURE — 36415 COLL VENOUS BLD VENIPUNCTURE: CPT | Mod: ORL

## 2025-01-08 PROCEDURE — P9603 ONE-WAY ALLOW PRORATED MILES: HCPCS | Mod: ORL

## 2025-01-08 PROCEDURE — 80048 BASIC METABOLIC PNL TOTAL CA: CPT | Mod: ORL

## 2025-01-08 PROCEDURE — 85027 COMPLETE CBC AUTOMATED: CPT | Mod: ORL

## 2025-04-06 ENCOUNTER — APPOINTMENT (OUTPATIENT)
Dept: RADIOLOGY | Facility: CLINIC | Age: 85
End: 2025-04-06
Attending: EMERGENCY MEDICINE
Payer: COMMERCIAL

## 2025-04-06 ENCOUNTER — HOSPITAL ENCOUNTER (EMERGENCY)
Facility: CLINIC | Age: 85
Discharge: STILL ADMITTED OR ADMINISTRATIVE | End: 2025-04-07
Attending: EMERGENCY MEDICINE | Admitting: EMERGENCY MEDICINE
Payer: COMMERCIAL

## 2025-04-06 ENCOUNTER — APPOINTMENT (OUTPATIENT)
Dept: CT IMAGING | Facility: CLINIC | Age: 85
End: 2025-04-06
Attending: EMERGENCY MEDICINE
Payer: COMMERCIAL

## 2025-04-06 DIAGNOSIS — S06.5XAA SUBDURAL HEMATOMA (H): ICD-10-CM

## 2025-04-06 LAB
ALBUMIN UR-MCNC: 10 MG/DL
ANION GAP SERPL CALCULATED.3IONS-SCNC: 9 MMOL/L (ref 7–15)
APPEARANCE UR: CLEAR
BASOPHILS # BLD AUTO: 0 10E3/UL (ref 0–0.2)
BASOPHILS NFR BLD AUTO: 0 %
BILIRUB UR QL STRIP: NEGATIVE
BUN SERPL-MCNC: 17.5 MG/DL (ref 8–23)
CALCIUM SERPL-MCNC: 8.8 MG/DL (ref 8.8–10.4)
CHLORIDE SERPL-SCNC: 106 MMOL/L (ref 98–107)
COLOR UR AUTO: ABNORMAL
CREAT SERPL-MCNC: 1.01 MG/DL (ref 0.67–1.17)
EGFRCR SERPLBLD CKD-EPI 2021: 73 ML/MIN/1.73M2
EOSINOPHIL # BLD AUTO: 0 10E3/UL (ref 0–0.7)
EOSINOPHIL NFR BLD AUTO: 0 %
ERYTHROCYTE [DISTWIDTH] IN BLOOD BY AUTOMATED COUNT: 12.4 % (ref 10–15)
GLUCOSE SERPL-MCNC: 104 MG/DL (ref 70–99)
GLUCOSE UR STRIP-MCNC: NEGATIVE MG/DL
HCO3 SERPL-SCNC: 25 MMOL/L (ref 22–29)
HCT VFR BLD AUTO: 37.9 % (ref 40–53)
HGB BLD-MCNC: 12.8 G/DL (ref 13.3–17.7)
HGB UR QL STRIP: ABNORMAL
IMM GRANULOCYTES # BLD: 0 10E3/UL
IMM GRANULOCYTES NFR BLD: 0 %
KETONES UR STRIP-MCNC: ABNORMAL MG/DL
LEUKOCYTE ESTERASE UR QL STRIP: NEGATIVE
LYMPHOCYTES # BLD AUTO: 0.7 10E3/UL (ref 0.8–5.3)
LYMPHOCYTES NFR BLD AUTO: 7 %
MCH RBC QN AUTO: 32.1 PG (ref 26.5–33)
MCHC RBC AUTO-ENTMCNC: 33.8 G/DL (ref 31.5–36.5)
MCV RBC AUTO: 95 FL (ref 78–100)
MONOCYTES # BLD AUTO: 0.7 10E3/UL (ref 0–1.3)
MONOCYTES NFR BLD AUTO: 6 %
MUCOUS THREADS #/AREA URNS LPF: PRESENT /LPF
NEUTROPHILS # BLD AUTO: 8.8 10E3/UL (ref 1.6–8.3)
NEUTROPHILS NFR BLD AUTO: 86 %
NITRATE UR QL: NEGATIVE
NRBC # BLD AUTO: 0 10E3/UL
NRBC BLD AUTO-RTO: 0 /100
PH UR STRIP: 5 [PH] (ref 5–7)
PLATELET # BLD AUTO: 144 10E3/UL (ref 150–450)
POTASSIUM SERPL-SCNC: 4.3 MMOL/L (ref 3.4–5.3)
RBC # BLD AUTO: 3.99 10E6/UL (ref 4.4–5.9)
RBC URINE: 12 /HPF
SODIUM SERPL-SCNC: 140 MMOL/L (ref 135–145)
SP GR UR STRIP: 1.03 (ref 1–1.03)
SQUAMOUS EPITHELIAL: 1 /HPF
TROPONIN T SERPL HS-MCNC: 48 NG/L
UROBILINOGEN UR STRIP-MCNC: NORMAL MG/DL
WBC # BLD AUTO: 10.3 10E3/UL (ref 4–11)
WBC URINE: 1 /HPF

## 2025-04-06 PROCEDURE — 73552 X-RAY EXAM OF FEMUR 2/>: CPT | Mod: LT

## 2025-04-06 PROCEDURE — 99291 CRITICAL CARE FIRST HOUR: CPT | Mod: 25

## 2025-04-06 PROCEDURE — 80048 BASIC METABOLIC PNL TOTAL CA: CPT | Performed by: EMERGENCY MEDICINE

## 2025-04-06 PROCEDURE — 36415 COLL VENOUS BLD VENIPUNCTURE: CPT | Performed by: EMERGENCY MEDICINE

## 2025-04-06 PROCEDURE — 73030 X-RAY EXAM OF SHOULDER: CPT | Mod: LT

## 2025-04-06 PROCEDURE — 70450 CT HEAD/BRAIN W/O DYE: CPT

## 2025-04-06 PROCEDURE — 85014 HEMATOCRIT: CPT | Performed by: EMERGENCY MEDICINE

## 2025-04-06 PROCEDURE — 85004 AUTOMATED DIFF WBC COUNT: CPT | Performed by: EMERGENCY MEDICINE

## 2025-04-06 PROCEDURE — 93005 ELECTROCARDIOGRAM TRACING: CPT | Performed by: EMERGENCY MEDICINE

## 2025-04-06 PROCEDURE — 81001 URINALYSIS AUTO W/SCOPE: CPT | Performed by: EMERGENCY MEDICINE

## 2025-04-06 PROCEDURE — 84484 ASSAY OF TROPONIN QUANT: CPT | Performed by: EMERGENCY MEDICINE

## 2025-04-06 ASSESSMENT — COLUMBIA-SUICIDE SEVERITY RATING SCALE - C-SSRS
2. HAVE YOU ACTUALLY HAD ANY THOUGHTS OF KILLING YOURSELF IN THE PAST MONTH?: NO
1. IN THE PAST MONTH, HAVE YOU WISHED YOU WERE DEAD OR WISHED YOU COULD GO TO SLEEP AND NOT WAKE UP?: NO
6. HAVE YOU EVER DONE ANYTHING, STARTED TO DO ANYTHING, OR PREPARED TO DO ANYTHING TO END YOUR LIFE?: NO

## 2025-04-06 ASSESSMENT — ACTIVITIES OF DAILY LIVING (ADL): ADLS_ACUITY_SCORE: 62

## 2025-04-07 ENCOUNTER — HOSPITAL ENCOUNTER (OUTPATIENT)
Facility: CLINIC | Age: 85
Setting detail: OBSERVATION
Discharge: NURSING, REHAB OR LONG-TERM CARE | End: 2025-04-07
Attending: HOSPITALIST | Admitting: INTERNAL MEDICINE
Payer: COMMERCIAL

## 2025-04-07 ENCOUNTER — APPOINTMENT (OUTPATIENT)
Dept: CT IMAGING | Facility: CLINIC | Age: 85
End: 2025-04-07
Attending: INTERNAL MEDICINE
Payer: COMMERCIAL

## 2025-04-07 VITALS
SYSTOLIC BLOOD PRESSURE: 147 MMHG | HEART RATE: 59 BPM | TEMPERATURE: 98.4 F | RESPIRATION RATE: 18 BRPM | DIASTOLIC BLOOD PRESSURE: 62 MMHG | OXYGEN SATURATION: 97 %

## 2025-04-07 VITALS
DIASTOLIC BLOOD PRESSURE: 64 MMHG | BODY MASS INDEX: 32.44 KG/M2 | SYSTOLIC BLOOD PRESSURE: 141 MMHG | TEMPERATURE: 97.8 F | RESPIRATION RATE: 22 BRPM | WEIGHT: 190 LBS | HEART RATE: 56 BPM | OXYGEN SATURATION: 96 % | HEIGHT: 64 IN

## 2025-04-07 DIAGNOSIS — Z13.6 CARDIOVASCULAR SCREENING; LDL GOAL LESS THAN 130: Primary | ICD-10-CM

## 2025-04-07 DIAGNOSIS — S06.9XAA UNSPECIFIED INTRACRANIAL INJURY WITH LOSS OF CONSCIOUSNESS STATUS UNKNOWN, INITIAL ENCOUNTER (H): ICD-10-CM

## 2025-04-07 DIAGNOSIS — S06.5XAA SDH (SUBDURAL HEMATOMA) (H): ICD-10-CM

## 2025-04-07 DIAGNOSIS — T14.8XXA ABRASION: ICD-10-CM

## 2025-04-07 LAB
APTT PPP: 31 SECONDS (ref 22–38)
ATRIAL RATE - MUSE: 64 BPM
DIASTOLIC BLOOD PRESSURE - MUSE: 64 MMHG
INR PPP: 1.08 (ref 0.85–1.15)
INTERPRETATION ECG - MUSE: NORMAL
P AXIS - MUSE: 51 DEGREES
PR INTERVAL - MUSE: 184 MS
QRS DURATION - MUSE: 130 MS
QT - MUSE: 422 MS
QTC - MUSE: 435 MS
R AXIS - MUSE: -58 DEGREES
SYSTOLIC BLOOD PRESSURE - MUSE: 137 MMHG
T AXIS - MUSE: -1 DEGREES
TROPONIN T SERPL HS-MCNC: 52 NG/L
TROPONIN T SERPL HS-MCNC: 56 NG/L
TROPONIN T SERPL HS-MCNC: 58 NG/L
VENTRICULAR RATE- MUSE: 64 BPM

## 2025-04-07 PROCEDURE — G0378 HOSPITAL OBSERVATION PER HR: HCPCS

## 2025-04-07 PROCEDURE — 99204 OFFICE O/P NEW MOD 45 MIN: CPT

## 2025-04-07 PROCEDURE — 99418 PROLNG IP/OBS E/M EA 15 MIN: CPT | Performed by: INTERNAL MEDICINE

## 2025-04-07 PROCEDURE — 70450 CT HEAD/BRAIN W/O DYE: CPT

## 2025-04-07 PROCEDURE — 99236 HOSP IP/OBS SAME DATE HI 85: CPT | Performed by: INTERNAL MEDICINE

## 2025-04-07 PROCEDURE — 85610 PROTHROMBIN TIME: CPT | Performed by: EMERGENCY MEDICINE

## 2025-04-07 PROCEDURE — 84484 ASSAY OF TROPONIN QUANT: CPT | Performed by: EMERGENCY MEDICINE

## 2025-04-07 PROCEDURE — 36415 COLL VENOUS BLD VENIPUNCTURE: CPT | Performed by: EMERGENCY MEDICINE

## 2025-04-07 PROCEDURE — 85730 THROMBOPLASTIN TIME PARTIAL: CPT | Performed by: EMERGENCY MEDICINE

## 2025-04-07 PROCEDURE — G0463 HOSPITAL OUTPT CLINIC VISIT: HCPCS

## 2025-04-07 PROCEDURE — 99207 PR APP CREDIT; MD BILLING SHARED VISIT: CPT | Performed by: INTERNAL MEDICINE

## 2025-04-07 PROCEDURE — 84484 ASSAY OF TROPONIN QUANT: CPT | Performed by: INTERNAL MEDICINE

## 2025-04-07 PROCEDURE — 250N000013 HC RX MED GY IP 250 OP 250 PS 637: Performed by: INTERNAL MEDICINE

## 2025-04-07 PROCEDURE — 36415 COLL VENOUS BLD VENIPUNCTURE: CPT | Performed by: INTERNAL MEDICINE

## 2025-04-07 PROCEDURE — 92610 EVALUATE SWALLOWING FUNCTION: CPT | Mod: GN

## 2025-04-07 RX ORDER — AMOXICILLIN 250 MG
1 CAPSULE ORAL 2 TIMES DAILY PRN
Status: DISCONTINUED | OUTPATIENT
Start: 2025-04-07 | End: 2025-04-07 | Stop reason: HOSPADM

## 2025-04-07 RX ORDER — FINASTERIDE 5 MG/1
5 TABLET, FILM COATED ORAL EVERY MORNING
Status: DISCONTINUED | OUTPATIENT
Start: 2025-04-07 | End: 2025-04-07 | Stop reason: HOSPADM

## 2025-04-07 RX ORDER — BISACODYL 10 MG
10 SUPPOSITORY, RECTAL RECTAL DAILY PRN
Status: DISCONTINUED | OUTPATIENT
Start: 2025-04-07 | End: 2025-04-07 | Stop reason: HOSPADM

## 2025-04-07 RX ORDER — AMOXICILLIN 250 MG
2 CAPSULE ORAL 2 TIMES DAILY PRN
Status: DISCONTINUED | OUTPATIENT
Start: 2025-04-07 | End: 2025-04-07 | Stop reason: HOSPADM

## 2025-04-07 RX ORDER — LABETALOL HYDROCHLORIDE 5 MG/ML
10 INJECTION, SOLUTION INTRAVENOUS
Status: DISCONTINUED | OUTPATIENT
Start: 2025-04-07 | End: 2025-04-07 | Stop reason: HOSPADM

## 2025-04-07 RX ORDER — NALOXONE HYDROCHLORIDE 0.4 MG/ML
0.4 INJECTION, SOLUTION INTRAMUSCULAR; INTRAVENOUS; SUBCUTANEOUS
Status: DISCONTINUED | OUTPATIENT
Start: 2025-04-07 | End: 2025-04-07 | Stop reason: HOSPADM

## 2025-04-07 RX ORDER — NALOXONE HYDROCHLORIDE 0.4 MG/ML
0.2 INJECTION, SOLUTION INTRAMUSCULAR; INTRAVENOUS; SUBCUTANEOUS
Status: DISCONTINUED | OUTPATIENT
Start: 2025-04-07 | End: 2025-04-07 | Stop reason: HOSPADM

## 2025-04-07 RX ORDER — CARBIDOPA AND LEVODOPA 25; 100 MG/1; MG/1
2 TABLET ORAL 3 TIMES DAILY
Status: DISCONTINUED | OUTPATIENT
Start: 2025-04-07 | End: 2025-04-07 | Stop reason: HOSPADM

## 2025-04-07 RX ORDER — TRAZODONE HYDROCHLORIDE 50 MG/1
50 TABLET ORAL AT BEDTIME
Status: DISCONTINUED | OUTPATIENT
Start: 2025-04-07 | End: 2025-04-07 | Stop reason: HOSPADM

## 2025-04-07 RX ORDER — LIDOCAINE 40 MG/G
CREAM TOPICAL
Status: DISCONTINUED | OUTPATIENT
Start: 2025-04-07 | End: 2025-04-07 | Stop reason: HOSPADM

## 2025-04-07 RX ORDER — POLYETHYLENE GLYCOL 3350 17 G/17G
17 POWDER, FOR SOLUTION ORAL 2 TIMES DAILY PRN
Status: DISCONTINUED | OUTPATIENT
Start: 2025-04-07 | End: 2025-04-07 | Stop reason: HOSPADM

## 2025-04-07 RX ORDER — ACETAMINOPHEN 650 MG/1
650 SUPPOSITORY RECTAL EVERY 4 HOURS PRN
Status: DISCONTINUED | OUTPATIENT
Start: 2025-04-07 | End: 2025-04-07 | Stop reason: HOSPADM

## 2025-04-07 RX ORDER — ACETAMINOPHEN 325 MG/1
650 TABLET ORAL EVERY 4 HOURS PRN
Status: DISCONTINUED | OUTPATIENT
Start: 2025-04-07 | End: 2025-04-07 | Stop reason: HOSPADM

## 2025-04-07 RX ORDER — HYDRALAZINE HYDROCHLORIDE 20 MG/ML
10 INJECTION INTRAMUSCULAR; INTRAVENOUS EVERY 4 HOURS PRN
Status: DISCONTINUED | OUTPATIENT
Start: 2025-04-07 | End: 2025-04-07 | Stop reason: HOSPADM

## 2025-04-07 RX ORDER — ACETAMINOPHEN 325 MG/1
650 TABLET ORAL EVERY 6 HOURS PRN
Status: DISCONTINUED | OUTPATIENT
Start: 2025-04-07 | End: 2025-04-07

## 2025-04-07 RX ORDER — ASPIRIN 81 MG/1
81 TABLET ORAL DAILY
DISCHARGE
Start: 2025-05-19 | End: 2025-04-07

## 2025-04-07 RX ORDER — DONEPEZIL HYDROCHLORIDE 5 MG/1
5 TABLET, FILM COATED ORAL DAILY
Status: DISCONTINUED | OUTPATIENT
Start: 2025-04-07 | End: 2025-04-07 | Stop reason: HOSPADM

## 2025-04-07 RX ORDER — OXYCODONE HYDROCHLORIDE 5 MG/1
5 TABLET ORAL EVERY 4 HOURS PRN
Status: DISCONTINUED | OUTPATIENT
Start: 2025-04-07 | End: 2025-04-07 | Stop reason: HOSPADM

## 2025-04-07 RX ORDER — TAMSULOSIN HYDROCHLORIDE 0.4 MG/1
0.4 CAPSULE ORAL EVERY EVENING
Status: DISCONTINUED | OUTPATIENT
Start: 2025-04-07 | End: 2025-04-07 | Stop reason: HOSPADM

## 2025-04-07 RX ADMIN — DONEPEZIL HYDROCHLORIDE 5 MG: 5 TABLET, FILM COATED ORAL at 11:07

## 2025-04-07 RX ADMIN — FINASTERIDE 5 MG: 5 TABLET, FILM COATED ORAL at 11:55

## 2025-04-07 RX ADMIN — CARBIDOPA AND LEVODOPA 2 TABLET: 25; 100 TABLET ORAL at 11:07

## 2025-04-07 ASSESSMENT — ACTIVITIES OF DAILY LIVING (ADL)
ADLS_ACUITY_SCORE: 66
ADLS_ACUITY_SCORE: 62
ADLS_ACUITY_SCORE: 67
ADLS_ACUITY_SCORE: 62
ADLS_ACUITY_SCORE: 62
ADLS_ACUITY_SCORE: 67
ADLS_ACUITY_SCORE: 67
ADLS_ACUITY_SCORE: 62
ADLS_ACUITY_SCORE: 62
ADLS_ACUITY_SCORE: 66
ADLS_ACUITY_SCORE: 66
DEPENDENT_IADLS:: CLEANING;COOKING;LAUNDRY;SHOPPING;MEAL PREPARATION;MEDICATION MANAGEMENT;TRANSPORTATION;MONEY MANAGEMENT
ADLS_ACUITY_SCORE: 67

## 2025-04-07 NOTE — PROGRESS NOTES
[facility] Neurosurgery  Telephone Note     Contacted by Dr. Pires at [facility] ED.      84M with a history of PD and dementia who presented to ED with unwitnessed fall at memory care facility. Not on known anticoagulation.     Per ED:  Neurologically baseline  Moving all extremities well    Imaging:  Narrative & Impression   EXAM: CT HEAD W/O CONTRAST  LOCATION: [facility]  DATE: 4/6/2025     INDICATION: Head injury. Fall.  COMPARISON: 5/8/2024.  TECHNIQUE: Routine CT Head without IV contrast. Multiplanar reformats. Dose reduction techniques were used.     FINDINGS:  INTRACRANIAL CONTENTS: Acute left hemispheric subdural hemorrhage measures up to 8 mm in thickness along the left frontal lobe. No midline shift or effacement of basal cisterns. No CT evidence of acute infarct. Mild to moderate presumed chronic small   vessel ischemic changes. Mild to moderate generalized volume loss. No hydrocephalus.      VISUALIZED ORBITS/SINUSES/MASTOIDS: Prior bilateral cataract surgery. Visualized portions of the orbits are otherwise unremarkable. No paranasal sinus mucosal disease. Right mastoid effusion.     BONES/SOFT TISSUES: Left frontal scalp swelling. No acute calvarial fracture.                                                                      IMPRESSION:  1.  Acute left hemispheric subdural hemorrhage measuring up to 8 mm in thickness. No adverse intracranial mass effect.     Plan:   -Agree with admission via hospitalist  -Repeat head CT in 6 hours  -SBP <150  -HOB 30  -Hold anticoagulation  -GOC discussion as POLST on record indicates comfort measures  -NSGY will see in consultation in AM     Melissa Shi, AMBER  [facility]surgery  [address]  [address] [place] [id number]  Tel [phone]  Fax [phone]  Text page via [facility] Paging/Directory

## 2025-04-07 NOTE — PROGRESS NOTES
Care Management Discharge Note    Discharge Date: 04/07/2025       Discharge Disposition: Assisted Living    Discharge Services:  HHC    Discharge DME:      Discharge Transportation: family or friend will provide    Private pay costs discussed: Not applicable    Does the patient's insurance plan have a 3 day qualifying hospital stay waiver?  Yes     Which insurance plan 3 day waiver is available? Alternative insurance waiver    Will the waiver be used for post-acute placement? No    PAS Confirmation Code:    Patient/family educated on Medicare website which has current facility and service quality ratings:      Education Provided on the Discharge Plan:    Persons Notified of Discharge Plans: Pt/LASHONDA/daughter  Patient/Family in Agreement with the Plan: yes    Handoff Referral Completed: No, handoff not indicated or clinically appropriate    Additional Information:  Pt medically ready for discharge and orders in, including for C PT/OT.  CC attempted again to call LASHONDA: [phone] (Marlen) and main number [phone] where I was directed to leave a message.  Updated daughter who called and got in touch with NA who then put Marlen on the phone.  Reviewed plan for discharge with follow up with [facility] and [facility] PT/OT.  Marlen notes [facility] uses [facility]  CC called [facility] (Jannet) who notes they can accept with SOC on 4/14. Orders sent via [employer].   No new medications.   Marlen states Pt can return.   Family will help coordinate Follow-up with [facility].   Orders faxed to [facility] via [employer]. [phone].    Bedside RN to review AVS, daughter to transport.   Orders faxed to [facility] via [employer]    Tricia Mcmahon RN

## 2025-04-07 NOTE — PLAN OF CARE
Goal Outcome Evaluation:      Plan of Care Reviewed With: patient    Overall Patient Progress: no changeOverall Patient Progress: no change         Pt here with SDH s/p unwitnessed fall in memory care unit, hx Parkinsons. A&Ox3, d/o to time. Neuros: BUE tremors at baseline, forgetful, generalized weakness. VSS. Tele SB w/ BBB. NPO pending neuro consult, passed bedside swallow. Up with walker at baseline, not oob since arrival to unit. Pt reports pain when anything touches L shoulder abrasion; vaseline dressing in place. Pt scoring green on the Aggression Stop Light Tool. Repeat CT completed this AM, results pending - when results are in, page neurosurgery - pt/family does not want surgery, possible discharge this AM.

## 2025-04-07 NOTE — CONSULTS
[facility] Nurse Inpatient Assessment     Consulted for: abrasion L shoulder, forehead    Summary: Minor abrasions, reviewed with daughter in room    [facility] nurse follow-up plan: weekly    Patient History (according to provider note(s):      Per H+P:  84 year old male with Parkinson's disease and dementia who presents after a fall in his memory care facility admitted on 4/7/2025. He was found to have an acute left subdural hematoma without midline shift at [facility] emergency department.  Limited goals of care and would not want to pursue surgical intervention, but transferred to [facility] as care team at [facility] did not feel comfortable managing this.     Left subdural hematoma, acute traumatic:  -Head of bed greater than 30 degrees  -Systolic blood pressure goal less than 150.  As needed hydralazine and labetalol are available if needed  -Fall precautions  -Neurosurgery consulted, aware of patient from outside facility  -Patient agrees with DNR/DNI STATUS and does not believe he would want to pursue surgical intervention if hematoma expanding, but note that he does not appear to have capacity for these medical decisions at this juncture.  Initially he variably answers both that he would desire full code and comfort measures/DNR DNI status.  When I pointed this out, he tells me that he will defer this decision to his daughter, Marni.  -Awaiting arrival of Marni, who I am told is traveling currently from [facility].  Please page when she arrives for further discussion of goals of care.  Note POLST form signed in 2022 indicates a desire for comfort based cares.  -Repeat head CT time for 6 AM.  Okay to repeat anytime between 5:30-8 AM to account for transport availability, etc.  -Every 4 neurochecks and vital signs.  -Addendum: Confirmed with daughter at bedside that patient is DNR/DNI.  Would not want surgical intervention.  -Discontinue aspirin 81 mg  daily.  Resumption timing per neurosurgery, likely 6 weeks  -PT consulted     Left forehead abrasion, left shoulder abrasion: Patient with pain associated with light touch over area of abrasion.  His shoulder joint itself is not tender.  Shoulder abrasion discomfort with gown or light touch is patient's largest concern currently.  -Wound consult  -For now recommend Vaseline gauze followed by Kerlix/gauze square and paper tape.    Assessment:      Areas visualized during today's visit:  Forehead and shoulder    Skin Injury Location: Left forehead and left shoulder    //  4/7 Left forehead     Left shoulder  Last photo: 4/7  Skin injury due to: Abrasion  Skin history and plan of care:   Patient had a fall at his home facility  Affected area:      Skin assessment: Erosion of epidermis     Color: normal and consistent with surrounding tissue     Temperature  normal      Drainage: scant .      Color: serous      Odor: none  Pain: mild, tender  Pain interventions prior to dressing change: patient tolerated well and slow and gentle cares   Treatment goal: Heal   STATUS: initial assessment  Supplies ordered: supplies stored on unit       Treatment Plan:     Left shoulder abrasion  Clean with Mikroklenz, pat dry.   Cover open wound with oil emulsion gauze then mepilex   Change every 5 days and as needed  Call MD for wound decline    Left forehead apply Sween Cream (pink top) to abrasion area daily     Orders: Written    RECOMMEND PRIMARY TEAM ORDER: None, at this time  Education provided: plan of care  Discussed plan of care with: Patient, Family, and Nurse  Notify [facility] if wound(s) deteriorate.  Nursing to notify the Provider(s) and re-consult the WOC Nurse if new skin concern.    DATA:     Current support surface: Standard  Standard gel mattress (Isoflex)  BMI: There is no height or weight on file to calculate BMI.   Active diet order: Orders Placed This Encounter      Regular Diet Adult      Diet     Output: No  intake/output data recorded.     Labs:   Recent Labs   Lab 04/07/25  0026 04/06/25  2318   HGB  --  12.8*   INR 1.08  --    WBC  --  10.3     Pressure injury risk assessment:   Sensory Perception: 3-->slightly limited  Moisture: 3-->occasionally moist  Activity: 3-->walks occasionally  Mobility: 3-->slightly limited  Nutrition: 3-->adequate  Friction and Shear: 2-->potential problem  Denver Score: 17    ERIK DengN, RN, PHN, HNB-BC, CWOCN  Pager no longer is use, please contact through [employer] group: [facility] [employer]

## 2025-04-07 NOTE — ED TRIAGE NOTES
Pt is here from a fall. The staff at [facility] care unit found him laying on the floor in a prone position. Pt has a abrasion to the left forehead and bilateral knees.  Pt denies LOC, no thinners.       Triage Assessment (Adult)       Row Name 04/06/25 4811          Triage Assessment    Airway WDL WDL        Respiratory WDL    Respiratory WDL WDL        Skin Circulation/Temperature WDL    Skin Circulation/Temperature WDL WDL        Cardiac WDL    Cardiac WDL WDL        Peripheral/Neurovascular WDL    Peripheral Neurovascular WDL WDL        Cognitive/Neuro/Behavioral WDL    Cognitive/Neuro/Behavioral WDL WDL

## 2025-04-07 NOTE — CONSULTS
[facility]    Neurosurgery Consultation     Date of Admission:  4/7/2025  Date of Consult (When I saw the patient): 04/07/25    Assessment & Plan   Edparis Lynn is a 84 year old male on ASA 81mg daily past medical history dementia, Parkinson's disease, HTN, BPH brought to [facility] ED for evaluation of an unwitnessed fall at [facility]. Admitted 4/7/25.    Patient states he was attempting to sit down in his chair yesterday evening when he fell, hit his head, denies LOC. Patient states he was calling for help for approximately 1 hour. Currently denies headache, nausea/vomiting, speech/vision changes.     Per chart review: Limited goals of care and would not want to pursue surgical intervention, but transferred to [facility] as care team at [facility] did not feel comfortable managing this. Confirmed with daughter at bedside that patient is DNR/DNI. Would not want surgical intervention.     Initial head CT in [facility] ED demonstrated acute left SDH measuring up to 8mm without midline shift or effacement of the basal cisterns. Repeat head CT demonstrates slightly decreased left SDH measuring 6mm without evidence of new hemorrhage or abnormal extra axial fluid collection.     Imaging reviewed and discussed with patient and patient's daughter. Recommend follow up in outpatient NSGY clinic in 1 month with repeat head CT prior. Recommend holding all anticoagulation until follow up visit with repeat imaging.     Imaging:  Imaging Interpretation provided by radiologist.   EXAM: CT HEAD W/O CONTRAST  LOCATION: [facility]  DATE: 4/7/2025     INDICATION: Follow up subdural  COMPARISON: 4/6/2025  TECHNIQUE: Routine CT Head without IV contrast. Multiplanar reformats. Dose reduction techniques were used.     FINDINGS:  INTRACRANIAL CONTENTS: Slightly decreased size of the hyperattenuating subdural hematoma overlying the lateral left frontal lobe,  likely due to redistribution, now measuring 6 mm, previously 8 mm. No new hemorrhage or abnormal extra axial fluid   collection. No CT evidence of acute infarct. Mild presumed chronic small vessel ischemic changes. Moderate generalized volume loss. No hydrocephalus.      VISUALIZED ORBITS/SINUSES/MASTOIDS: No intraorbital abnormality. No paranasal sinus mucosal disease. No middle ear or mastoid effusion.     BONES/SOFT TISSUES: No acute abnormality.                                                            IMPRESSION:  1.  Slightly decreased thickness of the left foraminal convexity subdural hematoma.  2.  No superimposed acute intracranial abnormality.      EXAM: CT HEAD W/O CONTRAST  LOCATION: [facility]  DATE: 4/6/2025     INDICATION: Head injury. Fall.  COMPARISON: 5/8/2024.  TECHNIQUE: Routine CT Head without IV contrast. Multiplanar reformats. Dose reduction techniques were used.     FINDINGS:  INTRACRANIAL CONTENTS: Acute left hemispheric subdural hemorrhage measures up to 8 mm in thickness along the left frontal lobe. No midline shift or effacement of basal cisterns. No CT evidence of acute infarct. Mild to moderate presumed chronic small   vessel ischemic changes. Mild to moderate generalized volume loss. No hydrocephalus.      VISUALIZED ORBITS/SINUSES/MASTOIDS: Prior bilateral cataract surgery. Visualized portions of the orbits are otherwise unremarkable. No paranasal sinus mucosal disease. Right mastoid effusion.     BONES/SOFT TISSUES: Left frontal scalp swelling. No acute calvarial fracture.                                                               IMPRESSION:  1.  Acute left hemispheric subdural hemorrhage measuring up to 8 mm in thickness. No adverse intracranial mass effect.    Labs 4/6/25:  Sodium 140  WBC 10.3  Hgb 12.8    IN 1.08  PTT 31    NSGY Recommendations:  - No urgent/emergent neurosurgical intervention  - SBP < 150  - HOB > 30 degrees  - Neuro checks  every 4 hours   - HOLD anticoagulation including ASA and NSAIDs  - Hospitalist      Recommend follow up in 1 month with repeat head CT prior. Continue to hold ASA until seen in clinic.  [facility] [place] [facility] to [facility] scheduling appointment. Navigator complete, schedulers messaged.     Okay for [facility] [place] [facility] point of care once medically stable.     NSGY signing off.     Plan discussed with patient, patients daughter, charge nurse, hospitalist, and Dr. Tamayo.     Please contact on call NSGY GIOVANNI via [employer] system for questions or concerns.     I have discussed the following assessment and plan with Dr. Tamayo who is in agreement with initial plan and will follow up with further consultation recommendations.    Kinza Ruiz PA-C  [facility] Neurosurgery  [place] [address]    Text page via [employer] Paging/Directory    Code Status    No CPR- Do NOT Intubate    Reason for Consult   Reason for consult: [facility]    Primary Care Physician   Kamran Gutiérrez    Chief Complaint   Unwitnessed fall    History is obtained from the patient and chart review. HPI limited due patient history of dementia.     History of Present Illness   Al Lynn is a 84 year old male on ASA 81mg daily past medical history dementia, Parkinson's disease, HTN, BPH brought to [facility] ED for evaluation of an unwitnessed fall at memory care facility.     Patient states he was attempting to sit down in his chair yesterday evening when he fell, hit his head, denies LOC. Patient states he was calling for help for approximately 1 hour. Currently denies headache, nausea/vomiting, speech/vision changes.     Per chart review: Limited goals of care and would not want to pursue surgical intervention, but transferred to [facility] as care team at [facility] did not feel comfortable managing this. Confirmed with daughter at bedside that patient is DNR/DNI. Would not want surgical intervention.      Past Medical History   I have reviewed this patient's medical history and updated it with pertinent information if needed.   Past Medical History:   Diagnosis Date    Colon polyp     Cough     Depression     Diverticulitis     Head injury     fell working in garden    Hearing loss     Hemorrhoids     HTN (hypertension)     Sinus drainage     coughing spells?       Past Surgical History   I have reviewed this patient's surgical history and updated it with pertinent information if needed.  Past Surgical History:   Procedure Laterality Date    ESOPHAGOSCOPY, GASTROSCOPY, DUODENOSCOPY (EGD), COMBINED  4/21/2014    Procedure: Gastroscopy ;  Surgeon: Bradley Colmenares MD;  Location: [facility]    HERNIA REPAIR, INGUINAL RT/LT      Z APPENDECTOMY         Prior to Admission Medications   Prior to Admission Medications   Prescriptions Last Dose Informant Patient Reported? Taking?   NYAMYC 875259 UNIT/GM external powder   Yes No   Sig: Apply topically 2 times daily as needed   VITAMIN D (CHOLECALCIFEROL) PO   Yes No   Sig: Take 2,000 Units by mouth daily   acetaminophen (TYLENOL) 325 MG tablet   Yes No   Sig: Take 650 mg by mouth every 6 hours as needed   aspirin 81 MG EC tablet   Yes No   Sig: Take 81 mg by mouth daily   atenolol (TENORMIN) 25 MG tablet   No No   Sig: Take 1.5 tablets (37.5 mg) by mouth daily   carbidopa-levodopa (SINEMET)  MG tablet   Yes No   Sig: Take 2 tablets by mouth 3 times daily 7 am, 1 pm, and 7pm.   donepezil (ARICEPT) 5 MG tablet   Yes No   Sig: Take 5 mg by mouth daily   finasteride (PROSCAR) 5 MG tablet   Yes No   Sig: Take 1 tablet by mouth every morning   multivitamin, therapeutic (THERA-VIT) TABS tablet   Yes No   Sig: Take 1 tablet by mouth daily   tamsulosin (FLOMAX) 0.4 MG capsule   Yes No   Sig: Take 0.4 mg by mouth every evening   traZODone (DESYREL) 50 MG tablet   Yes No   Sig: Take 50 mg by mouth at bedtime   vitamin E (TOCOPHEROL) 200 units (90 mg) capsule   Yes No   Sig: Take 200  Units by mouth daily      Facility-Administered Medications: None     Allergies   Allergies   Allergen Reactions    Nka [No Known Allergies]        Social History   I have reviewed this patient's social history and updated it with pertinent information if needed. Al Lynn  reports that he has quit smoking. He has never used smokeless tobacco. He reports current alcohol use. He reports that he does not use drugs.    Family History   I have reviewed this patient's family history and updated it with pertinent information if needed.   Family History   Problem Relation Age of Onset    C.A.D. Father     Cancer Brother     Neurologic Disorder Brother         parkinsons       ROS: 10 point ROS negative other than the symptoms noted above in the HPI.    Physical Exam   Temp: 98.4  F (36.9  C) Temp src: Oral BP: (!) 147/62 Pulse: 59   Resp: 18 SpO2: 97 % O2 Device: None (Room air)    Vital Signs with Ranges  Temp:  [97.8  F (36.6  C)-99  F (37.2  C)] 98.4  F (36.9  C)  Pulse:  [48-72] 59  Resp:  [16-23] 18  BP: (130-147)/(61-68) 147/62  SpO2:  [94 %-97 %] 97 %  0 lbs 0 oz     , Blood pressure (!) 147/62, pulse 59, temperature 98.4  F (36.9  C), temperature source Oral, resp. rate 18, SpO2 97%.  0 lbs 0 oz    Sitting up in hospital bed with daughter at beside  HEENT:  Normocephalic. Abrasion to left forehead. PERRL.  EOM s intact.   NEUROLOGICAL EXAMINATION:   Mental status:  Alert and Oriented (self, not oriented to location/time - hx of dementia), speech is fluent.  Cranial nerves:  II-XII intact.   Motor:   Shoulder Abduction:       Right:  5/5   Left:  5/5  Elbow Flexion:                Right:  5/5   Left:  5/5  Elbow Extension:            Right:  5/5   Left:  5/5  interosseus :                  Right:  5/5   Left:  5/5  Hip Flexion:                    Right: 5/5  Left:  5/5  Knee Flexion:                 Right:  5/5  Left:  5/5  Knee Extension:             Right:  5/5  Left:  5/5  Dorsiflexion:                    Right:  5/5  Left:  5/5  Plantar Flexion:             Right:  5/5  Left:  5/5  EHL:                              Right:  5/5  Left:  5/5  Sensation:  Intact to light touch throughout BUE/BLE    Reflexes:  Negative Clonus Bilaterally. Negative Motley's Bilaterally.    Coordination:  Tremulous finger to nose testing - baseline BUE tremors.   Gait:  Not formally assessed    Data     CBC RESULTS:   Recent Labs   Lab Test 04/06/25  2318   WBC 10.3   RBC 3.99*   HGB 12.8*   HCT 37.9*   MCV 95   MCH 32.1   MCHC 33.8   RDW 12.4   *     Basic Metabolic Panel:  Lab Results   Component Value Date     04/06/2025     04/01/2014      Lab Results   Component Value Date    POTASSIUM 4.3 04/06/2025    POTASSIUM 4.7 06/15/2022    POTASSIUM 4.3 04/01/2014     Lab Results   Component Value Date    CHLORIDE 106 04/06/2025    CHLORIDE 103 06/15/2022    CHLORIDE 102 04/01/2014     Lab Results   Component Value Date    EMILE 8.8 04/06/2025    EMILE 9.1 04/01/2014     Lab Results   Component Value Date    CO2 25 04/06/2025    CO2 28 06/15/2022    CO2 23 04/01/2014     Lab Results   Component Value Date    BUN 17.5 04/06/2025    BUN 14 06/15/2022    BUN 19 04/01/2014     Lab Results   Component Value Date    CR 1.01 04/06/2025    CR 0.88 04/01/2014     Lab Results   Component Value Date     04/06/2025     06/15/2022     04/01/2014     INR:  Lab Results   Component Value Date    INR 1.08 04/07/2025

## 2025-04-07 NOTE — ED PROVIDER NOTES
EMERGENCY DEPARTMENT ENCOUNTER      NAME: Al Lynn  AGE: 84 year old male  YOB: 1940  MRN: [id number]  EVALUATION DATE & TIME: 4/6/2025 10:43 PM    PCP: No Ref-Primary, Physician    ED PROVIDER: Butch Pires M.D.      Chief Complaint   Patient presents with    Fall         FINAL IMPRESSION:  1. Subdural hematoma (H)          ED COURSE & MEDICAL DECISION MAKING:    Pertinent Labs & Imaging studies reviewed. (See chart for details)  84 year old male presents to the Emergency Department for evaluation of fall.  Patient fell while trying to transfer from his walker to a chair.  Did strike the left side of his head.  Has left shoulder and left leg pain.  X-rays of the shoulder and leg are negative.  He has a normal neurologic exam for him.  He is at his baseline with mild confusion and cogwheeling due to his Parkinson's disease.  Did get a head CT.  There is an 8 mm left subdural hematoma.  No midline shift.  Discussed with neurosurgery they recommend repeat CT in 6 hours.  Did discuss with the hospitalist here but given no significant neurosurgery coverage he recommended transferring.  Was able to get a bed at [facility].  Patient excepted there.  Will transfer.  I did update patient's daughter, Marni.    10:50 PM I met with the patient to gather history and to perform my initial exam. I discussed the plan for care while in the Emergency Department.   12:13 AM Discussed with Dr. Shelley, Radiology,   Patient has left subdural (8mm) with no shift.   12:27 AM discussed with Kayla from neurosurgery.  Patient okay to stay at [facility].  Would like repeat CT in the morning.  1:57 AM discussed with patient's daughter Marni.  Let her know of possible transfer.  2:02 AM patient accepted by Dr. Vance at [facility] for admission.      At the conclusion of the encounter I discussed the results of all of the tests and the disposition. The questions were answered. The patient or family acknowledged  understanding and was agreeable with the care plan.     Medical Decision Making    transfer    MIPS (CTPE, Dental pain, Felipe, Sinusitis, Asthma/COPD, Head Trauma): Adult Minor Head Trauma:Age 65 years or older    SEPSIS: None        Critical Care     Performed by: Dr Butch Pires  Authorized by: Dr Butch Pires  Total critical care time: 45 minutes  Critical care was necessary to treat or prevent imminent or life-threatening deterioration of the following conditions: Subdural hematoma  Critical care was time spent personally by me on the following activities: development of treatment plan with patient or surrogate, discussions with consultants, examination of patient, evaluation of patient's response to treatment, obtaining history from patient or surrogate, ordering and performing treatments and interventions, ordering and review of laboratory studies, ordering and review of radiographic studies, re-evaluation of patient's condition and monitoring for potential decompensation.  Critical care time was exclusive of separately billable procedures and treating other patients.      MEDICATIONS GIVEN IN THE EMERGENCY:  Medications - No data to display    NEW PRESCRIPTIONS STARTED AT TODAY'S ER VISIT  New Prescriptions    No medications on file          =================================================================    HPI    Patient information was obtained from: Patient and EMS    Edparis Lynn is a 84 year old male with a pertinent history of Parkinson's, hypertension, dementia who presents to this ED for evaluation of fall.  Patient brought in by EMS.  He is at a memory care.  He states he fell when trying to transition from his walker to a chair.  Fell onto his left side.  Currently complaining of left shoulder and left femur pain.  Unsure if he had a loss of consciousness.  He does not believe so.  He does note when he tried to get up he felt dizzy.  This seems like more lightheadedness.  He is not on  blood thinners.  Does have a history of frequent falls.  Patient was admitted in May 2024 for falls and SVT.          PAST MEDICAL HISTORY:  Past Medical History:   Diagnosis Date    Colon polyp     Cough     Depression     Diverticulitis     Head injury     fell working in garden    Hearing loss     Hemorrhoids     HTN (hypertension)     Sinus drainage     coughing spells?       PAST SURGICAL HISTORY:  Past Surgical History:   Procedure Laterality Date    ESOPHAGOSCOPY, GASTROSCOPY, DUODENOSCOPY (EGD), COMBINED  4/21/2014    Procedure: Gastroscopy ;  Surgeon: Bradley Colmenares MD;  Location: [facility]    HERNIA REPAIR, INGUINAL RT/LT      [facility] APPENDECTOMY             CURRENT MEDICATIONS:    No current facility-administered medications for this encounter.     Current Outpatient Medications   Medication Sig Dispense Refill    acetaminophen (TYLENOL) 325 MG tablet Take 650 mg by mouth every 6 hours as needed      aspirin 81 MG EC tablet Take 81 mg by mouth daily      atenolol (TENORMIN) 25 MG tablet Take 1.5 tablets (37.5 mg) by mouth daily      carbidopa-levodopa (SINEMET)  MG tablet Take 2 tablets by mouth 3 times daily 7 am, 1 pm, and 7pm.      donepezil (ARICEPT) 5 MG tablet Take 5 mg by mouth daily      finasteride (PROSCAR) 5 MG tablet Take 1 tablet by mouth every morning      multivitamin, therapeutic (THERA-VIT) TABS tablet Take 1 tablet by mouth daily      NYAMYC 842647 UNIT/GM external powder Apply topically 2 times daily as needed      tamsulosin (FLOMAX) 0.4 MG capsule Take 0.4 mg by mouth every evening      traZODone (DESYREL) 50 MG tablet Take 50 mg by mouth at bedtime      VITAMIN D (CHOLECALCIFEROL) PO Take 2,000 Units by mouth daily      vitamin E (TOCOPHEROL) 200 units (90 mg) capsule Take 200 Units by mouth daily           ALLERGIES:  Allergies   Allergen Reactions    Nka [No Known Allergies]        FAMILY HISTORY:  Family History   Problem Relation Age of Onset    C.A.D. Father     Cancer Brother   "   Neurologic Disorder Brother         parkinsons       SOCIAL HISTORY:   Social History     Socioeconomic History    Marital status:    Tobacco Use    Smoking status: Former    Smokeless tobacco: Never   Substance and Sexual Activity    Alcohol use: Yes     Comment: every day    Drug use: No     Social Drivers of Health     Financial Resource Strain: Low Risk  (2/28/2023)    Received from [facility], [facility]    Financial Resource Strain     Difficulty of Paying Living Expenses: 3   Food Insecurity: No Food Insecurity (2/28/2023)    [facility]    Food Insecurity     Worried About Running Out of Food in the Last Year: 1   Transportation Needs: No Transportation Needs (2/28/2023)    Received from [facility], [facility]    Transportation Needs     Lack of Transportation (Medical): 1    Received from [facility]    Social Connections   Housing Stability: Low Risk  (2/28/2023)    Received from [facility], [facility]    Housing Stability     Unable to Pay for Housing in the Last Year: 1       VITALS:  /62   Pulse 61   Temp 97.8  F (36.6  C) (Oral)   Resp 16   Ht 1.626 m (5' 4\")   Wt 86.2 kg (190 lb)   SpO2 94%   BMI 32.61 kg/m      PHYSICAL EXAM    Physical Exam  Vitals and nursing note reviewed.   Constitutional:       General: He is not in acute distress.     Appearance: He is not diaphoretic.   HENT:      Head:      Comments: Left forehead  Eyes:      General: No scleral icterus.     Pupils: Pupils are equal, round, and reactive to light.   Cardiovascular:      Rate and Rhythm: Normal rate and regular rhythm.      Heart sounds: Normal heart sounds.   Pulmonary:      " Effort: No respiratory distress.      Breath sounds: Normal breath sounds.   Abdominal:      Palpations: Abdomen is soft.      Tenderness: There is no abdominal tenderness.   Musculoskeletal:         General: Tenderness present.      Cervical back: No tenderness.      Comments: Swelling and tenderness of the left anterior shoulder.  Range of motion intact.  Also has abrasions to bilateral knees.  Some tenderness over left mid femur.  Hips range of motion intact without pain.  Otherwise extremity exam normal   Lymphadenopathy:      Cervical: No cervical adenopathy.   Skin:     General: Skin is warm.      Findings: No rash.   Neurological:      General: No focal deficit present.      Comments: Current nerves II through XII grossly intact.  5 out of 5 strength in upper and lower extremities.  There is cogwheeling noted on exam.  Sensation intact.           LAB:  All pertinent labs reviewed and interpreted.  Labs Ordered and Resulted from Time of ED Arrival to Time of ED Departure   BASIC METABOLIC PANEL - Abnormal       Result Value    Sodium 140      Potassium 4.3      Chloride 106      Carbon Dioxide (CO2) 25      Anion Gap 9      Urea Nitrogen 17.5      Creatinine 1.01      GFR Estimate 73      Calcium 8.8      Glucose 104 (*)    TROPONIN T, HIGH SENSITIVITY - Abnormal    Troponin T, High Sensitivity 48 (*)    ROUTINE UA WITH MICROSCOPIC REFLEX TO CULTURE - Abnormal    Color Urine Light Yellow      Appearance Urine Clear      Glucose Urine Negative      Bilirubin Urine Negative      Ketones Urine Trace (*)     Specific Gravity Urine 1.026      Blood Urine 0.2 mg/dL (*)     pH Urine 5.0      Protein Albumin Urine 10 (*)     Urobilinogen Urine Normal      Nitrite Urine Negative      Leukocyte Esterase Urine Negative      Mucus Urine Present (*)     RBC Urine 12 (*)     WBC Urine 1      Squamous Epithelials Urine 1     CBC WITH PLATELETS AND DIFFERENTIAL - Abnormal    WBC Count 10.3      RBC Count 3.99 (*)      Hemoglobin 12.8 (*)     Hematocrit 37.9 (*)     MCV 95      MCH 32.1      MCHC 33.8      RDW 12.4      Platelet Count 144 (*)     % Neutrophils 86      % Lymphocytes 7      % Monocytes 6      % Eosinophils 0      % Basophils 0      % Immature Granulocytes 0      NRBCs per 100 WBC 0      Absolute Neutrophils 8.8 (*)     Absolute Lymphocytes 0.7 (*)     Absolute Monocytes 0.7      Absolute Eosinophils 0.0      Absolute Basophils 0.0      Absolute Immature Granulocytes 0.0      Absolute NRBCs 0.0     TROPONIN T, HIGH SENSITIVITY - Abnormal    Troponin T, High Sensitivity 58 (*)    INR - Normal    INR 1.08     PARTIAL THROMBOPLASTIN TIME - Normal    aPTT 31         RADIOLOGY:  Reviewed all pertinent imaging. Please see official radiology report.  XR Shoulder Left G/E 3 Views   Final Result   IMPRESSION: Normal joint spaces and alignment. No fracture.      XR Femur Left 2 Views   Final Result   IMPRESSION:    1. No visualized acute fracture or malalignment of the left femur.   2. Mild degenerative changes in the left hip joint.   3. No left knee joint effusion.       Head CT w/o contrast   Final Result   IMPRESSION:   1.  Acute left hemispheric subdural hemorrhage measuring up to 8 mm in thickness. No adverse intracranial mass effect.      Findings were discussed with Dr. Pires at 12:11 AM on 4/7/2025.      Head CT w/o contrast    (Results Pending)       EKG:    Performed at: 2314  Impression: Sinus rhythm with left bundle branch block.  There is a PVC and a PAC.  When compared to previous dated May 11, 2024 no ischemic changes.  When compared to previous  Sinus rhythm with a rate of 64.  .  .  QTc 435.    I have independently reviewed and interpreted the EKG(s) documented above.        Butch Pires M.D.  Emergency Medicine  [facility] EMERGENCY ROOM  [address] [id number] [phone]  Dept: [phone]       Butch Pires,  MD  04/07/25 0213

## 2025-04-07 NOTE — H&P
[facility]    History and Physical - Hospitalist Service       Date of Admission:  4/7/2025    Assessment & Plan      Al Lynn is a 84 year old male with Parkinson's disease and dementia who presents after a fall in his memory care facility admitted on 4/7/2025. He was found to have an acute left subdural hematoma without midline shift at [facility] emergency department.  Limited goals of care and would not want to pursue surgical intervention, but transferred to [facility] as care team at [facility] did not feel comfortable managing this.    Left subdural hematoma, acute traumatic:  -Head of bed greater than 30 degrees  -Systolic blood pressure goal less than 150.  As needed hydralazine and labetalol are available if needed  -Fall precautions  -Neurosurgery consulted, aware of patient from outside facility  -Patient agrees with DNR/DNI STATUS and does not believe he would want to pursue surgical intervention if hematoma expanding, but note that he does not appear to have capacity for these medical decisions at this juncture.  Initially he variably answers both that he would desire full code and comfort measures/DNR DNI status.  When I pointed this out, he tells me that he will defer this decision to his daughter, Marni.  -Awaiting arrival of Marni, who I am told is traveling currently from [facility].  Please page when she arrives for further discussion of goals of care.  Note POLST form signed in 2022 indicates a desire for comfort based cares.  -Repeat head CT time for 6 AM.  Okay to repeat anytime between 5:30-8 AM to account for transport availability, etc.  -Every 4 neurochecks and vital signs.  -Addendum: Confirmed with daughter at bedside that patient is DNR/DNI.  Would not want surgical intervention.  -Discontinue aspirin 81 mg daily.  Resumption timing per neurosurgery, likely 6 weeks  -PT consulted    Left forehead abrasion, left shoulder  "abrasion: Patient with pain associated with light touch over area of abrasion.  His shoulder joint itself is not tender.  Shoulder abrasion discomfort with gown or light touch is patient's largest concern currently.  -Wound consult  -For now recommend Vaseline gauze followed by Kerlix/gauze square and paper tape.    Parkinson's disease with dementia:  -Resume prior to admission Sinemet when reconciled by pharmacy  -Fall precautions  -Recommend speech evaluation for dysphagia.  Discussed with daughter on admission.  She tells me that this can be performed through his current memory care facility.  Would focus on aspiration precautions.  Does not need to be completed while in hospital, but consult was placed on admission if he is seen prior to discharge.  -Resume prior to admission Aricept when reconciled by pharmacy    Hypertension:  -Resume prior to admission atenolol when reconciled by pharmacy    BPH:  -Resume prior to admission Proscar and tamsulosin when reconciled by pharmacy          Diet:  NPO until repeat CT scan in AM  DVT Prophylaxis: Pneumatic Compression Devices  Felipe Catheter: Not present  Lines: None     Cardiac Monitoring: None  Code Status:  DNR/DNI.  Discussed with daughter at bedside    Clinically Significant Risk Factors Present on Admission                 # Drug Induced Platelet Defect: home medication list includes an antiplatelet medication   # Hypertension: Noted on problem list           # Obesity: Estimated body mass index is 32.61 kg/m  as calculated from the following:    Height as of 4/6/25: 1.626 m (5' 4\").    Weight as of 4/6/25: 86.2 kg (190 lb).              Disposition Plan     Medically Ready for Discharge: Anticipated Tomorrow           Moiz Skinner MD  Hospitalist Service  [facility]  Securely message with Salma (more info)  Text page via [facility] Paging/Directory     ______________________________________________________________________    Chief " Complaint   Fall, left shoulder and leg pain    History is obtained from the patient, chart review, discussion with daughter at bedside.  Note that patient has Parkinson's disease with dementia and is not able to provide significant history.  He cannot recall the name of his memory care or how long he has lived there.  Tells me that he has been using a walker for the past week when he has had this walker for years.    History of Present Illness   Al Lynn is a 84 year old male who presents as a direct admission from [facility] emergency department for neurosurgical evaluation after a fall at his memory care facility and subdural hematoma.    Patient has known Parkinson's disease and dementia.  He cannot recall where he lives or how long he has been there.  He is not able to make medical decisions at time of his arrival.  Apparently he was ambulating in his apartment when he fell.  He tells me that he was down for some time but cannot elaborate on this.  Was found facedown with abrasions on his shoulder and forehead.  Complained of shoulder and leg pain and was brought to the emergency department for evaluation.    In the emergency department x-rays were negative for fracture injury other than abrasions, but CT of the head demonstrated an 8 mm left subdural hematoma without midline shift.    Neurosurgery was contacted, and as patient had a limited care plan it was felt that he could be monitored at [facility] if there is no desire for surgical intervention.  Declined for admission by the hospitalist team and transferred to [facility].    Here on admission he has notable tremor and cogwheel rigidity, but no focal neurologic deficits are appreciated.  He has tenderness with light touch to an abrasion primarily over his left anterior shoulder.  Does have an abrasion over his more posterior left shoulder as well.  This is his primary complaint.    Daughter lives in [facility], and was  on her way to [facility] at time of admission.  She arrived shortly before 7 AM and I did meet with her to discuss goals of care.  Discussed prior POLST form where patient is noted to be comfort care.  She confirms DNR/DNI status as well as desire not to pursue surgical intervention with a primary goal of comfort.  This said, he more appropriately would be in the selective cares box on POLST form as she would still want to proceed with evaluations, for instance, if he had chest discomfort at his care facility with EKG or x-ray to evaluate for potential etiologies and discuss whether or not to pursue more invasive interventions.  She tells me he is typically very stoic and it was unusual that he complained of leg pain, so appropriate that he was brought to the emergency department in the setting.  He does not have any complaints regarding headache as it might relate to current subdural hematoma.    Discussed pursuing head CT versus not.  Apparently immediately prior to her arrival he actually went down for head CT and this has not resulted.  We discussed and opted on completing head CT as we did not know it was already done in order to evaluate for any significant expansion.  If expansion, this might help with recognizing the risk to patient and his mortality in the coming hours to days, though still no plan for surgical intervention.    Patient and daughter are both hopeful that patient will be able to discharge later this morning.      Past Medical History    Past Medical History:   Diagnosis Date    Colon polyp     Cough     Depression     Diverticulitis     Head injury     fell working in garden    Hearing loss     Hemorrhoids     HTN (hypertension)     Sinus drainage     coughing spells?       Past Surgical History   Past Surgical History:   Procedure Laterality Date    ESOPHAGOSCOPY, GASTROSCOPY, DUODENOSCOPY (EGD), COMBINED  4/21/2014    Procedure: Gastroscopy ;  Surgeon: Bradley Colmenares MD;  Location:  "[facility]    HERNIA REPAIR, INGUINAL RT/LT      ZZC APPENDECTOMY         Prior to Admission Medications   Prior to Admission Medications   Prescriptions Last Dose Informant Patient Reported? Taking?   NYAMYC [id number] UNIT/GM external powder   Yes No   Sig: Apply topically 2 times daily as needed   VITAMIN D (CHOLECALCIFEROL) PO   Yes No   Sig: Take 2,000 Units by mouth daily   acetaminophen (TYLENOL) 325 MG tablet   Yes No   Sig: Take 650 mg by mouth every 6 hours as needed   aspirin 81 MG EC tablet   Yes No   Sig: Take 81 mg by mouth daily   atenolol (TENORMIN) 25 MG tablet   No No   Sig: Take 1.5 tablets (37.5 mg) by mouth daily   carbidopa-levodopa (SINEMET)  MG tablet   Yes No   Sig: Take 2 tablets by mouth 3 times daily 7 am, 1 pm, and 7pm.   donepezil (ARICEPT) 5 MG tablet   Yes No   Sig: Take 5 mg by mouth daily   finasteride (PROSCAR) 5 MG tablet   Yes No   Sig: Take 1 tablet by mouth every morning   multivitamin, therapeutic (THERA-VIT) TABS tablet   Yes No   Sig: Take 1 tablet by mouth daily   tamsulosin (FLOMAX) 0.4 MG capsule   Yes No   Sig: Take 0.4 mg by mouth every evening   traZODone (DESYREL) 50 MG tablet   Yes No   Sig: Take 50 mg by mouth at bedtime   vitamin E (TOCOPHEROL) 200 units (90 mg) capsule   Yes No   Sig: Take 200 Units by mouth daily      Facility-Administered Medications: None           Physical Exam   Vital signs:  Temp: 99  F (37.2  C) Temp src: Oral BP: (!) 140/66 Pulse: 66   Resp: 18 SpO2: 95 % O2 Device: None (Room air)        Estimated body mass index is 32.61 kg/m  as calculated from the following:    Height as of 4/6/25: 1.626 m (5' 4\").    Weight as of 4/6/25: 86.2 kg (190 lb).    General Appearance: Robust for age 84-year-old male with overt parkinsonism  Eyes: No scleral icterus or injection  HEENT: Abrasion over left forehead  Respiratory: Breath sounds are clear bilaterally to auscultation without wheezes or crackles.  Cardiovascular: Regular rate and rhythm.  Soft early " systolic murmur best appreciated at apex  GI: Abdomen soft and nontender to palpation without palpable mass.  Skin: Abrasion over left forehead, left anterior and posterior shoulder  Musculoskeletal: Muscular tone and bulk generally intact in all extremities and appropriate for age cogwheel rigidity of upper extremities.  Neurologic: No focal neurologic deficit is appreciated, but patient with significant tremulousness and cogwheel rigidity.  Missed his evening Sinemet dose at outside emergency department.  Hypophonia and masked facies.  Does not have decisional capacity for determining CODE STATUS.  Uncertain of the name of the location where he lives or how long he has been there.  Tells me that he has used a walker for the past week, but he has had this walker for years per his daughter.  Psychiatric: Pleasant, normal affect    Medical Decision Making       75 MINUTES SPENT BY ME on the date of service doing chart review, history, exam, documentation & further activities per the note.   Met with patient initially, later met with daughter when she arrived 4/7 AM to discuss goals of care.    Data     I have personally reviewed the following data over the past 24 hrs:    10.3  \   12.8 (L)   / 144 (L)     140 106 17.5 /  104 (H)   4.3 25 1.01 \     Trop: 56 (H) BNP: N/A     INR:  1.08 PTT:  31   D-dimer:  N/A Fibrinogen:  N/A       Imaging results reviewed over the past 24 hrs:   Recent Results (from the past 24 hours)   Head CT w/o contrast    Narrative    EXAM: CT HEAD W/O CONTRAST  LOCATION: [facility]  DATE: 4/6/2025    INDICATION: Head injury. Fall.  COMPARISON: 5/8/2024.  TECHNIQUE: Routine CT Head without IV contrast. Multiplanar reformats. Dose reduction techniques were used.    FINDINGS:  INTRACRANIAL CONTENTS: Acute left hemispheric subdural hemorrhage measures up to 8 mm in thickness along the left frontal lobe. No midline shift or effacement of basal cisterns. No CT evidence of  acute infarct. Mild to moderate presumed chronic small   vessel ischemic changes. Mild to moderate generalized volume loss. No hydrocephalus.     VISUALIZED ORBITS/SINUSES/MASTOIDS: Prior bilateral cataract surgery. Visualized portions of the orbits are otherwise unremarkable. No paranasal sinus mucosal disease. Right mastoid effusion.    BONES/SOFT TISSUES: Left frontal scalp swelling. No acute calvarial fracture.      Impression    IMPRESSION:  1.  Acute left hemispheric subdural hemorrhage measuring up to 8 mm in thickness. No adverse intracranial mass effect.    Findings were discussed with Dr. Pires at 12:11 AM on 4/7/2025.   XR Femur Left 2 Views    Narrative    EXAM: LEFT FEMUR 4 VIEWS  LOCATION: [facility]  DATE/TIME: 4/7/2025 12:22 AM CDT    INDICATION: Fall. Pain.  COMPARISON: None.      Impression    IMPRESSION:   1. No visualized acute fracture or malalignment of the left femur.  2. Mild degenerative changes in the left hip joint.  3. No left knee joint effusion.    XR Shoulder Left G/E 3 Views    Narrative    EXAM: XR SHOULDER LEFT G/E 3 VIEWS  LOCATION: [facility]  DATE: 4/7/2025    INDICATION: fall, shoulder pain  COMPARISON: None.      Impression    IMPRESSION: Normal joint spaces and alignment. No fracture.

## 2025-04-07 NOTE — CONSULTS
Care Management Initial Consult    General Information  Assessment completed with: Children, VM-chart reviewMarni  Type of CM/SW Visit: Initial Assessment    Primary Care Provider verified and updated as needed: Yes   Readmission within the last 30 days:        Reason for Consult: discharge planning  Advance Care Planning: Advance Care Planning Reviewed: present on chart          Communication Assessment  Patient's communication style: spoken language (English or Bilingual)             Cognitive  Cognitive/Neuro/Behavioral: .WDL except, orientation  Level of Consciousness: intermittent confusion  Arousal Level: opens eyes spontaneously  Orientation: disoriented to, time  Mood/Behavior: cooperative  Best Language: 0 - No aphasia  Speech: clear, spontaneous    Living Environment:   People in home: facility resident     Current living Arrangements: assisted living  Name of Facility: [facility]   Able to return to prior arrangements: yes       Family/Social Support:  Care provided by: self, other (see comments)  Provides care for: no one  Marital Status:   Support system:            Description of Support System:           Current Resources:   Patient receiving home care services: No        Community Resources:    Equipment currently used at home: walker, standard  Supplies currently used at home:      Employment/Financial:  Employment Status: retired        Financial Concerns:             Does the patient's insurance plan have a 3 day qualifying hospital stay waiver?  Yes     Which insurance plan 3 day waiver is available? Alternative insurance waiver    Will the waiver be used for post-acute placement? No    Lifestyle & Psychosocial Needs:  Social Drivers of Health     Food Insecurity: No Food Insecurity (2/28/2023)    Received from [employer] [facility] [employer], [employer] [facility] [employer] [facility]    Food Insecurity     Worried About Running Out of Food in the Last Year: 1    Depression: Not at risk (3/1/2021)    Received from [facility] [employer] [facility], [facility]    PHQ-2     PHQ-2 TOTAL SCORE: 0   Housing Stability: Low Risk  (2/28/2023)    Received from [facility], [facility]    Housing Stability     Unable to Pay for Housing in the Last Year: 1   Tobacco Use: Medium Risk (7/25/2023)    Received from [facility], [facility]    Patient History     Smoking Tobacco Use: Former     Smokeless Tobacco Use: Never     Passive Exposure: Not on file   Financial Resource Strain: Low Risk  (2/28/2023)    Received from [facility], [facility]    Financial Resource Strain     Difficulty of Paying Living Expenses: 3     Difficulty of Paying Living Expenses: Not on file   Alcohol Use: Not on file   Transportation Needs: No Transportation Needs (2/28/2023)    Received from [facility], [facility]    Transportation Needs     Lack of Transportation (Medical): 1   Physical Activity: Not on file   Interpersonal Safety: Not on file   Stress: Not on file   Social Connections: Unknown (3/1/2024)    Received from [facility] [employer] [facility] [email or web address] [facility]    Social Connections     Frequency of Communication with Friends and Family: Not on file   Health Literacy: Not on file       Functional Status:  Prior to admission patient needed assistance:   Dependent ADLs:: Ambulation-walker, Bathing  Dependent IADLs:: Cleaning, Cooking, Laundry, Shopping, Meal Preparation, Medication Management, Transportation, Money Management       Mental Health Status:          Chemical Dependency Status:                Values/Beliefs:  Spiritual, Cultural Beliefs, Jew Practices, Values that affect care:                  Discussed  Partnership in Safe Discharge Planning  document with patient/family: No    Additional Information:  Met with patient and daughter Marni at bedside to discuss role in discharge planning. Pt lives at [facility].  Pt receives help with medications, vitals, showers (staff provide SBA).  Pt  does own dressing. Normally walks indep with walker.  Pt is not receiving any outside therapies (he has in the past).  HCD on file and daughter voices no financial concerns.  Daughter feels Pt is at baseline mobility and does not feel he needs PT eval.  Would like to transition back to [facility] (hopefully today).  Daughter can provide transport    CC called [facility]  [phone] and left message for Marlen(message stated was director)  Previous notes indicate fax as [phone]    Need to confirm fax number and preferred pharmacy    Next Steps: Await call back from [facility]/confirm discharge plan.     Tricia Mcmahon RN

## 2025-04-07 NOTE — ED NOTES
Bed: [facility]14  Expected date: 4/6/25  Expected time: 10:41 PM  Means of arrival:   Comments:  84 M, Fall, head lac, no thinners

## 2025-04-07 NOTE — PLAN OF CARE
Goal Outcome Evaluation:      Plan of Care Reviewed With: patient, child          Outcome Evaluation: Back to [facility]

## 2025-04-07 NOTE — DISCHARGE INSTRUCTIONS
Recommend follow up in 1 month with repeat head CT prior. Continue to hold ASA until seen in clinic.  NSGY to facilitate scheduling appointment. Navigator complete, schedulers messaged.     WOC DISCHARGE INSTRUCTIONS:  Left shoulder abrasion  Clean with Mikroklenz, pat dry.   Cover open wound with oil emulsion gauze then mepilex (silicone bordered foam)  Change every 5 days and as needed  Call MD for wound decline    Left forehead apply Sween Cream or Aquaphor to abrasion area daily

## 2025-04-07 NOTE — PROGRESS NOTES
"Clinical Swallow Evaluation (CSE):     04/07/25 1116   Appointment Info   Signing Clinician's Name / Credentials (SLP) Betzaida Farris MS CCC-SLP   General Information   Onset of Illness/Injury or Date of Surgery 04/07/25   Referring Physician Skinner, Moiz Hadley MD   Patient/Family Therapy Goal Statement (SLP) To eat and go home   Pertinent History of Current Problem   Per H&P: \"Al Lynn is a 84 year old male with Parkinson's disease and dementia who presents after a fall in his memory care facility admitted on 4/7/2025. He was found to have an acute left subdural hematoma without midline shift at [facility] emergency department.  Limited goals of care and would not want to pursue surgical intervention, but transferred to [facility] as care team at [facility] did not feel comfortable managing this.\"     SLP for swallow eval, passed RN dysphagia screen but hospitalist told RN NPO until SLP.     General Observations Pt alert, upright in bed, dtr present.   Pain Assessment   Patient Currently in Pain No   Type of Evaluation   Type of Evaluation Swallow Evaluation   Oral Motor   Oral Musculature generally intact   Structural Abnormalities none present   Mucosal Quality good   Vocal Quality/Secretion Management (Oral Motor)   Vocal Quality (Oral Motor) WNL   Secretion Management (Oral Motor) WNL   General Swallowing Observations   Past History of Dysphagia   No hx oropharygneal dysphagia per EMR or pt/dtr report. Tolerates regular/thin diet at baseline with no concerns, only avoids lettuce.     Esophagram/upper GI xray in 2017 did reveal \"There is esophageal dysmotility with tertiary contractions.    There is narrowing of the distal esophagus with a Schatzki s ring noted.    Gastric mucosal folds grossly unremarkable.  No evidence of transient   gastroesophageal reflux.  Pylorus and first portion of duodenum grossly   unremarkable.\"     Respiratory Support room air   Current Diet/Method of " Nutritional Intake (General Swallowing Observations, NIS) NPO   Swallowing Evaluation Clinical swallow evaluation   Clinical Swallow Evaluation   Feeding Assistance no assistance needed   Clinical Swallow Evaluation Textures Trialed thin liquids;pureed;solid foods   Clinical Swallow Eval: Thin Liquid Texture Trial   Mode of Presentation, Thin Liquids cup;straw;self-fed   Volume of Liquid or Food Presented 6 oz + 3 meds whole with water via R   Oral Phase of Swallow WFL   Pharyngeal Phase of Swallow intact   Diagnostic Statement no overt clinical signs/sx aspiration noted   Clinical Swallow Evaluation: Puree Solid Texture Trial   Mode of Presentation, Puree spoon;self-fed   Volume of Puree Presented 4 oz   Oral Phase, Puree WFL   Pharyngeal Phase, Puree intact   Diagnostic Statement no overt clinical signs/sx aspiration noted   Clinical Swallow Evaluation: Solid Food Texture Trial   Mode of Presentation self-fed   Volume Presented 3 yobani crackers   Oral Phase WFL   Pharyngeal Phase intact   Diagnostic Statement no overt clinical signs/sx aspiration noted   Esophageal Phase of Swallow   Patient reports or presents with symptoms of esophageal dysphagia No   Swallowing Recommendations   Diet Consistency Recommendations regular diet;thin liquids (level 0)   Instrumental Assessment Recommendations instrumental evaluation not recommended at this time   Clinical Impression   Criteria for Skilled Therapeutic Interventions Met (SLP Eval) Evaluation only;No problems identified which require skilled intervention   SLP Diagnosis no appreciable oropharyngeal dysphagia   Risks & Benefits of therapy have been explained evaluation/treatment results reviewed;care plan/treatment goals reviewed;risks/benefits reviewed;current/potential barriers reviewed;participants voiced agreement with care plan;participants included;patient;daughter   Clinical Impression Comments   Clinical swallow evaluation completed. Pt currently presents with  no appreciable oropharyngeal dysphagia, WFL swallow for regular/thin diet. The following appear WFL: oromotor exam, labial seal, oral containment, mastication, oral clearance. No percievable oral holding or delay in swallow. Robust laryngeal elevation to palpation. No overt clinical signs/sx aspiration noted. Pt denies any issues/concerns with swallowing. Educated pt/dtr on potential for dysphagia with progression of parkinson's and to closely monitor, seek reconsult if any concerns arise. They verbalized understanding.     SLP Total Evaluation Time   Eval: oral/pharyngeal swallow function, clinical swallow Minutes (34608) 10   SLP Discharge Planning   SLP Plan eval only   SLP Discharge Recommendation home  (memory care)   SLP Rationale for DC Rec pt at baseline re: swallow function   SLP Brief overview of current status  Regular/thin when upright, slow rate, single bites/sips at a time, liquid wash PRN   SLP Time and Intention   Total Session Time (sum of timed and untimed services) 10

## 2025-04-07 NOTE — PROVIDER NOTIFICATION
Brief note, history and physical dictation pending:    Patient presented as a direct admission from [facility] emergency department after a fall at his memory care facility, [facility].    Patient has Parkinson's disease, frequent falls, dementia.    Found to have an 8 mm left subdural without midline shift on imaging.  POLST form was comfort care and neurosurgery felt that patient could be observed at [facility], but hospital team was not comfortable with this and transferred to [facility].    Daughter Marni here this morning around 6:30 AM.  Met with her at bedside.    Patient has apparently already gone for head CT, not yet resulted, just before she arrived.    Discussed goals of care.  Patient himself would not want surgical intervention, and she agrees with this.  His wife  this past December and he remains living in memory care.  He does not have decisional capacity for goals of care such as CODE STATUS or surgical intervention, but is agreeable to his daughter helping with these decisions.    Will obtain head CT.  If significant worsening, change of management might be ongoing monitoring of blood pressure as well as planning for daughter if there is concern that this may continue to progress to the point where death might be imminent.  I do not anticipate this.    They would not like to pursue any surgical intervention on hematoma    Clarified his prior POLST from .    Generally, at this point she would actually like patient to be selective cares.  He did have some leg pain after his fall, and would want evaluation and imaging to rule out fractures if this were to occur in the future.  Similarly, if he complained of discomfort such as chest pain, she would want an EKG or basic evaluation to make a decision regarding pursuing additional interventions or not.  Very close to complete comfort cares, however.  She recognizes that if patient did not have a complaint but had an acute event such as  a fall at his care facility she would not want him transferred to the emergency department.    Initially ordered speech pathology consultation for dysphagia.  Daughter tells me that this can be performed at his care facility.  Has undergone some speech therapy in the past, but not aspiration precaution teaching, and he might benefit from this.      When head CT imaging returns, have requested neurosurgery be paged and context of no operative intervention be given.  Patient is hopeful to return home, as his daughter, late this morning or early afternoon.    Moiz Skinner MD  7:08 AM

## 2025-04-07 NOTE — PLAN OF CARE
Pt here with SDH    Discharging back to home, daughter to transport. Follow ups gone over. Belongings sent with patient.

## 2025-04-07 NOTE — DISCHARGE SUMMARY
[facility]    Discharge Summary  Hospitalist    Date of Admission:  4/7/2025  Date of Discharge:  4/7/2025 12:18 PM  Discharging Provider: Raj Nguyen MD, MD  Date of Service (when I saw the patient): 04/07/25    Discharge Diagnoses   Please refer to below    History of Present Illness   Al Lynn is an 84 year old male who presented with fall and subdural hematoma    Hospital Course   Al Lynn is a 84 year old male with Parkinson's disease and dementia who presents after a fall in his memory care facility admitted on 4/7/2025. He was found to have an acute left subdural hematoma without midline shift at [facility] emergency department.  Limited goals of care and would not want to pursue surgical intervention, but transferred to [facility] for further management    Final discharge diagnoses and hospital course     Left subdural hematoma, acute traumatic: Stable    This is a 84-year-old male with history of Parkinson's disease and dementia was admitted with history of fall.  He found to have left subdural hematoma.  He was admitted, he remained stable, CT scan of the head repeated in the morning on 4/7/2025, showed slightly decreased thickness of the left for middle convexity subdural hematoma.  No superimposed acute intercranial normality.    Patient was evaluated by neurosurgery, recommend follow-up in 1 month, and continue to hold aspirin.  Patient was evaluated by the speech in clinic for regular diet.  At this time the patient is feeling otherwise well, cleared by the neurosurgeon, the patient will be discharged to memory care unit/assisted living in stable improved condition.  Will schedule home care PT and OT    At this time he recommend to have good control of his blood pressure, target systolic blood pressure less than 150.  Avoid aspirin till follow-up with neurosurgery in 1 month.  Patient neurochecks remained stable.  Patient will be  "discharged back to his facility in stable improved condition.      Left forehead abrasion, left shoulder abrasion: Patient with pain associated with light touch over area of abrasion.  His shoulder joint itself is not tender.  Shoulder abrasion discomfort with gown or light touch is patient's largest concern currently.  -Wound consult  -For now recommend Vaseline gauze followed by Kerlix/gauze square and paper tape.  Was seen by WOC while in the hospital.     Parkinson's disease with dementia:  -Resume prior to admission Sinemet   -Fall precautions  \Resume PTA medication on discharge.      Hypertension:  -Resume prior to admission medication discharge.     BPH:  -Resume prior to admission Proscar and tamsulosin            Code Status:  DNR/DNI.  Discussed with daughter at bedside    At this time the patient is stable, follow-up CT scan remains stable, cleared by the neurosurgery.  Patient family wants him to be discharged back to his facility, he will be discharged back to his memory care unit/assisted living in stable improved condition.  Resume PTA medication discharge.  Follow-up with the neurosurgeon in 1 month  Continue hold aspirin to be seen by the neurosurgery outpatient clinic.     Clinically Significant Risk Factors Present on Admission                 # Drug Induced Platelet Defect: home medication list includes an antiplatelet medication   # Hypertension: Noted on problem list           # Obesity: Estimated body mass index is 32.61 kg/m  as calculated from the following:    Height as of 4/6/25: 1.626 m (5' 4\").    Weight as of 4/6/25: 86.2 kg (190 lb).                    Raj Nguyen MD, MD    Significant Results and Procedures       Pending Results   These results will be followed up by   Unresulted Labs Ordered in the Past 30 Days of this Admission       No orders found from 3/8/2025 to 4/8/2025.            Code Status   DNR / DNI       Primary Care Physician   Kamran Gutiérrez    Physical Exam   Temp: " 98.4  F (36.9  C) Temp src: Oral BP: (!) 147/62 Pulse: 59   Resp: 18 SpO2: 97 % O2 Device: None (Room air)    There were no vitals filed for this visit.  Vital Signs with Ranges  Temp:  [97.8  F (36.6  C)-99  F (37.2  C)] 98.4  F (36.9  C)  Pulse:  [48-72] 59  Resp:  [16-23] 18  BP: (130-147)/(61-68) 147/62  SpO2:  [94 %-97 %] 97 %  No intake/output data recorded.    Constitutional: awake, alert, cooperative, no apparent distress, and appears stated age  Eyes: Lids and lashes normal, pupils equal, round and reactive to light, extra ocular muscles intact, sclera clear, conjunctiva normal  Respiratory: No increased work of breathing, good air exchange, clear to auscultation bilaterally, no crackles or wheezing  Cardiovascular: Normal apical impulse, regular rate and rhythm, normal S1 and S2, no S3 or S4, and no murmur noted  GI: No scars, normal bowel sounds, soft, non-distended, non-tender, no masses palpated, no hepatosplenomegally  Neurologic: Have resting tremors, no focal deficit    Discharge Disposition   Discharged to assisted living  Condition at discharge: Stable    Consultations This Hospital Stay   NEUROSURGERY IP CONSULT  WOUND OSTOMY CONTINENCE NURSE  IP CONSULT  SPEECH LANGUAGE PATH ADULT IP CONSULT  PHYSICAL THERAPY ADULT IP CONSULT  SPEECH LANGUAGE PATH ADULT IP CONSULT  OCCUPATIONAL THERAPY ADULT IP CONSULT  PHYSICAL THERAPY ADULT IP CONSULT  CARE MANAGEMENT / SOCIAL WORK IP CONSULT    Time Spent on this Encounter   IRaj MD, personally saw the patient today and spent greater than 30 minutes discharging this patient.    Discharge Orders      CT Head w/o contrast*     Home Care Referral      Follow Up and recommended labs and tests    Follow up with Nursing home physician.  No follow up labs or test are needed.     Reason for your hospital stay    SDH     Activity - Up with nursing assistance     Activity - Up with assistive device     Follow Up    Please follow up at [facility]  Neurosurgery in 1 month with a nurse for wellbeing and wound check visit as well in 6 weeks with the provider. Please call our schedulers at [phone] to schedule your appointment or for any questions or concerns.     Activity    Activity as tolerated    Recommend avoiding falls when able    Recommend holding all anticoagulation including Aspirin and NSAIDs (ibuprofen, advil, aleeve) until evaluated in outpatient neurosurgery clinic.     Please contact NSGY clinic via mychart or telephone for questions, concerns, scheduling assistance.     Red flag symptoms and when to seek medical attention: any increased headaches, dizziness, nausea/vomiting, vision/speech changes, weakness, confusion, seizure activity, or other neurological changes.     Activity    Your activity upon discharge: activity as tolerated     No CPR- Do NOT Intubate     Speech Language Path Adult Consult    Evaluate and treat as clinically indicated.    Reason:  parkinson's     Fall precautions     Diet    Follow this diet upon discharge: Current Diet:Orders Placed This Encounter      Regular Diet Adult     Hospital Follow-up with Existing Primary Care Provider (PCP)          Discharge Medications   Discharge Medication List as of 4/7/2025 12:02 PM        CONTINUE these medications which have NOT CHANGED    Details   acetaminophen (TYLENOL) 325 MG tablet Take 650 mg by mouth every 6 hours as needed, Historical      atenolol (TENORMIN) 25 MG tablet Take 1.5 tablets (37.5 mg) by mouth daily, Transitional      carbidopa-levodopa (SINEMET)  MG tablet Take 2 tablets by mouth 3 times daily 7 am, 1 pm, and 7pm., Historical      donepezil (ARICEPT) 5 MG tablet Take 5 mg by mouth daily, Historical      finasteride (PROSCAR) 5 MG tablet Take 1 tablet by mouth every morning, Historical      multivitamin, therapeutic (THERA-VIT) TABS tablet Take 1 tablet by mouth daily, Historical      NYAMYC 027660 UNIT/GM external powder Apply topically 2 times daily as  needed, TAMMIE, Historical      tamsulosin (FLOMAX) 0.4 MG capsule Take 0.4 mg by mouth every evening, Historical      traZODone (DESYREL) 50 MG tablet Take 50 mg by mouth at bedtime, Historical      VITAMIN D (CHOLECALCIFEROL) PO Take 2,000 Units by mouth daily, Historical      vitamin E (TOCOPHEROL) 200 units (90 mg) capsule Take 200 Units by mouth daily, Historical           STOP taking these medications       aspirin 81 MG EC tablet Comments:   Reason for Stopping:             Allergies   Allergies   Allergen Reactions    Nka [No Known Allergies]      Data   Most Recent 3 CBC's:  Recent Labs   Lab Test 04/06/25  2318 01/08/25  1020 05/29/24  0943   WBC 10.3 8.3 8.0   HGB 12.8* 13.9 13.7   MCV 95 99 98   * 161 168      Most Recent 3 BMP's:  Recent Labs   Lab Test 04/06/25  2318 01/08/25  1020 05/29/24  0943    139 137   POTASSIUM 4.3 4.4 4.5   CHLORIDE 106 102 103   CO2 25 27 23   BUN 17.5 19.3 14.2   CR 1.01 1.04 0.98   ANIONGAP 9 10 11   EMILE 8.8 9.0 8.9   * 95 109*     Most Recent 2 LFT's:  Recent Labs   Lab Test 05/11/24  1919   AST 28   ALT <5   ALKPHOS 58   BILITOTAL 0.5     Most Recent INR's and Anticoagulation Dosing History:  Anticoagulation Dose History          Latest Ref Rng & Units 4/7/2025   Recent Dosing and Labs   INR 0.85 - 1.15 1.08      Most Recent 3 Troponin's:No lab results found.  Most Recent Cholesterol Panel:No lab results found.  Most Recent 6 Bacteria Isolates From Any Culture (See EPIC Reports for Culture Details):No lab results found.  Most Recent TSH, T4 and A1c Labs:  Recent Labs   Lab Test 01/08/25  1020 01/31/24  1220 02/22/22  1931   TSH 3.12   < > 5.26*   T4  --   --  0.74    < > = values in this interval not displayed.     Results for orders placed or performed during the hospital encounter of 04/07/25   CT Head w/o Contrast    Narrative    EXAM: CT HEAD W/O CONTRAST  LOCATION: [facility]  DATE: 4/7/2025    INDICATION: Follow up  subdural  COMPARISON: 4/6/2025  TECHNIQUE: Routine CT Head without IV contrast. Multiplanar reformats. Dose reduction techniques were used.    FINDINGS:  INTRACRANIAL CONTENTS: Slightly decreased size of the hyperattenuating subdural hematoma overlying the lateral left frontal lobe, likely due to redistribution, now measuring 6 mm, previously 8 mm. No new hemorrhage or abnormal extra axial fluid   collection. No CT evidence of acute infarct. Mild presumed chronic small vessel ischemic changes. Moderate generalized volume loss. No hydrocephalus.     VISUALIZED ORBITS/SINUSES/MASTOIDS: No intraorbital abnormality. No paranasal sinus mucosal disease. No middle ear or mastoid effusion.    BONES/SOFT TISSUES: No acute abnormality.      Impression    IMPRESSION:  1.  Slightly decreased thickness of the left foraminal convexity subdural hematoma.  2.  No superimposed acute intracranial abnormality.     Most Recent 3 CBC's:  Recent Labs   Lab Test 04/06/25 2318 01/08/25  1020 05/29/24  0943   WBC 10.3 8.3 8.0   HGB 12.8* 13.9 13.7   MCV 95 99 98   * 161 168     Most Recent 3 BMP's:  Recent Labs   Lab Test 04/06/25 2318 01/08/25  1020 05/29/24  0943    139 137   POTASSIUM 4.3 4.4 4.5   CHLORIDE 106 102 103   CO2 25 27 23   BUN 17.5 19.3 14.2   CR 1.01 1.04 0.98   ANIONGAP 9 10 11   EMILE 8.8 9.0 8.9   * 95 109*     Most Recent 2 LFT's:  Recent Labs   Lab Test 05/11/24  1919   AST 28   ALT <5   ALKPHOS 58   BILITOTAL 0.5

## 2025-04-09 ENCOUNTER — LAB REQUISITION (OUTPATIENT)
Dept: LAB | Facility: CLINIC | Age: 85
End: 2025-04-09
Payer: COMMERCIAL

## 2025-04-09 DIAGNOSIS — R30.0 DYSURIA: ICD-10-CM

## 2025-04-09 LAB
ALBUMIN UR-MCNC: 20 MG/DL
AMORPH CRY #/AREA URNS HPF: ABNORMAL /HPF
APPEARANCE UR: ABNORMAL
BACTERIA #/AREA URNS HPF: ABNORMAL /HPF
BILIRUB UR QL STRIP: NEGATIVE
COLOR UR AUTO: ABNORMAL
GLUCOSE UR STRIP-MCNC: NEGATIVE MG/DL
HGB UR QL STRIP: NEGATIVE
KETONES UR STRIP-MCNC: ABNORMAL MG/DL
LEUKOCYTE ESTERASE UR QL STRIP: NEGATIVE
MUCOUS THREADS #/AREA URNS LPF: PRESENT /LPF
NITRATE UR QL: NEGATIVE
PH UR STRIP: 5.5 [PH] (ref 5–7)
RBC URINE: <1 /HPF
SP GR UR STRIP: 1.03 (ref 1–1.03)
UROBILINOGEN UR STRIP-MCNC: NORMAL MG/DL
WBC URINE: 1 /HPF

## 2025-04-09 PROCEDURE — 81001 URINALYSIS AUTO W/SCOPE: CPT | Mod: ORL

## 2025-04-11 ENCOUNTER — APPOINTMENT (OUTPATIENT)
Dept: CT IMAGING | Facility: CLINIC | Age: 85
End: 2025-04-11
Payer: COMMERCIAL

## 2025-04-11 ENCOUNTER — HOSPITAL ENCOUNTER (EMERGENCY)
Facility: CLINIC | Age: 85
Discharge: HOME | End: 2025-04-11
Payer: COMMERCIAL

## 2025-04-11 VITALS
DIASTOLIC BLOOD PRESSURE: 63 MMHG | SYSTOLIC BLOOD PRESSURE: 132 MMHG | OXYGEN SATURATION: 98 % | HEART RATE: 61 BPM | RESPIRATION RATE: 27 BRPM | TEMPERATURE: 98.8 F

## 2025-04-11 DIAGNOSIS — S09.90XA CLOSED HEAD INJURY, INITIAL ENCOUNTER: ICD-10-CM
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
## 2025-04-11 DIAGNOSIS — R41.82 ALTERED MENTAL STATUS, UNSPECIFIED ALTERED MENTAL STATUS TYPE: ICD-10-CM

## 2025-04-11 LAB
ANION GAP SERPL CALCULATED.3IONS-SCNC: 8 MMOL/L (ref 7–15)
ATRIAL RATE - MUSE: 72 BPM
BASOPHILS # BLD AUTO: 0 10E3/UL (ref 0–0.2)
BASOPHILS NFR BLD AUTO: 0 %
BUN SERPL-MCNC: 18.6 MG/DL (ref 8–23)
CALCIUM SERPL-MCNC: 8.9 MG/DL (ref 8.8–10.4)
CHLORIDE SERPL-SCNC: 105 MMOL/L (ref 98–107)
CREAT SERPL-MCNC: 1.21 MG/DL (ref 0.67–1.17)
DIASTOLIC BLOOD PRESSURE - MUSE: 59 MMHG
EGFRCR SERPLBLD CKD-EPI 2021: 59 ML/MIN/1.73M2
EOSINOPHIL # BLD AUTO: 0 10E3/UL (ref 0–0.7)
EOSINOPHIL NFR BLD AUTO: 0 %
ERYTHROCYTE [DISTWIDTH] IN BLOOD BY AUTOMATED COUNT: 12.9 % (ref 10–15)
GLUCOSE SERPL-MCNC: 101 MG/DL (ref 70–99)
HCO3 SERPL-SCNC: 27 MMOL/L (ref 22–29)
HCT VFR BLD AUTO: 36.9 % (ref 40–53)
HGB BLD-MCNC: 12.3 G/DL (ref 13.3–17.7)
IMM GRANULOCYTES # BLD: 0 10E3/UL
IMM GRANULOCYTES NFR BLD: 0 %
INTERPRETATION ECG - MUSE: NORMAL
LYMPHOCYTES # BLD AUTO: 0.7 10E3/UL (ref 0.8–5.3)
LYMPHOCYTES NFR BLD AUTO: 8 %
MAGNESIUM SERPL-MCNC: 2.2 MG/DL (ref 1.7–2.3)
MCH RBC QN AUTO: 32.2 PG (ref 26.5–33)
MCHC RBC AUTO-ENTMCNC: 33.3 G/DL (ref 31.5–36.5)
MCV RBC AUTO: 97 FL (ref 78–100)
MONOCYTES # BLD AUTO: 0.6 10E3/UL (ref 0–1.3)
MONOCYTES NFR BLD AUTO: 7 %
NEUTROPHILS # BLD AUTO: 7.9 10E3/UL (ref 1.6–8.3)
NEUTROPHILS NFR BLD AUTO: 85 %
NRBC # BLD AUTO: 0 10E3/UL
NRBC BLD AUTO-RTO: 0 /100
NT-PROBNP SERPL-MCNC: 788 PG/ML (ref 0–1800)
P AXIS - MUSE: 43 DEGREES
PLATELET # BLD AUTO: 153 10E3/UL (ref 150–450)
POTASSIUM SERPL-SCNC: 4.7 MMOL/L (ref 3.4–5.3)
PR INTERVAL - MUSE: 184 MS
QRS DURATION - MUSE: 126 MS
QT - MUSE: 402 MS
QTC - MUSE: 440 MS
R AXIS - MUSE: -63 DEGREES
RBC # BLD AUTO: 3.82 10E6/UL (ref 4.4–5.9)
SODIUM SERPL-SCNC: 140 MMOL/L (ref 135–145)
SYSTOLIC BLOOD PRESSURE - MUSE: 108 MMHG
T AXIS - MUSE: 12 DEGREES
TROPONIN T SERPL HS-MCNC: 31 NG/L
TROPONIN T SERPL HS-MCNC: 33 NG/L
VENTRICULAR RATE- MUSE: 72 BPM
WBC # BLD AUTO: 9.3 10E3/UL (ref 4–11)

## 2025-04-11 PROCEDURE — 85004 AUTOMATED DIFF WBC COUNT: CPT

## 2025-04-11 PROCEDURE — 85041 AUTOMATED RBC COUNT: CPT

## 2025-04-11 PROCEDURE — 99285 EMERGENCY DEPT VISIT HI MDM: CPT | Mod: 25

## 2025-04-11 PROCEDURE — 93005 ELECTROCARDIOGRAM TRACING: CPT

## 2025-04-11 PROCEDURE — 82565 ASSAY OF CREATININE: CPT

## 2025-04-11 PROCEDURE — 72125 CT NECK SPINE W/O DYE: CPT

## 2025-04-11 PROCEDURE — 84484 ASSAY OF TROPONIN QUANT: CPT

## 2025-04-11 PROCEDURE — 36415 COLL VENOUS BLD VENIPUNCTURE: CPT

## 2025-04-11 PROCEDURE — 83735 ASSAY OF MAGNESIUM: CPT

## 2025-04-11 PROCEDURE — 80048 BASIC METABOLIC PNL TOTAL CA: CPT

## 2025-04-11 PROCEDURE — 83880 ASSAY OF NATRIURETIC PEPTIDE: CPT

## 2025-04-11 PROCEDURE — 70450 CT HEAD/BRAIN W/O DYE: CPT

## 2025-04-11 RX ORDER — CITALOPRAM HYDROBROMIDE 10 MG/1
10 TABLET ORAL DAILY
COMMUNITY

## 2025-04-11 ASSESSMENT — ACTIVITIES OF DAILY LIVING (ADL)
ADLS_ACUITY_SCORE: 56

## 2025-04-11 NOTE — ED NOTES
Pt and family stated that pt normally walks with a walker. Writer did ambulation trial with pt, gait belt and walker. Pt stat: 98% room air, pt ambulated with walker independently and very direct able.

## 2025-04-11 NOTE — ED PROVIDER NOTES
EMERGENCY DEPARTMENT ENCOUNTER      NAME: Al Lynn  AGE: 84 year old male  YOB: 1940  MRN: [id number]  EVALUATION DATE & TIME: 4/11/2025 10:45 AM    PCP: Kamran Gutiérrez    ED PROVIDER: Ariel Garcia MD    FINAL IMPRESSION:  1. Fall, initial encounter    2. Altered mental status, unspecified altered mental status type    3. Closed head injury, initial encounter        ED COURSE & MEDICAL DECISION MAKING:    Pertinent Labs & Imaging studies reviewed. (See chart for details)  84 year old male presents to the Emergency Department for evaluation of altered mental status and falls.  Differential diagnosis considered Alcohol intoxication, alcohol withdrawal, electrolyte disturbance, hepatic encephalopathy, hypertensive encephalopathy, hypoglycemia, hyperglycemia, opioid overdose, uremia, traumatic blood loss, toxic ingestion, brain tumor, thyrotoxicosis, sepsis, polypharmacy, psychiatric disturbance, seizure, ischemic stroke, hemorrhagic stroke.     Triage Note: Multiple falls since Discharge from here.  Initially fell on 7th with resulting head bleed and left shoulder injury. Family states patient a bit more confused than baseline. In memory care with dementia and parkinson's. Presents with mild confusion and left shoulder pain.            ED Course as of 04/11/25 1450   Fri Apr 11, 2025   1114 Patient presents with multiple falls discharged home with worsening confusion and prior head bleed.  He is not anticoagulated but does have a history of Parkinson's.  He feels dizzy and like he has passed out with the falls.  On exam no new head trauma and has evidence of old left-sided head trauma.  Patient is confused to events and is unable to dress himself which is abnormal for the patient per patient's daughter.  He is intermittently following commands however no focal neurologic deficits.  His left eye does appear mildly drooped and he has 3 mm of pupils that are equal round reactive to light.   Extraocular motions are intact.  Will obtain broad laboratory workup as well as repeat head CT and neck CT.  Will not reimage to the shoulder as I have low suspicion for fracture or dislocation.   1220 Troponin T, High Sensitivity(!)  Will obtain 2-hour repeat.   1220 Nt probnp inpatient (BNP)  Mildly elevated.   1220 CT Head w/o Contrast  IMPRESSION:    1.  Again demonstrated is a subdural hematoma along the left cerebral convexity measuring 6 mm in maximum dimension, unchanged from April 7, 2025.  2.  No other evidence of acute cranial hemorrhage or mass effect.     1226 And they will do so if discharged today.  They have PT and OT and had first evaluation PT yesterday.  Will attempt to ambulate the patient with a walker to assess for stability.  Daughter would feel comfortable discharging home given CT is negative.  Still waiting on repeat troponin.  Also cancel urinalysis given negative urinalysis yesterday.   1250 Able to ambulate at baseline.   1357 Troponin T, High Sensitivity(!)  Downtrending, doubt ACS.   1403 Updated daughter at bedside.  Offered admission however feels comfortable taking back to memory care which I believe is reasonable given he is now at his mental baseline and is not confused and his altered mental status has resolved and he is able to ambulate at baseline.        Adult Minor Head Trauma:Age 65 years or older    I discussed the plan for discharge with the patient and patient is agreeable. We discussed supportive cares at home and reasons to return to the ER including new or worsening symptoms. All questions and concerns addressed to the best of my ability. Strict return precautions discussed. Patient to be discharge by RN.    At the conclusion of the encounter I discussed the results of the tests and the disposition. The questions were answered. The patient or family acknowledged understanding and was agreeable with the care plan.     MEDICATIONS GIVEN IN THE EMERGENCY:  Medications -  No data to display    NEW PRESCRIPTIONS STARTED AT TODAY'S ER VISIT  Discharge Medication List as of 4/11/2025  2:06 PM        Discharge Medication List as of 4/11/2025  2:06 PM          =================================================================    HPI    Patient with a history of dementia, Parkinson's, recent subdural hematoma after a fall on 4/7.  He had a CT scan that showed a subdural hematoma without midline shift and 8 mm of thickness and had stable repeat CT and was discharged from [facility] back to his memory care unit.  Since this time he has had more dizziness unsteadiness and multiple falls.  He now is more confused and unable to dress himself.  Denies chest pain or shortness of breath or weakness.  On my exam patient has a GCS of 15 with mild confusion.  Daughter at bedside  states is not at his mental baseline.  He denies vision loss, weakness or numbness.  Still has pain in his left shoulder which a previous been x-rayed which was negative.  He has been receiving his carbidopa levodopa.    PHYSICAL EXAM    /63   Pulse 61   Temp 98.8  F (37.1  C) (Oral)   Resp 27   SpO2 98%   Constitutional: Chronically ill-appearing, scattered abrasions over her left had  Head: Normocephalic, scattered abrasions over left side of head, mucous membranes moist, nose normal.   Neck: Supple, gross ROM intact.   Eyes: Pupils mid-range, sclera white.  Respiratory: Clear to auscultation bilaterally, no respiratory distress, no wheezing, speaks full sentences easily.  Cardiovascular: Normal heart rate, regular rhythm, no murmurs. No lower extremity edema.   GI: Soft, no tenderness to deep palpation in all quadrants.  Musculoskeletal: Moving all 4 extremities intentionally and without pain. No obvious deformity.  Skin: Warm, dry, no rash.  Neurologic: Alert and oriented though to confuse of the events, CN II-XII grossly intact, speech clear. 5/5 strength in upper and lower extremities. Sensation to light touch  intact in all 4 extremities. No pronator drift. No dysmetria with finger to nose. Gait without ataxia.  Psychiatric: Affect normal, cooperative.       LAB:  All pertinent labs reviewed and interpreted.  Results for orders placed or performed during the hospital encounter of 04/11/25   CT Head w/o Contrast    Impression    IMPRESSION:    1.  Again demonstrated is a subdural hematoma along the left cerebral convexity measuring 6 mm in maximum dimension, unchanged from April 7, 2025.  2.  No other evidence of acute cranial hemorrhage or mass effect.   CT Cervical Spine w/o Contrast    Impression    IMPRESSION:  1.  No evidence of acute fracture or subluxation of the cervical spine by CT imaging.  2.  Degenerative cervical spondylosis as described above.   Basic metabolic panel   Result Value Ref Range    Sodium 140 135 - 145 mmol/L    Potassium 4.7 3.4 - 5.3 mmol/L    Chloride 105 98 - 107 mmol/L    Carbon Dioxide (CO2) 27 22 - 29 mmol/L    Anion Gap 8 7 - 15 mmol/L    Urea Nitrogen 18.6 8.0 - 23.0 mg/dL    Creatinine 1.21 (H) 0.67 - 1.17 mg/dL    GFR Estimate 59 (L) >60 mL/min/1.73m2    Calcium 8.9 8.8 - 10.4 mg/dL    Glucose 101 (H) 70 - 99 mg/dL   Result Value Ref Range    Troponin T, High Sensitivity 33 (H) <=22 ng/L   Nt probnp inpatient (BNP)   Result Value Ref Range    N terminal Pro BNP Inpatient 788 0 - 1,800 pg/mL   Result Value Ref Range    Magnesium 2.2 1.7 - 2.3 mg/dL   CBC with platelets and differential   Result Value Ref Range    WBC Count 9.3 4.0 - 11.0 10e3/uL    RBC Count 3.82 (L) 4.40 - 5.90 10e6/uL    Hemoglobin 12.3 (L) 13.3 - 17.7 g/dL    Hematocrit 36.9 (L) 40.0 - 53.0 %    MCV 97 78 - 100 fL    MCH 32.2 26.5 - 33.0 pg    MCHC 33.3 31.5 - 36.5 g/dL    RDW 12.9 10.0 - 15.0 %    Platelet Count 153 150 - 450 10e3/uL    % Neutrophils 85 %    % Lymphocytes 8 %    % Monocytes 7 %    % Eosinophils 0 %    % Basophils 0 %    % Immature Granulocytes 0 %    NRBCs per 100 WBC 0 <1 /100    Absolute  Neutrophils 7.9 1.6 - 8.3 10e3/uL    Absolute Lymphocytes 0.7 (L) 0.8 - 5.3 10e3/uL    Absolute Monocytes 0.6 0.0 - 1.3 10e3/uL    Absolute Eosinophils 0.0 0.0 - 0.7 10e3/uL    Absolute Basophils 0.0 0.0 - 0.2 10e3/uL    Absolute Immature Granulocytes 0.0 <=0.4 10e3/uL    Absolute NRBCs 0.0 10e3/uL   Result Value Ref Range    Troponin T, High Sensitivity 31 (H) <=22 ng/L   ECG 12-LEAD WITH MUSE (LHE)   Result Value Ref Range    Systolic Blood Pressure 108 mmHg    Diastolic Blood Pressure 59 mmHg    Ventricular Rate 72 BPM    Atrial Rate 72 BPM    MN Interval 184 ms    QRS Duration 126 ms     ms    QTc 440 ms    P Axis 43 degrees    R AXIS -63 degrees    T Axis 12 degrees    Interpretation ECG       Sinus rhythm  Left axis deviation  Non-specific intra-ventricular conduction block  Abnormal ECG  When compared with ECG of 06-Apr-2025 23:14,  No significant change was found  Confirmed by SEE ED PROVIDER NOTE FOR, ECG INTERPRETATION ([id number]),  Brady Guy ([id number]) on 4/11/2025 11:18:08 AM         RADIOLOGY:  Reviewed all pertinent imaging. Please see official radiology report.  CT Cervical Spine w/o Contrast   Final Result   IMPRESSION:   1.  No evidence of acute fracture or subluxation of the cervical spine by CT imaging.   2.  Degenerative cervical spondylosis as described above.      CT Head w/o Contrast   Final Result   IMPRESSION:     1.  Again demonstrated is a subdural hematoma along the left cerebral convexity measuring 6 mm in maximum dimension, unchanged from April 7, 2025.   2.  No other evidence of acute cranial hemorrhage or mass effect.          EKG:    Interpretation: Interpreted by me contemporaneously  Rate: 72  Rhythm is normal sinus rhythm  MN interval normal  QRS interval normal  QT interval normal  ST-T changes: Nonspecific ST changes  Impression: Sinus rhythm, left axis deviation, intraventricular conduction block, nonspecific ST changes, abnormal EKG      I have independently reviewed  and interpreted the EKG(s) documented above.        Ariel Garcia MD  [facility] EMERGENCY ROOM  [address] [id number] [phone]   =================================================================    BILLING:  Data  Category 1  Non-ED record review, if applicable. External record reviewed: Reviewed past admission to the hospital where patient was admitted for subdural hematoma on 4/7     Clinical information was obtained from an independent historian. History was obtained from: Patient and Daughter provided additional information in the HPI     The following testing was considered but ultimately not selected after discussion with patient/family: N/A     Category 2  My independent interpretation of EKG, rhythm strip, radiology study: Head CT did not reveal large intracranial hemorrhage     Category 3  Discussion of management with other physician/healthcare provider/other source: N/A       Risk  Prescription medication was considered, but ultimately not given after discussion with patient/family: N/A     Chronic conditions affecting care:  Subdural hematoma, Alzheimer's, Parkinson's disease     Consideration of Admission/Observation: Escalation of care including admission/observation was considered given the complexity and risk of the patient's presenting complaint, exam findings, and/or their underlying comorbidities. However, ultimately I feel the patient is safe for outpatient management with close follow up. Reasoning: Work-up reassuring, does not reveal any acute life/organ threatening processes, patient's symptoms well controlled upon reevaluation, reexamination is reassuring, vitals are stable, patient agreeable with discharge, reliable for follow-up.       Considered admission however patient is at his mental baseline and was able to ambulate without becoming a fall risk.  He is already in memory care unit and has ability to escalate care as well as PT  OT evaluation.  Also considered MRI and admission however shared decision made with patient and his daughter they prefer to be discharged home to his memory care which I believe is reasonable.       Ariel Garcia MD  04/11/25 [id number]

## 2025-04-11 NOTE — ED NOTES
Bed: [facility]-13  Expected date:   Expected time:   Means of arrival:   Comments:  EMS WB, 5min 84M Fall 2 days go

## 2025-04-11 NOTE — ED TRIAGE NOTES
Multiple falls since Discharge from here.  Initially fell on 7th with resulting head bleed and left shoulder injury. Family states patient a bit more confused than baseline. In memory care with dementia and parkinson's. Presents with mild confusion and left shoulder pain.

## 2025-04-11 NOTE — PHARMACY-ADMISSION MEDICATION HISTORY
Pharmacist Admission Medication History    Admission medication history is complete. The information provided in this note is only as accurate as the sources available at the time of the update.    Information Source(s): Facility (U/NH/) medication list/MAR and CareEverywhere/SureScripts via N/A    Pertinent Information: None    Changes made to PTA medication list:  Added: citalopram  Deleted: MVI , Vitamin E  Changed: None    Allergies reviewed with patient and updates made in EHR:  per NH list    Medication History Completed By: Susan Mathis RPH 4/11/2025 11:38 AM    PTA Med List   Medication Sig Last Dose/Taking    acetaminophen (TYLENOL) 325 MG tablet Take 650 mg by mouth every 6 hours as needed Unknown    atenolol (TENORMIN) 25 MG tablet Take 1.5 tablets (37.5 mg) by mouth daily 4/11/2025 Morning    carbidopa-levodopa (SINEMET)  MG tablet Take 2 tablets by mouth 3 times daily 7 am, 1 pm, and 7pm. 4/11/2025 Morning    citalopram (CELEXA) 10 MG tablet Take 10 mg by mouth daily. 4/11/2025 Morning    donepezil (ARICEPT) 5 MG tablet Take 5 mg by mouth daily 4/11/2025 Morning    finasteride (PROSCAR) 5 MG tablet Take 1 tablet by mouth every morning 4/11/2025 Morning    NYAMYC 277204 UNIT/GM external powder Apply topically 2 times daily as needed Unknown    tamsulosin (FLOMAX) 0.4 MG capsule Take 0.4 mg by mouth every evening 4/10/2025 Evening    traZODone (DESYREL) 50 MG tablet Take 50 mg by mouth at bedtime 4/10/2025 Bedtime    VITAMIN D (CHOLECALCIFEROL) PO Take 2,000 Units by mouth daily 4/11/2025 Morning

## 2025-04-24 ENCOUNTER — LAB REQUISITION (OUTPATIENT)
Dept: LAB | Facility: CLINIC | Age: 85
End: 2025-04-24
Payer: COMMERCIAL

## 2025-04-24 DIAGNOSIS — R10.0 ACUTE ABDOMEN: ICD-10-CM

## 2025-04-24 LAB — C DIFF TOX B STL QL: NEGATIVE

## 2025-04-24 PROCEDURE — 87493 C DIFF AMPLIFIED PROBE: CPT | Mod: ORL | Performed by: NURSE PRACTITIONER

## 2025-04-29 ENCOUNTER — LAB REQUISITION (OUTPATIENT)
Dept: LAB | Facility: CLINIC | Age: 85
End: 2025-04-29
Payer: COMMERCIAL

## 2025-04-29 DIAGNOSIS — I10 ESSENTIAL (PRIMARY) HYPERTENSION: ICD-10-CM

## 2025-04-29 DIAGNOSIS — D64.9 ANEMIA, UNSPECIFIED: ICD-10-CM

## 2025-04-30 ENCOUNTER — LAB REQUISITION (OUTPATIENT)
Dept: LAB | Facility: CLINIC | Age: 85
End: 2025-04-30
Payer: COMMERCIAL

## 2025-04-30 LAB
ALBUMIN UR-MCNC: NEGATIVE MG/DL
ANION GAP SERPL CALCULATED.3IONS-SCNC: 9 MMOL/L (ref 7–15)
APPEARANCE UR: CLEAR
BASOPHILS # BLD AUTO: 0 10E3/UL (ref 0–0.2)
BASOPHILS NFR BLD AUTO: 1 %
BILIRUB UR QL STRIP: NEGATIVE
BUN SERPL-MCNC: 13.4 MG/DL (ref 8–23)
CALCIUM SERPL-MCNC: 8.8 MG/DL (ref 8.8–10.4)
CHLORIDE SERPL-SCNC: 102 MMOL/L (ref 98–107)
COLOR UR AUTO: YELLOW
CREAT SERPL-MCNC: 1 MG/DL (ref 0.67–1.17)
EGFRCR SERPLBLD CKD-EPI 2021: 74 ML/MIN/1.73M2
EOSINOPHIL # BLD AUTO: 0.1 10E3/UL (ref 0–0.7)
EOSINOPHIL NFR BLD AUTO: 2 %
ERYTHROCYTE [DISTWIDTH] IN BLOOD BY AUTOMATED COUNT: 12.9 % (ref 10–15)
GLUCOSE SERPL-MCNC: 92 MG/DL (ref 70–99)
GLUCOSE UR STRIP-MCNC: NEGATIVE MG/DL
HCO3 SERPL-SCNC: 26 MMOL/L (ref 22–29)
HCT VFR BLD AUTO: 39.5 % (ref 40–53)
HGB BLD-MCNC: 12.5 G/DL (ref 13.3–17.7)
HGB UR QL STRIP: NEGATIVE
HYALINE CASTS: 7 /LPF
IMM GRANULOCYTES # BLD: 0 10E3/UL
IMM GRANULOCYTES NFR BLD: 0 %
KETONES UR STRIP-MCNC: ABNORMAL MG/DL
LEUKOCYTE ESTERASE UR QL STRIP: NEGATIVE
LYMPHOCYTES # BLD AUTO: 1.4 10E3/UL (ref 0.8–5.3)
LYMPHOCYTES NFR BLD AUTO: 24 %
MCH RBC QN AUTO: 30.8 PG (ref 26.5–33)
MCHC RBC AUTO-ENTMCNC: 31.6 G/DL (ref 31.5–36.5)
MCV RBC AUTO: 97 FL (ref 78–100)
MONOCYTES # BLD AUTO: 0.5 10E3/UL (ref 0–1.3)
MONOCYTES NFR BLD AUTO: 9 %
MUCOUS THREADS #/AREA URNS LPF: PRESENT /LPF
NEUTROPHILS # BLD AUTO: 3.8 10E3/UL (ref 1.6–8.3)
NEUTROPHILS NFR BLD AUTO: 64 %
NITRATE UR QL: NEGATIVE
NRBC # BLD AUTO: 0 10E3/UL
NRBC BLD AUTO-RTO: 0 /100
PH UR STRIP: 5.5 [PH] (ref 5–7)
PLATELET # BLD AUTO: 245 10E3/UL (ref 150–450)
POTASSIUM SERPL-SCNC: 4.6 MMOL/L (ref 3.4–5.3)
RBC # BLD AUTO: 4.06 10E6/UL (ref 4.4–5.9)
RBC URINE: 2 /HPF
SODIUM SERPL-SCNC: 137 MMOL/L (ref 135–145)
SP GR UR STRIP: 1.02 (ref 1–1.03)
UROBILINOGEN UR STRIP-MCNC: NORMAL MG/DL
WBC # BLD AUTO: 5.9 10E3/UL (ref 4–11)
WBC URINE: 1 /HPF

## 2025-04-30 PROCEDURE — 85025 COMPLETE CBC W/AUTO DIFF WBC: CPT | Mod: ORL | Performed by: NURSE PRACTITIONER

## 2025-04-30 PROCEDURE — 36415 COLL VENOUS BLD VENIPUNCTURE: CPT | Mod: ORL | Performed by: NURSE PRACTITIONER

## 2025-04-30 PROCEDURE — P9604 ONE-WAY ALLOW PRORATED TRIP: HCPCS | Mod: ORL | Performed by: NURSE PRACTITIONER

## 2025-04-30 PROCEDURE — 80048 BASIC METABOLIC PNL TOTAL CA: CPT | Mod: ORL | Performed by: NURSE PRACTITIONER

## 2025-04-30 PROCEDURE — 81001 URINALYSIS AUTO W/SCOPE: CPT | Mod: ORL | Performed by: NURSE PRACTITIONER

## 2025-06-15 ENCOUNTER — HEALTH MAINTENANCE LETTER (OUTPATIENT)
Age: 85
End: 2025-06-15

## 2025-07-25 ENCOUNTER — APPOINTMENT (OUTPATIENT)
Dept: CT IMAGING | Facility: CLINIC | Age: 85
End: 2025-07-25
Payer: COMMERCIAL

## 2025-07-25 ENCOUNTER — HOSPITAL ENCOUNTER (EMERGENCY)
Facility: CLINIC | Age: 85
Discharge: NURSING, REHAB OR LONG-TERM CARE | End: 2025-07-25
Payer: COMMERCIAL

## 2025-07-25 VITALS
HEIGHT: 69 IN | SYSTOLIC BLOOD PRESSURE: 138 MMHG | BODY MASS INDEX: 28.14 KG/M2 | WEIGHT: 190 LBS | RESPIRATION RATE: 24 BRPM | HEART RATE: 86 BPM | OXYGEN SATURATION: 96 % | DIASTOLIC BLOOD PRESSURE: 75 MMHG | TEMPERATURE: 98.8 F

## 2025-07-25 DIAGNOSIS — S32.009A CLOSED FRACTURE OF TRANSVERSE PROCESS OF LUMBAR VERTEBRA, INITIAL ENCOUNTER (H): ICD-10-CM
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
## 2025-07-25 LAB
ALBUMIN UR-MCNC: 10 MG/DL
ANION GAP SERPL CALCULATED.3IONS-SCNC: 10 MMOL/L (ref 7–15)
APPEARANCE UR: CLEAR
ATRIAL RATE - MUSE: 46 BPM
ATRIAL RATE - MUSE: 56 BPM
BASOPHILS # BLD AUTO: 0 10E3/UL (ref 0–0.2)
BASOPHILS NFR BLD AUTO: 0 %
BILIRUB UR QL STRIP: NEGATIVE
BUN SERPL-MCNC: 24 MG/DL (ref 8–23)
CALCIUM SERPL-MCNC: 9 MG/DL (ref 8.8–10.4)
CHLORIDE SERPL-SCNC: 103 MMOL/L (ref 98–107)
CK SERPL-CCNC: 144 U/L (ref 39–308)
COLOR UR AUTO: ABNORMAL
CREAT SERPL-MCNC: 1.23 MG/DL (ref 0.67–1.17)
DIASTOLIC BLOOD PRESSURE - MUSE: 59 MMHG
DIASTOLIC BLOOD PRESSURE - MUSE: 71 MMHG
EGFRCR SERPLBLD CKD-EPI 2021: 58 ML/MIN/1.73M2
EOSINOPHIL # BLD AUTO: 0 10E3/UL (ref 0–0.7)
EOSINOPHIL NFR BLD AUTO: 0 %
ERYTHROCYTE [DISTWIDTH] IN BLOOD BY AUTOMATED COUNT: 13.3 % (ref 10–15)
GLUCOSE SERPL-MCNC: 101 MG/DL (ref 70–99)
GLUCOSE UR STRIP-MCNC: NEGATIVE MG/DL
HCO3 SERPL-SCNC: 26 MMOL/L (ref 22–29)
HCT VFR BLD AUTO: 39.4 % (ref 40–53)
HGB BLD-MCNC: 13.1 G/DL (ref 13.3–17.7)
HGB UR QL STRIP: ABNORMAL
HOLD SPECIMEN: NORMAL
IMM GRANULOCYTES # BLD: 0 10E3/UL
IMM GRANULOCYTES NFR BLD: 0 %
INTERPRETATION ECG - MUSE: NORMAL
INTERPRETATION ECG - MUSE: NORMAL
KETONES UR STRIP-MCNC: ABNORMAL MG/DL
LEUKOCYTE ESTERASE UR QL STRIP: NEGATIVE
LYMPHOCYTES # BLD AUTO: 0.8 10E3/UL (ref 0.8–5.3)
LYMPHOCYTES NFR BLD AUTO: 11 %
MCH RBC QN AUTO: 32.3 PG (ref 26.5–33)
MCHC RBC AUTO-ENTMCNC: 33.2 G/DL (ref 31.5–36.5)
MCV RBC AUTO: 97 FL (ref 78–100)
MONOCYTES # BLD AUTO: 0.6 10E3/UL (ref 0–1.3)
MONOCYTES NFR BLD AUTO: 8 %
MUCOUS THREADS #/AREA URNS LPF: PRESENT /LPF
NEUTROPHILS # BLD AUTO: 6.2 10E3/UL (ref 1.6–8.3)
NEUTROPHILS NFR BLD AUTO: 80 %
NITRATE UR QL: NEGATIVE
NRBC # BLD AUTO: 0 10E3/UL
NRBC BLD AUTO-RTO: 0 /100
P AXIS - MUSE: 13 DEGREES
P AXIS - MUSE: 50 DEGREES
PH UR STRIP: 6 [PH] (ref 5–7)
PLATELET # BLD AUTO: 149 10E3/UL (ref 150–450)
POTASSIUM SERPL-SCNC: 4.5 MMOL/L (ref 3.4–5.3)
PR INTERVAL - MUSE: 156 MS
PR INTERVAL - MUSE: 174 MS
QRS DURATION - MUSE: 120 MS
QRS DURATION - MUSE: 122 MS
QT - MUSE: 422 MS
QT - MUSE: 462 MS
QTC - MUSE: 404 MS
QTC - MUSE: 407 MS
R AXIS - MUSE: -59 DEGREES
R AXIS - MUSE: -61 DEGREES
RBC # BLD AUTO: 4.05 10E6/UL (ref 4.4–5.9)
RBC URINE: 100 /HPF
SODIUM SERPL-SCNC: 139 MMOL/L (ref 135–145)
SP GR UR STRIP: 1.02 (ref 1–1.03)
SQUAMOUS EPITHELIAL: <1 /HPF
SYSTOLIC BLOOD PRESSURE - MUSE: 119 MMHG
SYSTOLIC BLOOD PRESSURE - MUSE: 146 MMHG
T AXIS - MUSE: -13 DEGREES
T AXIS - MUSE: 11 DEGREES
TROPONIN T SERPL HS-MCNC: 45 NG/L
TROPONIN T SERPL HS-MCNC: 50 NG/L
UROBILINOGEN UR STRIP-MCNC: NORMAL MG/DL
VENTRICULAR RATE- MUSE: 46 BPM
VENTRICULAR RATE- MUSE: 56 BPM
WBC # BLD AUTO: 7.7 10E3/UL (ref 4–11)
WBC URINE: 159 /HPF

## 2025-07-25 PROCEDURE — 87086 URINE CULTURE/COLONY COUNT: CPT

## 2025-07-25 PROCEDURE — 82310 ASSAY OF CALCIUM: CPT

## 2025-07-25 PROCEDURE — 72125 CT NECK SPINE W/O DYE: CPT

## 2025-07-25 PROCEDURE — 84484 ASSAY OF TROPONIN QUANT: CPT

## 2025-07-25 PROCEDURE — 81001 URINALYSIS AUTO W/SCOPE: CPT

## 2025-07-25 PROCEDURE — 250N000011 HC RX IP 250 OP 636

## 2025-07-25 PROCEDURE — 258N000003 HC RX IP 258 OP 636

## 2025-07-25 PROCEDURE — 96360 HYDRATION IV INFUSION INIT: CPT | Mod: 59

## 2025-07-25 PROCEDURE — 82550 ASSAY OF CK (CPK): CPT

## 2025-07-25 PROCEDURE — 999N000104 CT LUMBAR SPINE RECONSTRUCTED

## 2025-07-25 PROCEDURE — 71260 CT THORAX DX C+: CPT

## 2025-07-25 PROCEDURE — 36415 COLL VENOUS BLD VENIPUNCTURE: CPT

## 2025-07-25 PROCEDURE — 85014 HEMATOCRIT: CPT

## 2025-07-25 PROCEDURE — 250N000013 HC RX MED GY IP 250 OP 250 PS 637

## 2025-07-25 PROCEDURE — 70450 CT HEAD/BRAIN W/O DYE: CPT

## 2025-07-25 PROCEDURE — 999N000104 CT THORACIC SPINE RECONSTRUCTED

## 2025-07-25 PROCEDURE — 93005 ELECTROCARDIOGRAM TRACING: CPT

## 2025-07-25 PROCEDURE — 93005 ELECTROCARDIOGRAM TRACING: CPT | Performed by: EMERGENCY MEDICINE

## 2025-07-25 PROCEDURE — 99285 EMERGENCY DEPT VISIT HI MDM: CPT | Mod: 25

## 2025-07-25 RX ORDER — DONEPEZIL HYDROCHLORIDE 5 MG/1
5 TABLET, FILM COATED ORAL AT BEDTIME
Status: DISCONTINUED | OUTPATIENT
Start: 2025-07-25 | End: 2025-07-25

## 2025-07-25 RX ORDER — IOPAMIDOL 755 MG/ML
90 INJECTION, SOLUTION INTRAVASCULAR ONCE
Status: COMPLETED | OUTPATIENT
Start: 2025-07-25 | End: 2025-07-25

## 2025-07-25 RX ORDER — CARBIDOPA AND LEVODOPA 25; 100 MG/1; MG/1
2 TABLET ORAL ONCE
Status: COMPLETED | OUTPATIENT
Start: 2025-07-25 | End: 2025-07-25

## 2025-07-25 RX ORDER — DONEPEZIL HYDROCHLORIDE 5 MG/1
5 TABLET, FILM COATED ORAL ONCE
Status: COMPLETED | OUTPATIENT
Start: 2025-07-25 | End: 2025-07-25

## 2025-07-25 RX ORDER — OXYCODONE HYDROCHLORIDE 5 MG/1
5 TABLET ORAL EVERY 8 HOURS PRN
Qty: 12 TABLET | Refills: 0 | Status: SHIPPED | OUTPATIENT
Start: 2025-07-25

## 2025-07-25 RX ADMIN — IOPAMIDOL 90 ML: 755 INJECTION, SOLUTION INTRAVENOUS at 09:39

## 2025-07-25 RX ADMIN — CARBIDOPA AND LEVODOPA 2 TABLET: 25; 100 TABLET ORAL at 12:36

## 2025-07-25 RX ADMIN — DONEPEZIL HYDROCHLORIDE 5 MG: 5 TABLET ORAL at 12:38

## 2025-07-25 RX ADMIN — SODIUM CHLORIDE 500 ML: 0.9 INJECTION, SOLUTION INTRAVENOUS at 09:02

## 2025-07-25 ASSESSMENT — ACTIVITIES OF DAILY LIVING (ADL)
ADLS_ACUITY_SCORE: 54

## 2025-07-25 ASSESSMENT — COLUMBIA-SUICIDE SEVERITY RATING SCALE - C-SSRS
2. HAVE YOU ACTUALLY HAD ANY THOUGHTS OF KILLING YOURSELF IN THE PAST MONTH?: NO
6. HAVE YOU EVER DONE ANYTHING, STARTED TO DO ANYTHING, OR PREPARED TO DO ANYTHING TO END YOUR LIFE?: NO
1. IN THE PAST MONTH, HAVE YOU WISHED YOU WERE DEAD OR WISHED YOU COULD GO TO SLEEP AND NOT WAKE UP?: NO

## 2025-07-25 NOTE — PROGRESS NOTES
NSGY pager note    Paged by and discussed with the ED provider.    85-year-old male history of dementia brought to the ED via EMS from assisted living for unwitnessed fall.  Per ED provider, patient primarily noting back pain,  moving all extremities appropriately on exam.  Lumbar CT demonstrating mildly displaced fracture of the right transverse processes of L1 and L2.    No additional imaging/follow-up needed, do not recommend bracing as patient has history of dementia.  Recommend pain control and avoiding falls if able, okay to discharge from neurosurgery point of view once medically stable.    Will review with Dr. Morales.    Kinza Ruiz PA-C  [facility] Neurosurgery  [address] [place] [id number]

## 2025-07-25 NOTE — ED PROVIDER NOTES
EMERGENCY DEPARTMENT ENCOUNTER      NAME: Al Lynn  AGE: 85 year old male  YOB: 1940  MRN: [id number]  EVALUATION DATE & TIME: 7/25/2025  8:02 AM    PCP: Kamran Gutiérrez    ED PROVIDER: Ariel Garcia MD    FINAL IMPRESSION:  1. Fall, initial encounter    2. Closed fracture of transverse process of lumbar vertebra, initial encounter (H)        ED COURSE & MEDICAL DECISION MAKING:    Pertinent Labs & Imaging studies reviewed. (See chart for details)  85 year old male presents to the Emergency Department for evaluation of fall, head injury, and back pain.  Differential diagnosis considered Skull fracture, subdural hematoma, epidural hematoma, ICH, basilar skull fracture, septal hematoma, facial fracture, cervical spine fracture, spinal cord injury, pneumothorax, hemothorax, rib fracture, AAA, intra-abdominal bleeding, perforated viscus, intra-abdominal hematoma, fracture, dislocation, nerve injury, arterial injury, compartment syndrome.     Triage Note: Patient arrives via [facility] from assisted living for unwitnessed fall with head injury. Initially patient reported in triage he was getting dressed, when asked by MD he said he was just walking out the door. Hx of dementia. Initially reporting RLQ abdominal pain to EMS. In triage initally reporting mid back pain, then L leg pain. Hematoma noted to R scalp. Pain noted when back palpated by MD. Disoriented to time. Oriented to place and person. +CMS.      ED Course as of 07/25/25 1211   Fri Jul 25, 2025   0818 Patient with history of dementia Parkinson's presenting with fall.  Unwitnessed fall approximately 7 AM this morning when he was getting dressed.  Has evidence of head trauma with a right forehead hematoma and abrasion.  Moving all extremities though does have cogwheel rigidity consistent with patient's Parkinson's.  Extremities are warm and well-perfused.  No tenderness or pain with movement.  Abdomen soft nontender.  Lungs are clear  to auscultation.  Does have diffuse spinal pain.  Given patient's underlying dementia unreliable story will obtain further workup and evaluation including trauma imaging.  Will also obtain UA CK basic labs.   0904 EKG#2-sinus bradycardia.  Has a left bundle which was previously present.  No ST elevation or depression concerning for acute ischemia.   0924 Troponin T, High Sensitivity(!)  Mildly elevated however chart review has been elevated in the past   0924 CK total  Doubt rhabdomyolysis   0924 CBC with Platelets and Differential (Limited Occurrences)(!)  Unremarkable   0924 Basic Metabolic Panel (Limited Occurrences)(!)  Shows mild BRETT we will give fluids   1011 Head CT w/o contrast  IMPRESSION:  1.  No acute intracranial findings.  2.  Stable generalized cerebral volume loss and presumed chronic microvascular ischemic changes of the white matter.     1011 CT Chest/Abdomen/Pelvis w Contrast  IMPRESSION:  1.  No acute abnormality in the chest, abdomen or pelvis.  2.  Nonobstructing nephrolithiasis bilaterally. No ureteral stones or hydronephrosis.  3.  Diverticulosis without evidence of diverticulitis.  4.  Additional noncritical details in the findings.     1011 CT Lumbar Spine Reconstructed  IMPRESSION:  CERVICAL SPINE CT:  1.  No evidence of acute cervical fracture.     THORACIC SPINE CT:  1.  No evidence of acute thoracic fracture.     LUMBAR SPINE CT:  1.  Mildly displaced fractures of the right transverse processes of L1 and L2.           1032 Neurosurgery- pain management.  No need for follow up   1203 Patient currently lives in assisted living facility level care to.  Daughter at bedside states he can be upgraded to a care level 3 which is more cares.  Does not need to hospitalize for this.  I did offer admission for PT OT evaluation however they state they have it at the facility and daughter feels comfortable taking the patient home.   1208 UA with Microscopic reflex to Culture(!)  UA showed leukocytosis  "with negative nitrite and leukocyte Estrace.  will obtain urine culture.  Given has not had symptoms no worsening confusion will not electively treat.       Adult Minor Head Trauma:Age 65 years or older    I discussed the plan for discharge with the patient and patient is agreeable. We discussed supportive cares at home and reasons to return to the ER including new or worsening symptoms. All questions and concerns addressed to the best of my ability. Strict return precautions discussed. Patient to be discharge by RN.    At the conclusion of the encounter I discussed the results of the tests and the disposition. The questions were answered. The patient or family acknowledged understanding and was agreeable with the care plan.     MEDICATIONS GIVEN IN THE EMERGENCY:  Medications   sodium chloride 0.9% BOLUS 500 mL (0 mLs Intravenous Stopped 7/25/25 1030)   iopamidol (ISOVUE-370) solution 90 mL (90 mLs Intravenous $Given 7/25/25 4983)       NEW PRESCRIPTIONS STARTED AT TODAY'S ER VISIT  New Prescriptions    OXYCODONE (ROXICODONE) 5 MG TABLET    Take 1 tablet (5 mg) by mouth every 8 hours as needed for breakthrough pain.     Modified Medications    No medications on file       =================================================================    HPI    Al Lynn is a 85 year old male with a pertinent history of dementia who presents to this ED for evaluation of fall, head injury, and back pain.    Patient reports to emergency department from an assistant living facility. Patient has dementia and is unable to recall the date or his location at arrival. This morning at 7 am, patient was getting ready when he slipped and fell. He states a head injury on the right side.  Patient denies chest pain, and shortness of breath. Denies neck or back pain. Fall was unwitnessed. Patient Is not on blood thinners.     PHYSICAL EXAM    /68   Pulse 69   Temp 98.8  F (37.1  C) (Oral)   Resp 22   Ht 1.753 m (5' 9\")   Wt " "86.2 kg (190 lb)   SpO2 98%   BMI 28.06 kg/m    /68   Pulse 69   Temp 98.8  F (37.1  C) (Oral)   Resp 22   Ht 1.753 m (5' 9\")   Wt 86.2 kg (190 lb)   SpO2 98%   BMI 28.06 kg/m      General Appearance: Alert, cooperative, normal speech and facial symmetry, appears stated age    Primary survey:     Airway: patent  Breath sounds: bilateral breath sounds  Cardiovascular: 2+ radial pulses and DP pulses  Disability: GCS 14- confused to events    Secondary survey    Head:  Normocephalic, without obvious abnormality  Eyes:  PERRL, pupils midsized, conjunctiva/corneas clear, EOM's intact, no orbital injury  ENT:  No obvious facial deformity.  No tenderness to palpation.  No epistaxis.  Extraocular movements are intact.   Neck:  No midline cervical spine tenderness.  No paraspinal tenderness.  Back:   Diffuse global spinal tenderness  Chest:  No tenderness or deformity, no crepitus  Cardio:  Regular rate and regular rhythm, no loud obvious murmur, <2 sec capillary refill in extremities  Pulm:  Clear to auscultation bilaterally, respirations unlabored   Abdomen:  Soft, non-tender, no rebound or guarding, no pelvic pain to compression  Extremities:  No obvious deformity. No tenderness over joints or extremities, no cyanosis or edema, full function and range of motion, normal cap refill. Rigidity upper extremities. Strong palpitations. Well perfused extremities.   Skin:  Skin color, texture, turgor are normal, no rashes or lesions  Neuro:  Awake, alert, responsive to voice, follows commands, normal speech, no gross motor weakness or sensory loss, moves all extremities spontaneously        LAB:  All pertinent labs reviewed and interpreted.  Results for orders placed or performed during the hospital encounter of 07/25/25   CT Chest/Abdomen/Pelvis w Contrast    Impression    IMPRESSION:  1.  No acute abnormality in the chest, abdomen or pelvis.  2.  Nonobstructing nephrolithiasis bilaterally. No ureteral stones or " hydronephrosis.  3.  Diverticulosis without evidence of diverticulitis.  4.  Additional noncritical details in the findings.   Head CT w/o contrast    Impression    IMPRESSION:  1.  No acute intracranial findings.  2.  Stable generalized cerebral volume loss and presumed chronic microvascular ischemic changes of the white matter.   CT Cervical Spine w/o Contrast    Impression    IMPRESSION:  CERVICAL SPINE CT:  1.  No evidence of acute cervical fracture.    THORACIC SPINE CT:  1.  No evidence of acute thoracic fracture.    LUMBAR SPINE CT:  1.  Mildly displaced fractures of the right transverse processes of L1 and L2.   CT Thoracic Spine Reconstructed    Impression    IMPRESSION:  CERVICAL SPINE CT:  1.  No evidence of acute cervical fracture.    THORACIC SPINE CT:  1.  No evidence of acute thoracic fracture.    LUMBAR SPINE CT:  1.  Mildly displaced fractures of the right transverse processes of L1 and L2.   CT Lumbar Spine Reconstructed    Impression    IMPRESSION:  CERVICAL SPINE CT:  1.  No evidence of acute cervical fracture.    THORACIC SPINE CT:  1.  No evidence of acute thoracic fracture.    LUMBAR SPINE CT:  1.  Mildly displaced fractures of the right transverse processes of L1 and L2.   Basic Metabolic Panel (Limited Occurrences)   Result Value Ref Range    Sodium 139 135 - 145 mmol/L    Potassium 4.5 3.4 - 5.3 mmol/L    Chloride 103 98 - 107 mmol/L    Carbon Dioxide (CO2) 26 22 - 29 mmol/L    Anion Gap 10 7 - 15 mmol/L    Urea Nitrogen 24.0 (H) 8.0 - 23.0 mg/dL    Creatinine 1.23 (H) 0.67 - 1.17 mg/dL    GFR Estimate 58 (L) >60 mL/min/1.73m2    Calcium 9.0 8.8 - 10.4 mg/dL    Glucose 101 (H) 70 - 99 mg/dL   Result Value Ref Range     39 - 308 U/L   UA with Microscopic reflex to Culture    Specimen: Urine, Catheter   Result Value Ref Range    Color Urine Light Orange (A) Colorless, Straw, Light Yellow, Yellow    Appearance Urine Clear Clear    Glucose Urine Negative Negative mg/dL    Bilirubin Urine  Negative Negative    Ketones Urine Trace (A) Negative mg/dL    Specific Gravity Urine 1.025 1.005 - 1.030    Blood Urine >1.0 mg/dL (A) Negative    pH Urine 6.0 5.0 - 7.0    Protein Albumin Urine 10 (A) Negative mg/dL    Urobilinogen Urine Normal Normal mg/dL    Nitrite Urine Negative Negative    Leukocyte Esterase Urine Negative Negative    Mucus Urine Present (A) None Seen /LPF    RBC Urine 100 (H) <=2 /HPF    WBC Urine 159 (H) <=5 /HPF    Squamous Epithelials Urine <1 <=1 /HPF   CBC with platelets and differential   Result Value Ref Range    WBC Count 7.7 4.0 - 11.0 10e3/uL    RBC Count 4.05 (L) 4.40 - 5.90 10e6/uL    Hemoglobin 13.1 (L) 13.3 - 17.7 g/dL    Hematocrit 39.4 (L) 40.0 - 53.0 %    MCV 97 78 - 100 fL    MCH 32.3 26.5 - 33.0 pg    MCHC 33.2 31.5 - 36.5 g/dL    RDW 13.3 10.0 - 15.0 %    Platelet Count 149 (L) 150 - 450 10e3/uL    % Neutrophils 80 %    % Lymphocytes 11 %    % Monocytes 8 %    % Eosinophils 0 %    % Basophils 0 %    % Immature Granulocytes 0 %    NRBCs per 100 WBC 0 <1 /100    Absolute Neutrophils 6.2 1.6 - 8.3 10e3/uL    Absolute Lymphocytes 0.8 0.8 - 5.3 10e3/uL    Absolute Monocytes 0.6 0.0 - 1.3 10e3/uL    Absolute Eosinophils 0.0 0.0 - 0.7 10e3/uL    Absolute Basophils 0.0 0.0 - 0.2 10e3/uL    Absolute Immature Granulocytes 0.0 <=0.4 10e3/uL    Absolute NRBCs 0.0 10e3/uL   Extra Blue Top Tube   Result Value Ref Range    Hold Specimen JIC    Result Value Ref Range    Troponin T, High Sensitivity 50 (H) <=22 ng/L   Result Value Ref Range    Troponin T, High Sensitivity 45 (H) <=22 ng/L   ECG 12-LEAD WITH MUSE ([facility])   Result Value Ref Range    Systolic Blood Pressure 146 mmHg    Diastolic Blood Pressure 71 mmHg    Ventricular Rate 56 BPM    Atrial Rate 56 BPM    VA Interval 156 ms    QRS Duration 120 ms     ms    QTc 407 ms    P Axis 50 degrees    R AXIS -61 degrees    T Axis 11 degrees    Interpretation ECG       Sinus bradycardia  Left axis deviation  Left ventricular  hypertrophy with QRS widening  Abnormal ECG  When compared with ECG of 11-Apr-2025 10:53,  No significant change was found  Confirmed by SEE ED PROVIDER NOTE FOR, ECG INTERPRETATION ([id number]),  Brady Guy ([id number]) on 7/25/2025 8:38:26 AM     ECG 12-LEAD WITH MUSE (LHE)   Result Value Ref Range    Systolic Blood Pressure 119 mmHg    Diastolic Blood Pressure 59 mmHg    Ventricular Rate 46 BPM    Atrial Rate 46 BPM    DC Interval 174 ms    QRS Duration 122 ms     ms    QTc 404 ms    P Axis 13 degrees    R AXIS -59 degrees    T Axis -13 degrees    Interpretation ECG       Sinus bradycardia  Left bundle branch block  Abnormal ECG  When compared with ECG of 25-Jul-2025 08:08,  Left bundle branch block is now Present  Confirmed by SEE ED PROVIDER NOTE FOR, ECG INTERPRETATION ([id number]),  Brady Guy ([id number]) on 7/25/2025 9:50:53 AM         RADIOLOGY:  Reviewed all pertinent imaging. Please see official radiology report.  CT Lumbar Spine Reconstructed   Final Result   IMPRESSION:   CERVICAL SPINE CT:   1.  No evidence of acute cervical fracture.      THORACIC SPINE CT:   1.  No evidence of acute thoracic fracture.      LUMBAR SPINE CT:   1.  Mildly displaced fractures of the right transverse processes of L1 and L2.      CT Thoracic Spine Reconstructed   Final Result   IMPRESSION:   CERVICAL SPINE CT:   1.  No evidence of acute cervical fracture.      THORACIC SPINE CT:   1.  No evidence of acute thoracic fracture.      LUMBAR SPINE CT:   1.  Mildly displaced fractures of the right transverse processes of L1 and L2.      CT Chest/Abdomen/Pelvis w Contrast   Final Result   IMPRESSION:   1.  No acute abnormality in the chest, abdomen or pelvis.   2.  Nonobstructing nephrolithiasis bilaterally. No ureteral stones or hydronephrosis.   3.  Diverticulosis without evidence of diverticulitis.   4.  Additional noncritical details in the findings.      CT Cervical Spine w/o Contrast   Final Result   IMPRESSION:    CERVICAL SPINE CT:   1.  No evidence of acute cervical fracture.      THORACIC SPINE CT:   1.  No evidence of acute thoracic fracture.      LUMBAR SPINE CT:   1.  Mildly displaced fractures of the right transverse processes of L1 and L2.      Head CT w/o contrast   Final Result   IMPRESSION:   1.  No acute intracranial findings.   2.  Stable generalized cerebral volume loss and presumed chronic microvascular ischemic changes of the white matter.          EKG:    Performed at: 08:08    Impression: Left axis deviation. Left ventricular hypertrophy with QRS widening. Abnormal ECG.     Rate: 56 bpm  Rhythm: Sinus bradycardia   KY Interval: 156 ms  QRS Interval: 120 ms  QTc Interval: 422/407 ms  ST Changes: None  Comparison: 11-APR-2025 10:53    Performed at: 9:03    Impression: Left bundle branch block. Abnormal ECG.     Rate: 46 bpm  Rhythm: Sinus bradycardia   KY Interval: 174 ms  QRS Interval: 122 ms  QTc Interval: 462/404 ms  ST Changes: None  Comparison: 25-JUL-2025 08:08    I have independently reviewed and interpreted the EKG(s) documented above.          Ariel Garcia MD  [facility] EMERGENCY ROOM  [facility] [address]  [phone]   =================================================================    BILLING:  Data  Category 1  Non-ED record review, if applicable. External record reviewed: N/A     Clinical information was obtained from an independent historian. History was obtained from: Patient and Daughter provided additional information in the HPI     The following testing was considered but ultimately not selected after discussion with patient/family: N/A     Category 2  My independent interpretation of EKG, rhythm strip, radiology study: Head CT did not reveal large intracranial hemorrhage     Category 3  Discussion of management with other physician/healthcare provider/other source: Spoke to neurosurgeon regarding patient's ED course and agrees to  consult       Risk  Prescription medication was considered, but ultimately not given after discussion with patient/family: N/A     Chronic conditions affecting care: Hypertension     Consideration of Admission/Observation: Escalation of care including admission/observation was considered given the complexity and risk of the patient's presenting complaint, exam findings, and/or their underlying comorbidities. However, ultimately I feel the patient is safe for outpatient management with close follow up. Reasoning: Work-up reassuring, does not reveal any acute life/organ threatening processes, patient's symptoms well controlled upon reevaluation, reexamination is reassuring, vitals are stable, patient agreeable with discharge, reliable for follow-up.   Considered and offered admission given patient's fall unsteadiness however daughter feels comfortable taking the patient back to his care facility and updating his care at his facility.  Do not believe he would benefit from admission for PT OT as he currently has this at his facility      I, Elizabeth Reyes-Coca, am serving as a scribe to document services personally performed by Ariel Garcia MD based on my observation and the provider's statements to me. I, Ariel Garcia MD, attest that Elizabeth Reyes-Coca is acting in a scribe capacity, has observed my performance of the services and has documented them in accordance with my direction.     Ariel Garcia MD  07/25/25 1123

## 2025-07-25 NOTE — ED NOTES
Called [facility] [phone]. Report given to nurse Dali. Confirmed patient did not receive morning parkinson's meds and would be helpful if pt can get before transport arrives.

## 2025-07-25 NOTE — ED TRIAGE NOTES
Patient arrives via [facility] from assisted living for unwitnessed fall with head injury. Initially patient reported in triage he was getting dressed, when asked by MD he said he was just walking out the door. Hx of dementia. Initially reporting RLQ abdominal pain to EMS. In triage initally reporting mid back pain, then L leg pain. Hematoma noted to R scalp. Pain noted when back palpated by MD. Disoriented to time. Oriented to place and person. +CMS.     Triage Assessment (Adult)       Row Name 07/25/25 0806          Triage Assessment    Airway WDL WDL        Respiratory WDL    Respiratory WDL WDL        Skin Circulation/Temperature WDL    Skin Circulation/Temperature WDL X  hematoma to R scalp        Cardiac WDL    Cardiac WDL WDL        Peripheral/Neurovascular WDL    Peripheral Neurovascular WDL WDL        Cognitive/Neuro/Behavioral WDL    Cognitive/Neuro/Behavioral WDL X     Level of Consciousness intermittent confusion     Arousal Level opens eyes spontaneously     Orientation oriented x 4;other (see comments)  location of pain reported changing- told EMS R abdomen, initially told RN mid back, now reporting L leg pain.     Speech clear;spontaneous;logical     Mood/Behavior calm;cooperative        Deonna Coma Scale    Best Eye Response 4-->(E4) spontaneous     Best Motor Response 6-->(M6) obeys commands     Best Verbal Response 5-->(V5) oriented     Wendell Coma Scale Score 15

## 2025-07-25 NOTE — DISCHARGE INSTRUCTIONS
You are seen here for fall.  You did have a lumbar transverse fracture.  Will give pain control.  Offered admission however you declined.      Please represented emergency room if unable to ambulate severe pain vomiting shortness of breath or recurrent falls.

## 2025-07-25 NOTE — ED NOTES
Bed: [facility]-03  Expected date:   Expected time:   Means of arrival:   Comments:  EMS CG 85M, Fall, abd pain, no thinners

## 2025-07-25 NOTE — ED NOTES
Pt unable to sit on side of bed without assistance by RN, requiring assistance to stand with walker. Then pt reported dizziness. Pt able to shuffle with steadying assistance for a few steps but still reporting dizziness. Pt assisted back into bed with assistance of two staff.

## 2025-07-27 LAB — BACTERIA UR CULT: NORMAL

## 2025-08-24 ENCOUNTER — APPOINTMENT (OUTPATIENT)
Dept: CT IMAGING | Facility: CLINIC | Age: 85
End: 2025-08-24
Attending: EMERGENCY MEDICINE
Payer: COMMERCIAL

## 2025-08-24 ENCOUNTER — HOSPITAL ENCOUNTER (INPATIENT)
Facility: HOSPITAL | Age: 85
LOS: 3 days | Discharge: NURSING, REHAB OR LONG-TERM CARE | End: 2025-08-27
Attending: HOSPITALIST | Admitting: HOSPITALIST
Payer: COMMERCIAL

## 2025-08-24 ENCOUNTER — APPOINTMENT (OUTPATIENT)
Dept: RADIOLOGY | Facility: CLINIC | Age: 85
End: 2025-08-24
Attending: EMERGENCY MEDICINE
Payer: COMMERCIAL

## 2025-08-24 ENCOUNTER — HOSPITAL ENCOUNTER (EMERGENCY)
Facility: CLINIC | Age: 85
Discharge: ANOTHER HOSPITAL | End: 2025-08-24
Attending: EMERGENCY MEDICINE | Admitting: EMERGENCY MEDICINE
Payer: COMMERCIAL

## 2025-08-24 VITALS
WEIGHT: 190 LBS | BODY MASS INDEX: 28.06 KG/M2 | RESPIRATION RATE: 26 BRPM | TEMPERATURE: 100.4 F | HEART RATE: 46 BPM | OXYGEN SATURATION: 95 % | SYSTOLIC BLOOD PRESSURE: 100 MMHG | DIASTOLIC BLOOD PRESSURE: 51 MMHG

## 2025-08-24 DIAGNOSIS — G20.C PRIMARY PARKINSONISM (H): ICD-10-CM

## 2025-08-24 DIAGNOSIS — F03.C0 SEVERE DEMENTIA WITHOUT BEHAVIORAL DISTURBANCE, PSYCHOTIC DISTURBANCE, MOOD DISTURBANCE, OR ANXIETY, UNSPECIFIED DEMENTIA TYPE (H): ICD-10-CM

## 2025-08-24 DIAGNOSIS — N39.0 SEPSIS SECONDARY TO UTI (H): Primary | ICD-10-CM

## 2025-08-24 DIAGNOSIS — Z51.5 HOSPICE CARE PATIENT: ICD-10-CM

## 2025-08-24 DIAGNOSIS — I47.10 SVT (SUPRAVENTRICULAR TACHYCARDIA): ICD-10-CM

## 2025-08-24 DIAGNOSIS — N30.00 ACUTE CYSTITIS WITHOUT HEMATURIA: ICD-10-CM

## 2025-08-24 DIAGNOSIS — R32 INCONTINENCE ASSOCIATED DERMATITIS: Primary | ICD-10-CM

## 2025-08-24 DIAGNOSIS — L25.8 INCONTINENCE ASSOCIATED DERMATITIS: Primary | ICD-10-CM

## 2025-08-24 DIAGNOSIS — A41.9 SEPSIS SECONDARY TO UTI (H): Primary | ICD-10-CM

## 2025-08-24 DIAGNOSIS — I47.10 PAROXYSMAL SUPRAVENTRICULAR TACHYCARDIA: ICD-10-CM

## 2025-08-24 LAB
ALBUMIN SERPL BCG-MCNC: 3.8 G/DL (ref 3.5–5.2)
ALBUMIN UR-MCNC: 20 MG/DL
ALP SERPL-CCNC: 64 U/L (ref 40–150)
ALT SERPL W P-5'-P-CCNC: 6 U/L (ref 0–70)
AMORPH CRY #/AREA URNS HPF: ABNORMAL /HPF
ANION GAP SERPL CALCULATED.3IONS-SCNC: 8 MMOL/L (ref 7–15)
APPEARANCE UR: ABNORMAL
AST SERPL W P-5'-P-CCNC: 15 U/L (ref 0–45)
ATRIAL RATE - MUSE: 441 BPM
ATRIAL RATE - MUSE: 56 BPM
BACTERIA #/AREA URNS HPF: ABNORMAL /HPF
BASOPHILS # BLD AUTO: <0.03 10E3/UL (ref 0–0.2)
BASOPHILS NFR BLD AUTO: 0.2 %
BILIRUB SERPL-MCNC: 0.7 MG/DL
BILIRUB UR QL STRIP: NEGATIVE
BUN SERPL-MCNC: 19.6 MG/DL (ref 8–23)
CALCIUM SERPL-MCNC: 8.6 MG/DL (ref 8.8–10.4)
CHLORIDE SERPL-SCNC: 105 MMOL/L (ref 98–107)
COLOR UR AUTO: YELLOW
CREAT SERPL-MCNC: 1.04 MG/DL (ref 0.67–1.17)
DIASTOLIC BLOOD PRESSURE - MUSE: 80 MMHG
DIASTOLIC BLOOD PRESSURE - MUSE: NORMAL MMHG
EGFRCR SERPLBLD CKD-EPI 2021: 70 ML/MIN/1.73M2
EOSINOPHIL # BLD AUTO: <0.03 10E3/UL (ref 0–0.7)
EOSINOPHIL NFR BLD AUTO: 0 %
ERYTHROCYTE [DISTWIDTH] IN BLOOD BY AUTOMATED COUNT: 12.4 % (ref 10–15)
FLUAV RNA SPEC QL NAA+PROBE: NEGATIVE
FLUBV RNA RESP QL NAA+PROBE: NEGATIVE
GLUCOSE SERPL-MCNC: 131 MG/DL (ref 70–99)
GLUCOSE UR STRIP-MCNC: NEGATIVE MG/DL
HCO3 SERPL-SCNC: 25 MMOL/L (ref 22–29)
HCT VFR BLD AUTO: 37.4 % (ref 40–53)
HGB BLD-MCNC: 12.5 G/DL (ref 13.3–17.7)
HGB UR QL STRIP: NEGATIVE
IMM GRANULOCYTES # BLD: 0.03 10E3/UL
IMM GRANULOCYTES NFR BLD: 0.3 %
INR PPP: 1.15 (ref 0.85–1.15)
INTERPRETATION ECG - MUSE: NORMAL
INTERPRETATION ECG - MUSE: NORMAL
KETONES UR STRIP-MCNC: 10 MG/DL
LACTATE SERPL-SCNC: 1.1 MMOL/L (ref 0.7–2)
LEUKOCYTE ESTERASE UR QL STRIP: ABNORMAL
LYMPHOCYTES # BLD AUTO: 0.25 10E3/UL (ref 0.8–5.3)
LYMPHOCYTES NFR BLD AUTO: 2.6 %
MAGNESIUM SERPL-MCNC: 1.8 MG/DL (ref 1.7–2.3)
MAGNESIUM SERPL-MCNC: 2 MG/DL (ref 1.7–2.3)
MCH RBC QN AUTO: 32.4 PG (ref 26.5–33)
MCHC RBC AUTO-ENTMCNC: 33.4 G/DL (ref 31.5–36.5)
MCV RBC AUTO: 96.9 FL (ref 78–100)
MONOCYTES # BLD AUTO: 0.51 10E3/UL (ref 0–1.3)
MONOCYTES NFR BLD AUTO: 5.4 %
MUCOUS THREADS #/AREA URNS LPF: PRESENT /LPF
NEUTROPHILS # BLD AUTO: 8.66 10E3/UL (ref 1.6–8.3)
NEUTROPHILS NFR BLD AUTO: 91.5 %
NITRATE UR QL: NEGATIVE
NRBC # BLD AUTO: <0.03 10E3/UL
NRBC BLD AUTO-RTO: 0 /100
P AXIS - MUSE: 25 DEGREES
P AXIS - MUSE: NORMAL DEGREES
PH UR STRIP: 7 [PH] (ref 5–7)
PLATELET # BLD AUTO: 130 10E3/UL (ref 150–450)
POTASSIUM SERPL-SCNC: 4.3 MMOL/L (ref 3.4–5.3)
PR INTERVAL - MUSE: 178 MS
PR INTERVAL - MUSE: NORMAL MS
PROT SERPL-MCNC: 6.4 G/DL (ref 6.4–8.3)
PROTHROMBIN TIME: 14.9 SECONDS (ref 11.8–14.8)
QRS DURATION - MUSE: 114 MS
QRS DURATION - MUSE: 120 MS
QT - MUSE: 336 MS
QT - MUSE: 408 MS
QTC - MUSE: 393 MS
QTC - MUSE: 543 MS
R AXIS - MUSE: -67 DEGREES
R AXIS - MUSE: -84 DEGREES
RBC # BLD AUTO: 3.86 10E6/UL (ref 4.4–5.9)
RBC URINE: 0 /HPF
RSV RNA SPEC NAA+PROBE: NEGATIVE
SARS-COV-2 RNA RESP QL NAA+PROBE: NEGATIVE
SODIUM SERPL-SCNC: 138 MMOL/L (ref 135–145)
SP GR UR STRIP: 1.02 (ref 1–1.03)
SQUAMOUS EPITHELIAL: <1 /HPF
SYSTOLIC BLOOD PRESSURE - MUSE: 134 MMHG
SYSTOLIC BLOOD PRESSURE - MUSE: NORMAL MMHG
T AXIS - MUSE: -14 DEGREES
T AXIS - MUSE: 62 DEGREES
TROPONIN T SERPL HS-MCNC: 28 NG/L
TROPONIN T SERPL HS-MCNC: 28 NG/L
UROBILINOGEN UR STRIP-MCNC: NORMAL MG/DL
VENTRICULAR RATE- MUSE: 157 BPM
VENTRICULAR RATE- MUSE: 56 BPM
WBC # BLD AUTO: 9.47 10E3/UL (ref 4–11)
WBC URINE: 34 /HPF

## 2025-08-24 PROCEDURE — 87088 URINE BACTERIA CULTURE: CPT | Performed by: EMERGENCY MEDICINE

## 2025-08-24 PROCEDURE — 99291 CRITICAL CARE FIRST HOUR: CPT | Mod: 25 | Performed by: EMERGENCY MEDICINE

## 2025-08-24 PROCEDURE — 96361 HYDRATE IV INFUSION ADD-ON: CPT

## 2025-08-24 PROCEDURE — 81001 URINALYSIS AUTO W/SCOPE: CPT | Performed by: EMERGENCY MEDICINE

## 2025-08-24 PROCEDURE — 250N000011 HC RX IP 250 OP 636: Performed by: HOSPITALIST

## 2025-08-24 PROCEDURE — 93005 ELECTROCARDIOGRAM TRACING: CPT | Performed by: EMERGENCY MEDICINE

## 2025-08-24 PROCEDURE — 85004 AUTOMATED DIFF WBC COUNT: CPT | Performed by: EMERGENCY MEDICINE

## 2025-08-24 PROCEDURE — 70450 CT HEAD/BRAIN W/O DYE: CPT

## 2025-08-24 PROCEDURE — 36415 COLL VENOUS BLD VENIPUNCTURE: CPT | Performed by: EMERGENCY MEDICINE

## 2025-08-24 PROCEDURE — 96365 THER/PROPH/DIAG IV INF INIT: CPT | Mod: 59

## 2025-08-24 PROCEDURE — 83735 ASSAY OF MAGNESIUM: CPT | Performed by: EMERGENCY MEDICINE

## 2025-08-24 PROCEDURE — 85610 PROTHROMBIN TIME: CPT | Performed by: EMERGENCY MEDICINE

## 2025-08-24 PROCEDURE — 87637 SARSCOV2&INF A&B&RSV AMP PRB: CPT | Performed by: EMERGENCY MEDICINE

## 2025-08-24 PROCEDURE — 99223 1ST HOSP IP/OBS HIGH 75: CPT | Performed by: HOSPITALIST

## 2025-08-24 PROCEDURE — 87040 BLOOD CULTURE FOR BACTERIA: CPT | Performed by: EMERGENCY MEDICINE

## 2025-08-24 PROCEDURE — 250N000013 HC RX MED GY IP 250 OP 250 PS 637: Performed by: EMERGENCY MEDICINE

## 2025-08-24 PROCEDURE — 250N000011 HC RX IP 250 OP 636: Performed by: EMERGENCY MEDICINE

## 2025-08-24 PROCEDURE — 250N000013 HC RX MED GY IP 250 OP 250 PS 637: Performed by: HOSPITALIST

## 2025-08-24 PROCEDURE — 84484 ASSAY OF TROPONIN QUANT: CPT | Performed by: EMERGENCY MEDICINE

## 2025-08-24 PROCEDURE — 120N000004 HC R&B MS OVERFLOW

## 2025-08-24 PROCEDURE — 74177 CT ABD & PELVIS W/CONTRAST: CPT

## 2025-08-24 PROCEDURE — 71046 X-RAY EXAM CHEST 2 VIEWS: CPT

## 2025-08-24 PROCEDURE — 80053 COMPREHEN METABOLIC PANEL: CPT | Performed by: EMERGENCY MEDICINE

## 2025-08-24 PROCEDURE — 83605 ASSAY OF LACTIC ACID: CPT | Performed by: EMERGENCY MEDICINE

## 2025-08-24 PROCEDURE — 258N000003 HC RX IP 258 OP 636: Performed by: EMERGENCY MEDICINE

## 2025-08-24 RX ORDER — GUAIFENESIN 600 MG/1
1200 TABLET, EXTENDED RELEASE ORAL 2 TIMES DAILY
Status: DISCONTINUED | OUTPATIENT
Start: 2025-08-24 | End: 2025-08-27 | Stop reason: HOSPADM

## 2025-08-24 RX ORDER — ACETAMINOPHEN 325 MG/1
650 TABLET ORAL ONCE
Status: COMPLETED | OUTPATIENT
Start: 2025-08-24 | End: 2025-08-24

## 2025-08-24 RX ORDER — CARBIDOPA AND LEVODOPA 25; 100 MG/1; MG/1
2 TABLET ORAL 3 TIMES DAILY
Status: DISCONTINUED | OUTPATIENT
Start: 2025-08-24 | End: 2025-08-27 | Stop reason: HOSPADM

## 2025-08-24 RX ORDER — PIPERACILLIN SODIUM, TAZOBACTAM SODIUM 4; .5 G/20ML; G/20ML
4.5 INJECTION, POWDER, LYOPHILIZED, FOR SOLUTION INTRAVENOUS ONCE
Status: COMPLETED | OUTPATIENT
Start: 2025-08-24 | End: 2025-08-24

## 2025-08-24 RX ORDER — CITALOPRAM HYDROBROMIDE 10 MG/1
10 TABLET ORAL DAILY
Status: DISCONTINUED | OUTPATIENT
Start: 2025-08-25 | End: 2025-08-27 | Stop reason: HOSPADM

## 2025-08-24 RX ORDER — PIPERACILLIN SODIUM, TAZOBACTAM SODIUM 3; .375 G/15ML; G/15ML
3.38 INJECTION, POWDER, LYOPHILIZED, FOR SOLUTION INTRAVENOUS EVERY 6 HOURS
Status: DISCONTINUED | OUTPATIENT
Start: 2025-08-24 | End: 2025-08-27 | Stop reason: HOSPADM

## 2025-08-24 RX ORDER — DONEPEZIL HYDROCHLORIDE 5 MG/1
5 TABLET, FILM COATED ORAL ONCE
Status: COMPLETED | OUTPATIENT
Start: 2025-08-24 | End: 2025-08-24

## 2025-08-24 RX ORDER — ACETAMINOPHEN 325 MG/1
650 TABLET ORAL EVERY 4 HOURS PRN
Status: DISCONTINUED | OUTPATIENT
Start: 2025-08-24 | End: 2025-08-27 | Stop reason: HOSPADM

## 2025-08-24 RX ORDER — LIDOCAINE 40 MG/G
CREAM TOPICAL
Status: DISCONTINUED | OUTPATIENT
Start: 2025-08-24 | End: 2025-08-27 | Stop reason: HOSPADM

## 2025-08-24 RX ORDER — AMOXICILLIN 250 MG
2 CAPSULE ORAL 2 TIMES DAILY PRN
Status: DISCONTINUED | OUTPATIENT
Start: 2025-08-24 | End: 2025-08-27 | Stop reason: HOSPADM

## 2025-08-24 RX ORDER — DONEPEZIL HYDROCHLORIDE 5 MG/1
5 TABLET, FILM COATED ORAL DAILY
Status: DISCONTINUED | OUTPATIENT
Start: 2025-08-25 | End: 2025-08-27 | Stop reason: HOSPADM

## 2025-08-24 RX ORDER — NYSTATIN 100000 [USP'U]/G
POWDER TOPICAL 2 TIMES DAILY
Status: DISCONTINUED | OUTPATIENT
Start: 2025-08-24 | End: 2025-08-24

## 2025-08-24 RX ORDER — ATENOLOL 25 MG/1
12.5 TABLET ORAL DAILY
Status: ON HOLD | COMMUNITY
End: 2025-08-27

## 2025-08-24 RX ORDER — TAMSULOSIN HYDROCHLORIDE 0.4 MG/1
0.4 CAPSULE ORAL DAILY
Status: DISCONTINUED | OUTPATIENT
Start: 2025-08-25 | End: 2025-08-27 | Stop reason: HOSPADM

## 2025-08-24 RX ORDER — IOPAMIDOL 755 MG/ML
90 INJECTION, SOLUTION INTRAVASCULAR ONCE
Status: COMPLETED | OUTPATIENT
Start: 2025-08-24 | End: 2025-08-24

## 2025-08-24 RX ORDER — GUAIFENESIN 600 MG/1
1200 TABLET, EXTENDED RELEASE ORAL 2 TIMES DAILY PRN
COMMUNITY

## 2025-08-24 RX ORDER — FINASTERIDE 5 MG/1
5 TABLET, FILM COATED ORAL EVERY MORNING
Status: DISCONTINUED | OUTPATIENT
Start: 2025-08-25 | End: 2025-08-27 | Stop reason: HOSPADM

## 2025-08-24 RX ORDER — CARBIDOPA AND LEVODOPA 25; 100 MG/1; MG/1
2 TABLET ORAL ONCE
Status: COMPLETED | OUTPATIENT
Start: 2025-08-24 | End: 2025-08-24

## 2025-08-24 RX ORDER — METOPROLOL TARTRATE 1 MG/ML
5 INJECTION, SOLUTION INTRAVENOUS ONCE
Status: DISCONTINUED | OUTPATIENT
Start: 2025-08-24 | End: 2025-08-24

## 2025-08-24 RX ORDER — AMOXICILLIN 250 MG
1 CAPSULE ORAL 2 TIMES DAILY PRN
Status: DISCONTINUED | OUTPATIENT
Start: 2025-08-24 | End: 2025-08-27 | Stop reason: HOSPADM

## 2025-08-24 RX ORDER — CALCIUM CARBONATE 500 MG/1
1000 TABLET, CHEWABLE ORAL 4 TIMES DAILY PRN
Status: DISCONTINUED | OUTPATIENT
Start: 2025-08-24 | End: 2025-08-27 | Stop reason: HOSPADM

## 2025-08-24 RX ADMIN — CARBIDOPA AND LEVODOPA 2 TABLET: 25; 100 TABLET ORAL at 15:01

## 2025-08-24 RX ADMIN — CARBIDOPA AND LEVODOPA 2 TABLET: 25; 100 TABLET ORAL at 20:26

## 2025-08-24 RX ADMIN — PIPERACILLIN AND TAZOBACTAM 4.5 G: 4; .5 INJECTION, POWDER, FOR SOLUTION INTRAVENOUS at 11:43

## 2025-08-24 RX ADMIN — SODIUM CHLORIDE 1000 ML: 0.9 INJECTION, SOLUTION INTRAVENOUS at 10:47

## 2025-08-24 RX ADMIN — MICONAZOLE NITRATE ANTIFUNGAL POWDER: 2 POWDER TOPICAL at 20:30

## 2025-08-24 RX ADMIN — GUAIFENESIN 1200 MG: 600 TABLET ORAL at 20:25

## 2025-08-24 RX ADMIN — ACETAMINOPHEN 650 MG: 325 TABLET ORAL at 12:13

## 2025-08-24 RX ADMIN — DONEPEZIL HYDROCHLORIDE 5 MG: 5 TABLET ORAL at 11:49

## 2025-08-24 RX ADMIN — PIPERACILLIN AND TAZOBACTAM 3.38 G: 3; .375 INJECTION, POWDER, FOR SOLUTION INTRAVENOUS at 17:27

## 2025-08-24 RX ADMIN — CARBIDOPA AND LEVODOPA 2 TABLET: 25; 100 TABLET ORAL at 11:49

## 2025-08-24 RX ADMIN — IOPAMIDOL 90 ML: 755 INJECTION, SOLUTION INTRAVENOUS at 11:30

## 2025-08-24 ASSESSMENT — ACTIVITIES OF DAILY LIVING (ADL)
CONCENTRATING,_REMEMBERING_OR_MAKING_DECISIONS_DIFFICULTY: YES
DOING_ERRANDS_INDEPENDENTLY_DIFFICULTY: YES
ADLS_ACUITY_SCORE: 55
WEAR_GLASSES_OR_BLIND: NO
EQUIPMENT_CURRENTLY_USED_AT_HOME: WALKER, ROLLING
ADLS_ACUITY_SCORE: 64
CHANGE_IN_FUNCTIONAL_STATUS_SINCE_ONSET_OF_CURRENT_ILLNESS/INJURY: YES
WALKING_OR_CLIMBING_STAIRS_DIFFICULTY: YES
ADLS_ACUITY_SCORE: 63
ADLS_ACUITY_SCORE: 54
ADLS_ACUITY_SCORE: 64
HEARING_DIFFICULTY_OR_DEAF: NO
ADLS_ACUITY_SCORE: 64
DIFFICULTY_COMMUNICATING: NO
DRESSING/BATHING_DIFFICULTY: YES
ADLS_ACUITY_SCORE: 54
DIFFICULTY_EATING/SWALLOWING: NO
ADLS_ACUITY_SCORE: 64
ADLS_ACUITY_SCORE: 64
TOILETING: 1-->ASSISTANCE (EQUIPMENT/PERSON) NEEDED
FALL_HISTORY_WITHIN_LAST_SIX_MONTHS: NO
ADLS_ACUITY_SCORE: 63
WALKING_OR_CLIMBING_STAIRS: AMBULATION DIFFICULTY, REQUIRES EQUIPMENT
DRESSING/BATHING: BATHING DIFFICULTY, REQUIRES EQUIPMENT
TOILETING: 1-->ASSISTANCE (EQUIPMENT/PERSON) NEEDED (NOT DEVELOPMENTALLY APPROPRIATE)
ADLS_ACUITY_SCORE: 54
TOILETING_ISSUES: YES
ADLS_ACUITY_SCORE: 55

## 2025-08-24 ASSESSMENT — COLUMBIA-SUICIDE SEVERITY RATING SCALE - C-SSRS
6. HAVE YOU EVER DONE ANYTHING, STARTED TO DO ANYTHING, OR PREPARED TO DO ANYTHING TO END YOUR LIFE?: NO
1. IN THE PAST MONTH, HAVE YOU WISHED YOU WERE DEAD OR WISHED YOU COULD GO TO SLEEP AND NOT WAKE UP?: NO
2. HAVE YOU ACTUALLY HAD ANY THOUGHTS OF KILLING YOURSELF IN THE PAST MONTH?: NO

## 2025-08-25 ENCOUNTER — APPOINTMENT (OUTPATIENT)
Dept: SPEECH THERAPY | Facility: HOSPITAL | Age: 85
End: 2025-08-25
Attending: HOSPITALIST
Payer: COMMERCIAL

## 2025-08-25 ENCOUNTER — APPOINTMENT (OUTPATIENT)
Dept: PHYSICAL THERAPY | Facility: HOSPITAL | Age: 85
End: 2025-08-25
Attending: HOSPITALIST
Payer: COMMERCIAL

## 2025-08-25 ENCOUNTER — APPOINTMENT (OUTPATIENT)
Dept: OCCUPATIONAL THERAPY | Facility: HOSPITAL | Age: 85
End: 2025-08-25
Attending: HOSPITALIST
Payer: COMMERCIAL

## 2025-08-25 LAB
ANION GAP SERPL CALCULATED.3IONS-SCNC: 9 MMOL/L (ref 7–15)
BACTERIA UR CULT: ABNORMAL
BASOPHILS # BLD AUTO: <0.03 10E3/UL (ref 0–0.2)
BASOPHILS NFR BLD AUTO: 0.3 %
BI-PLANE LVEF ECHO: NORMAL
BUN SERPL-MCNC: 18.3 MG/DL (ref 8–23)
CALCIUM SERPL-MCNC: 8 MG/DL (ref 8.8–10.4)
CHLORIDE SERPL-SCNC: 106 MMOL/L (ref 98–107)
CREAT SERPL-MCNC: 1.06 MG/DL (ref 0.67–1.17)
EGFRCR SERPLBLD CKD-EPI 2021: 69 ML/MIN/1.73M2
EOSINOPHIL # BLD AUTO: <0.03 10E3/UL (ref 0–0.7)
EOSINOPHIL NFR BLD AUTO: 0.2 %
ERYTHROCYTE [DISTWIDTH] IN BLOOD BY AUTOMATED COUNT: 12.6 % (ref 10–15)
GLUCOSE SERPL-MCNC: 102 MG/DL (ref 70–99)
HCO3 SERPL-SCNC: 22 MMOL/L (ref 22–29)
HCT VFR BLD AUTO: 35 % (ref 40–53)
HGB BLD-MCNC: 11.4 G/DL (ref 13.3–17.7)
IMM GRANULOCYTES # BLD: <0.03 10E3/UL
IMM GRANULOCYTES NFR BLD: 0.2 %
LYMPHOCYTES # BLD AUTO: 0.73 10E3/UL (ref 0.8–5.3)
LYMPHOCYTES NFR BLD AUTO: 12.1 %
MCH RBC QN AUTO: 31.2 PG (ref 26.5–33)
MCHC RBC AUTO-ENTMCNC: 32.6 G/DL (ref 31.5–36.5)
MCV RBC AUTO: 95.9 FL (ref 78–100)
MONOCYTES # BLD AUTO: 0.47 10E3/UL (ref 0–1.3)
MONOCYTES NFR BLD AUTO: 7.8 %
NEUTROPHILS # BLD AUTO: 4.8 10E3/UL (ref 1.6–8.3)
NEUTROPHILS NFR BLD AUTO: 79.4 %
NRBC # BLD AUTO: <0.03 10E3/UL
NRBC BLD AUTO-RTO: 0 /100
PLATELET # BLD AUTO: 109 10E3/UL (ref 150–450)
POTASSIUM SERPL-SCNC: 3.9 MMOL/L (ref 3.4–5.3)
RBC # BLD AUTO: 3.65 10E6/UL (ref 4.4–5.9)
SODIUM SERPL-SCNC: 137 MMOL/L (ref 135–145)
WBC # BLD AUTO: 6.04 10E3/UL (ref 4–11)

## 2025-08-25 PROCEDURE — 255N000002 HC RX 255 OP 636: Performed by: HOSPITALIST

## 2025-08-25 PROCEDURE — 97165 OT EVAL LOW COMPLEX 30 MIN: CPT | Mod: GO

## 2025-08-25 PROCEDURE — 80048 BASIC METABOLIC PNL TOTAL CA: CPT | Performed by: HOSPITALIST

## 2025-08-25 PROCEDURE — 120N000004 HC R&B MS OVERFLOW

## 2025-08-25 PROCEDURE — 92610 EVALUATE SWALLOWING FUNCTION: CPT | Mod: GN

## 2025-08-25 PROCEDURE — 97162 PT EVAL MOD COMPLEX 30 MIN: CPT | Mod: GP

## 2025-08-25 PROCEDURE — 99233 SBSQ HOSP IP/OBS HIGH 50: CPT | Performed by: INTERNAL MEDICINE

## 2025-08-25 PROCEDURE — 97530 THERAPEUTIC ACTIVITIES: CPT | Mod: GP

## 2025-08-25 PROCEDURE — 97535 SELF CARE MNGMENT TRAINING: CPT | Mod: GO

## 2025-08-25 PROCEDURE — 250N000011 HC RX IP 250 OP 636: Performed by: HOSPITALIST

## 2025-08-25 PROCEDURE — 36415 COLL VENOUS BLD VENIPUNCTURE: CPT | Performed by: HOSPITALIST

## 2025-08-25 PROCEDURE — G0463 HOSPITAL OUTPT CLINIC VISIT: HCPCS

## 2025-08-25 PROCEDURE — 250N000013 HC RX MED GY IP 250 OP 250 PS 637: Performed by: HOSPITALIST

## 2025-08-25 PROCEDURE — 258N000003 HC RX IP 258 OP 636: Performed by: INTERNAL MEDICINE

## 2025-08-25 PROCEDURE — 99221 1ST HOSP IP/OBS SF/LOW 40: CPT | Performed by: INTERNAL MEDICINE

## 2025-08-25 PROCEDURE — 85004 AUTOMATED DIFF WBC COUNT: CPT | Performed by: HOSPITALIST

## 2025-08-25 RX ORDER — SODIUM CHLORIDE 9 MG/ML
INJECTION, SOLUTION INTRAVENOUS CONTINUOUS
Status: ACTIVE | OUTPATIENT
Start: 2025-08-25 | End: 2025-08-25

## 2025-08-25 RX ADMIN — PIPERACILLIN AND TAZOBACTAM 3.38 G: 3; .375 INJECTION, POWDER, FOR SOLUTION INTRAVENOUS at 05:43

## 2025-08-25 RX ADMIN — TAMSULOSIN HYDROCHLORIDE 0.4 MG: 0.4 CAPSULE ORAL at 08:03

## 2025-08-25 RX ADMIN — PIPERACILLIN AND TAZOBACTAM 3.38 G: 3; .375 INJECTION, POWDER, FOR SOLUTION INTRAVENOUS at 00:43

## 2025-08-25 RX ADMIN — CARBIDOPA AND LEVODOPA 2 TABLET: 25; 100 TABLET ORAL at 20:52

## 2025-08-25 RX ADMIN — GUAIFENESIN 1200 MG: 600 TABLET ORAL at 20:52

## 2025-08-25 RX ADMIN — SODIUM CHLORIDE: 0.9 INJECTION, SOLUTION INTRAVENOUS at 13:34

## 2025-08-25 RX ADMIN — FINASTERIDE 5 MG: 5 TABLET, FILM COATED ORAL at 08:09

## 2025-08-25 RX ADMIN — CARBIDOPA AND LEVODOPA 2 TABLET: 25; 100 TABLET ORAL at 08:03

## 2025-08-25 RX ADMIN — PIPERACILLIN AND TAZOBACTAM 3.38 G: 3; .375 INJECTION, POWDER, FOR SOLUTION INTRAVENOUS at 18:46

## 2025-08-25 RX ADMIN — MICONAZOLE NITRATE ANTIFUNGAL POWDER 1 APPLICATOR: 2 POWDER TOPICAL at 08:11

## 2025-08-25 RX ADMIN — PERFLUTREN 2 ML (DILUTED): 6.52 INJECTION, SUSPENSION INTRAVENOUS at 07:30

## 2025-08-25 RX ADMIN — CARBIDOPA AND LEVODOPA 2 TABLET: 25; 100 TABLET ORAL at 13:34

## 2025-08-25 RX ADMIN — DONEPEZIL HYDROCHLORIDE 5 MG: 5 TABLET, FILM COATED ORAL at 08:09

## 2025-08-25 RX ADMIN — PIPERACILLIN AND TAZOBACTAM 3.38 G: 3; .375 INJECTION, POWDER, FOR SOLUTION INTRAVENOUS at 11:37

## 2025-08-25 RX ADMIN — GUAIFENESIN 1200 MG: 600 TABLET ORAL at 08:03

## 2025-08-25 RX ADMIN — MICONAZOLE NITRATE ANTIFUNGAL POWDER: 2 POWDER TOPICAL at 20:52

## 2025-08-25 RX ADMIN — CITALOPRAM HYDROBROMIDE 10 MG: 10 TABLET ORAL at 08:09

## 2025-08-25 ASSESSMENT — ACTIVITIES OF DAILY LIVING (ADL)
ADLS_ACUITY_SCORE: 64
ADLS_ACUITY_SCORE: 63
ADLS_ACUITY_SCORE: 64
ADLS_ACUITY_SCORE: 64
ADLS_ACUITY_SCORE: 63
ADLS_ACUITY_SCORE: 64
ADLS_ACUITY_SCORE: 70
ADLS_ACUITY_SCORE: 70
ADLS_ACUITY_SCORE: 64
ADLS_ACUITY_SCORE: 64
ADLS_ACUITY_SCORE: 63
ADLS_ACUITY_SCORE: 64
ADLS_ACUITY_SCORE: 63
ADLS_ACUITY_SCORE: 64

## 2025-08-26 ENCOUNTER — APPOINTMENT (OUTPATIENT)
Dept: PHYSICAL THERAPY | Facility: HOSPITAL | Age: 85
End: 2025-08-26
Attending: HOSPITALIST
Payer: COMMERCIAL

## 2025-08-26 LAB
ALBUMIN SERPL BCG-MCNC: 3.4 G/DL (ref 3.5–5.2)
ALP SERPL-CCNC: 57 U/L (ref 40–150)
ALT SERPL W P-5'-P-CCNC: 6 U/L (ref 0–70)
ANION GAP SERPL CALCULATED.3IONS-SCNC: 10 MMOL/L (ref 7–15)
AST SERPL W P-5'-P-CCNC: 36 U/L (ref 0–45)
ATRIAL RATE - MUSE: 300 BPM
BILIRUB SERPL-MCNC: 0.6 MG/DL
BUN SERPL-MCNC: 15.1 MG/DL (ref 8–23)
CALCIUM SERPL-MCNC: 8.2 MG/DL (ref 8.8–10.4)
CHLORIDE SERPL-SCNC: 103 MMOL/L (ref 98–107)
CREAT SERPL-MCNC: 1.05 MG/DL (ref 0.67–1.17)
DIASTOLIC BLOOD PRESSURE - MUSE: NORMAL MMHG
EGFRCR SERPLBLD CKD-EPI 2021: 70 ML/MIN/1.73M2
ERYTHROCYTE [DISTWIDTH] IN BLOOD BY AUTOMATED COUNT: 12.4 % (ref 10–15)
GLUCOSE BLDC GLUCOMTR-MCNC: 105 MG/DL (ref 70–99)
GLUCOSE SERPL-MCNC: 117 MG/DL (ref 70–99)
HCO3 SERPL-SCNC: 22 MMOL/L (ref 22–29)
HCT VFR BLD AUTO: 38 % (ref 40–53)
HGB BLD-MCNC: 12.7 G/DL (ref 13.3–17.7)
INTERPRETATION ECG - MUSE: NORMAL
MAGNESIUM SERPL-MCNC: 1.7 MG/DL (ref 1.7–2.3)
MCH RBC QN AUTO: 31.4 PG (ref 26.5–33)
MCHC RBC AUTO-ENTMCNC: 33.4 G/DL (ref 31.5–36.5)
MCV RBC AUTO: 94.1 FL (ref 78–100)
P AXIS - MUSE: NORMAL DEGREES
PHOSPHATE SERPL-MCNC: 2.3 MG/DL (ref 2.5–4.5)
PLATELET # BLD AUTO: 112 10E3/UL (ref 150–450)
POTASSIUM SERPL-SCNC: 4.1 MMOL/L (ref 3.4–5.3)
PR INTERVAL - MUSE: NORMAL MS
PROT SERPL-MCNC: 6.4 G/DL (ref 6.4–8.3)
QRS DURATION - MUSE: 114 MS
QT - MUSE: 298 MS
QTC - MUSE: 508 MS
R AXIS - MUSE: -81 DEGREES
RBC # BLD AUTO: 4.04 10E6/UL (ref 4.4–5.9)
SODIUM SERPL-SCNC: 135 MMOL/L (ref 135–145)
SYSTOLIC BLOOD PRESSURE - MUSE: NORMAL MMHG
T AXIS - MUSE: 94 DEGREES
VENTRICULAR RATE- MUSE: 175 BPM
WBC # BLD AUTO: 10.24 10E3/UL (ref 4–11)

## 2025-08-26 PROCEDURE — 84100 ASSAY OF PHOSPHORUS: CPT

## 2025-08-26 PROCEDURE — 82040 ASSAY OF SERUM ALBUMIN: CPT

## 2025-08-26 PROCEDURE — 250N000013 HC RX MED GY IP 250 OP 250 PS 637: Performed by: HOSPITALIST

## 2025-08-26 PROCEDURE — 250N000011 HC RX IP 250 OP 636: Performed by: HOSPITALIST

## 2025-08-26 PROCEDURE — 87040 BLOOD CULTURE FOR BACTERIA: CPT

## 2025-08-26 PROCEDURE — 97530 THERAPEUTIC ACTIVITIES: CPT | Mod: GP

## 2025-08-26 PROCEDURE — 250N000013 HC RX MED GY IP 250 OP 250 PS 637: Performed by: INTERNAL MEDICINE

## 2025-08-26 PROCEDURE — 250N000011 HC RX IP 250 OP 636: Performed by: INTERNAL MEDICINE

## 2025-08-26 PROCEDURE — 93005 ELECTROCARDIOGRAM TRACING: CPT

## 2025-08-26 PROCEDURE — 83735 ASSAY OF MAGNESIUM: CPT

## 2025-08-26 PROCEDURE — 999N000157 HC STATISTIC RCP TIME EA 10 MIN

## 2025-08-26 PROCEDURE — 120N000004 HC R&B MS OVERFLOW

## 2025-08-26 PROCEDURE — 250N000011 HC RX IP 250 OP 636

## 2025-08-26 PROCEDURE — 99232 SBSQ HOSP IP/OBS MODERATE 35: CPT | Performed by: INTERNAL MEDICINE

## 2025-08-26 PROCEDURE — 36415 COLL VENOUS BLD VENIPUNCTURE: CPT

## 2025-08-26 PROCEDURE — 85027 COMPLETE CBC AUTOMATED: CPT

## 2025-08-26 PROCEDURE — 258N000003 HC RX IP 258 OP 636

## 2025-08-26 RX ORDER — CEFAZOLIN SODIUM 1 G/50ML
1250 SOLUTION INTRAVENOUS EVERY 24 HOURS
Status: DISCONTINUED | OUTPATIENT
Start: 2025-08-27 | End: 2025-08-27 | Stop reason: HOSPADM

## 2025-08-26 RX ORDER — LORAZEPAM 2 MG/ML
INJECTION INTRAMUSCULAR
Status: COMPLETED
Start: 2025-08-26 | End: 2025-08-26

## 2025-08-26 RX ORDER — LORAZEPAM 2 MG/ML
1 INJECTION INTRAMUSCULAR ONCE
Status: COMPLETED | OUTPATIENT
Start: 2025-08-26 | End: 2025-08-26

## 2025-08-26 RX ADMIN — GUAIFENESIN 1200 MG: 600 TABLET ORAL at 08:47

## 2025-08-26 RX ADMIN — GUAIFENESIN 1200 MG: 600 TABLET ORAL at 20:50

## 2025-08-26 RX ADMIN — MICONAZOLE NITRATE ANTIFUNGAL POWDER: 2 POWDER TOPICAL at 20:51

## 2025-08-26 RX ADMIN — TAMSULOSIN HYDROCHLORIDE 0.4 MG: 0.4 CAPSULE ORAL at 08:47

## 2025-08-26 RX ADMIN — LORAZEPAM 1 MG: 2 INJECTION INTRAMUSCULAR; INTRAVENOUS at 02:44

## 2025-08-26 RX ADMIN — CITALOPRAM HYDROBROMIDE 10 MG: 10 TABLET ORAL at 08:48

## 2025-08-26 RX ADMIN — METOPROLOL TARTRATE 12.5 MG: 25 TABLET, FILM COATED ORAL at 15:27

## 2025-08-26 RX ADMIN — CARBIDOPA AND LEVODOPA 2 TABLET: 25; 100 TABLET ORAL at 15:27

## 2025-08-26 RX ADMIN — MICONAZOLE NITRATE ANTIFUNGAL POWDER: 2 POWDER TOPICAL at 08:48

## 2025-08-26 RX ADMIN — PIPERACILLIN AND TAZOBACTAM 3.38 G: 3; .375 INJECTION, POWDER, FOR SOLUTION INTRAVENOUS at 06:17

## 2025-08-26 RX ADMIN — PIPERACILLIN AND TAZOBACTAM 3.38 G: 3; .375 INJECTION, POWDER, FOR SOLUTION INTRAVENOUS at 12:22

## 2025-08-26 RX ADMIN — METOPROLOL TARTRATE 12.5 MG: 25 TABLET, FILM COATED ORAL at 20:50

## 2025-08-26 RX ADMIN — FINASTERIDE 5 MG: 5 TABLET, FILM COATED ORAL at 08:48

## 2025-08-26 RX ADMIN — SODIUM CHLORIDE 1500 MG: 0.9 INJECTION, SOLUTION INTRAVENOUS at 04:10

## 2025-08-26 RX ADMIN — LORAZEPAM 1 MG: 2 INJECTION INTRAMUSCULAR at 02:44

## 2025-08-26 RX ADMIN — ACETAMINOPHEN 650 MG: 325 TABLET ORAL at 02:47

## 2025-08-26 RX ADMIN — CARBIDOPA AND LEVODOPA 2 TABLET: 25; 100 TABLET ORAL at 06:17

## 2025-08-26 RX ADMIN — PIPERACILLIN AND TAZOBACTAM 3.38 G: 3; .375 INJECTION, POWDER, FOR SOLUTION INTRAVENOUS at 17:00

## 2025-08-26 RX ADMIN — METOPROLOL TARTRATE 12.5 MG: 25 TABLET, FILM COATED ORAL at 08:53

## 2025-08-26 RX ADMIN — DONEPEZIL HYDROCHLORIDE 5 MG: 5 TABLET, FILM COATED ORAL at 08:48

## 2025-08-26 RX ADMIN — CARBIDOPA AND LEVODOPA 2 TABLET: 25; 100 TABLET ORAL at 20:50

## 2025-08-26 RX ADMIN — PIPERACILLIN AND TAZOBACTAM 3.38 G: 3; .375 INJECTION, POWDER, FOR SOLUTION INTRAVENOUS at 00:21

## 2025-08-26 ASSESSMENT — ACTIVITIES OF DAILY LIVING (ADL)
ADLS_ACUITY_SCORE: 68
ADLS_ACUITY_SCORE: 70
ADLS_ACUITY_SCORE: 70
ADLS_ACUITY_SCORE: 68
ADLS_ACUITY_SCORE: 70
ADLS_ACUITY_SCORE: 72
ADLS_ACUITY_SCORE: 70
ADLS_ACUITY_SCORE: 70
ADLS_ACUITY_SCORE: 68
ADLS_ACUITY_SCORE: 68
ADLS_ACUITY_SCORE: 70
ADLS_ACUITY_SCORE: 68
ADLS_ACUITY_SCORE: 72
ADLS_ACUITY_SCORE: 70
ADLS_ACUITY_SCORE: 68
ADLS_ACUITY_SCORE: 70
ADLS_ACUITY_SCORE: 72
ADLS_ACUITY_SCORE: 70
ADLS_ACUITY_SCORE: 68
ADLS_ACUITY_SCORE: 70
ADLS_ACUITY_SCORE: 70

## 2025-08-27 VITALS
HEART RATE: 64 BPM | SYSTOLIC BLOOD PRESSURE: 152 MMHG | DIASTOLIC BLOOD PRESSURE: 69 MMHG | TEMPERATURE: 97.8 F | OXYGEN SATURATION: 93 % | RESPIRATION RATE: 22 BRPM | HEIGHT: 64 IN | WEIGHT: 172.84 LBS | BODY MASS INDEX: 29.51 KG/M2

## 2025-08-27 PROBLEM — A41.9 SEPSIS (H): Status: RESOLVED | Noted: 2025-08-24 | Resolved: 2025-08-27

## 2025-08-27 LAB
CREAT SERPL-MCNC: 1.03 MG/DL (ref 0.67–1.17)
EGFRCR SERPLBLD CKD-EPI 2021: 71 ML/MIN/1.73M2
HOLD SPECIMEN: NORMAL

## 2025-08-27 PROCEDURE — 250N000013 HC RX MED GY IP 250 OP 250 PS 637: Performed by: INTERNAL MEDICINE

## 2025-08-27 PROCEDURE — 250N000011 HC RX IP 250 OP 636: Performed by: HOSPITALIST

## 2025-08-27 PROCEDURE — 250N000013 HC RX MED GY IP 250 OP 250 PS 637

## 2025-08-27 PROCEDURE — 82565 ASSAY OF CREATININE: CPT

## 2025-08-27 PROCEDURE — 250N000011 HC RX IP 250 OP 636

## 2025-08-27 PROCEDURE — 250N000013 HC RX MED GY IP 250 OP 250 PS 637: Performed by: HOSPITALIST

## 2025-08-27 PROCEDURE — 258N000003 HC RX IP 258 OP 636

## 2025-08-27 PROCEDURE — 36415 COLL VENOUS BLD VENIPUNCTURE: CPT

## 2025-08-27 RX ORDER — ATROPINE SULFATE 10 MG/ML
1 SOLUTION/ DROPS OPHTHALMIC EVERY 4 HOURS PRN
Qty: 5 ML | Refills: 0 | Status: SHIPPED | OUTPATIENT
Start: 2025-08-27

## 2025-08-27 RX ORDER — LORAZEPAM 0.5 MG/1
0.5 TABLET ORAL
Status: COMPLETED | OUTPATIENT
Start: 2025-08-27 | End: 2025-08-27

## 2025-08-27 RX ORDER — HALOPERIDOL 2 MG/ML
0.5 SOLUTION ORAL EVERY 6 HOURS PRN
Qty: 10 ML | Refills: 0 | Status: SHIPPED | OUTPATIENT
Start: 2025-08-27

## 2025-08-27 RX ORDER — METOPROLOL TARTRATE 25 MG/1
12.5 TABLET, FILM COATED ORAL 3 TIMES DAILY
Qty: 45 TABLET | Refills: 0 | Status: SHIPPED | OUTPATIENT
Start: 2025-08-27 | End: 2025-09-26

## 2025-08-27 RX ORDER — BISACODYL 10 MG
10 SUPPOSITORY, RECTAL RECTAL DAILY PRN
Qty: 2 SUPPOSITORY | Refills: 0 | Status: SHIPPED | OUTPATIENT
Start: 2025-08-27

## 2025-08-27 RX ORDER — ACETAMINOPHEN 650 MG/1
650 SUPPOSITORY RECTAL EVERY 4 HOURS PRN
Qty: 4 SUPPOSITORY | Refills: 0 | Status: SHIPPED | OUTPATIENT
Start: 2025-08-27

## 2025-08-27 RX ORDER — SENNOSIDES 8.6 MG/1
1 TABLET ORAL 2 TIMES DAILY PRN
Qty: 100 TABLET | Refills: 0 | Status: SHIPPED | OUTPATIENT
Start: 2025-08-27

## 2025-08-27 RX ORDER — LORAZEPAM 2 MG/ML
0.25 CONCENTRATE ORAL EVERY 4 HOURS PRN
Qty: 30 ML | Refills: 0 | Status: SHIPPED | OUTPATIENT
Start: 2025-08-27

## 2025-08-27 RX ORDER — MORPHINE SULFATE 100 MG/5ML
2.5 SOLUTION ORAL
Qty: 30 ML | Refills: 0 | Status: SHIPPED | OUTPATIENT
Start: 2025-08-27

## 2025-08-27 RX ADMIN — TAMSULOSIN HYDROCHLORIDE 0.4 MG: 0.4 CAPSULE ORAL at 08:20

## 2025-08-27 RX ADMIN — CITALOPRAM HYDROBROMIDE 10 MG: 10 TABLET ORAL at 08:20

## 2025-08-27 RX ADMIN — CARBIDOPA AND LEVODOPA 2 TABLET: 25; 100 TABLET ORAL at 08:18

## 2025-08-27 RX ADMIN — METOPROLOL TARTRATE 12.5 MG: 25 TABLET, FILM COATED ORAL at 08:18

## 2025-08-27 RX ADMIN — PIPERACILLIN AND TAZOBACTAM 3.38 G: 3; .375 INJECTION, POWDER, FOR SOLUTION INTRAVENOUS at 00:09

## 2025-08-27 RX ADMIN — GUAIFENESIN 1200 MG: 600 TABLET ORAL at 08:20

## 2025-08-27 RX ADMIN — DONEPEZIL HYDROCHLORIDE 5 MG: 5 TABLET, FILM COATED ORAL at 08:19

## 2025-08-27 RX ADMIN — PIPERACILLIN AND TAZOBACTAM 3.38 G: 3; .375 INJECTION, POWDER, FOR SOLUTION INTRAVENOUS at 05:51

## 2025-08-27 RX ADMIN — MICONAZOLE NITRATE ANTIFUNGAL POWDER: 2 POWDER TOPICAL at 08:20

## 2025-08-27 RX ADMIN — LORAZEPAM 0.5 MG: 0.5 TABLET ORAL at 04:01

## 2025-08-27 RX ADMIN — SODIUM CHLORIDE 1250 MG: 0.9 INJECTION, SOLUTION INTRAVENOUS at 05:51

## 2025-08-27 RX ADMIN — FINASTERIDE 5 MG: 5 TABLET, FILM COATED ORAL at 08:19

## 2025-08-27 ASSESSMENT — ACTIVITIES OF DAILY LIVING (ADL)
ADLS_ACUITY_SCORE: 70
ADLS_ACUITY_SCORE: 72

## 2025-08-28 ENCOUNTER — PATIENT OUTREACH (OUTPATIENT)
Dept: CARE COORDINATION | Facility: CLINIC | Age: 85
End: 2025-08-28
Payer: COMMERCIAL

## 2025-08-28 LAB
BACTERIA SPEC CULT: NORMAL

## 2025-08-31 LAB
BACTERIA SPEC CULT: NO GROWTH
BACTERIA SPEC CULT: NO GROWTH